# Patient Record
Sex: FEMALE | Race: WHITE | NOT HISPANIC OR LATINO | Employment: OTHER | URBAN - NONMETROPOLITAN AREA
[De-identification: names, ages, dates, MRNs, and addresses within clinical notes are randomized per-mention and may not be internally consistent; named-entity substitution may affect disease eponyms.]

---

## 2024-02-27 ENCOUNTER — LAB REQUISITION (OUTPATIENT)
Dept: LAB | Facility: HOSPITAL | Age: 84
End: 2024-02-27
Payer: MEDICARE

## 2024-02-27 DIAGNOSIS — R50.9 FEVER, UNSPECIFIED: ICD-10-CM

## 2024-02-27 LAB
ALBUMIN SERPL BCP-MCNC: 3.5 G/DL (ref 3.4–5)
ALP SERPL-CCNC: 84 U/L (ref 33–136)
ALT SERPL W P-5'-P-CCNC: 20 U/L (ref 7–45)
ANION GAP SERPL CALC-SCNC: 10 MMOL/L (ref 10–20)
AST SERPL W P-5'-P-CCNC: 38 U/L (ref 9–39)
BASOPHILS # BLD AUTO: 0.09 X10*3/UL (ref 0–0.1)
BASOPHILS NFR BLD AUTO: 0.3 %
BILIRUB SERPL-MCNC: 0.4 MG/DL (ref 0–1.2)
BUN SERPL-MCNC: 31 MG/DL (ref 6–23)
CALCIUM SERPL-MCNC: 8.9 MG/DL (ref 8.6–10.3)
CHLORIDE SERPL-SCNC: 100 MMOL/L (ref 98–107)
CO2 SERPL-SCNC: 36 MMOL/L (ref 21–32)
CREAT SERPL-MCNC: 1.12 MG/DL (ref 0.5–1.05)
EGFRCR SERPLBLD CKD-EPI 2021: 49 ML/MIN/1.73M*2
EOSINOPHIL # BLD AUTO: 0.01 X10*3/UL (ref 0–0.4)
EOSINOPHIL NFR BLD AUTO: 0 %
ERYTHROCYTE [DISTWIDTH] IN BLOOD BY AUTOMATED COUNT: 13.4 % (ref 11.5–14.5)
GLUCOSE SERPL-MCNC: 94 MG/DL (ref 74–99)
HCT VFR BLD AUTO: 35.8 % (ref 36–46)
HGB BLD-MCNC: 10.8 G/DL (ref 12–16)
IMM GRANULOCYTES # BLD AUTO: 0.36 X10*3/UL (ref 0–0.5)
IMM GRANULOCYTES NFR BLD AUTO: 1.1 % (ref 0–0.9)
LYMPHOCYTES # BLD AUTO: 1 X10*3/UL (ref 0.8–3)
LYMPHOCYTES NFR BLD AUTO: 3.1 %
MCH RBC QN AUTO: 28.6 PG (ref 26–34)
MCHC RBC AUTO-ENTMCNC: 30.2 G/DL (ref 32–36)
MCV RBC AUTO: 95 FL (ref 80–100)
MONOCYTES # BLD AUTO: 2.6 X10*3/UL (ref 0.05–0.8)
MONOCYTES NFR BLD AUTO: 8.1 %
NEUTROPHILS # BLD AUTO: 28.11 X10*3/UL (ref 1.6–5.5)
NEUTROPHILS NFR BLD AUTO: 87.4 %
NRBC BLD-RTO: 0 /100 WBCS (ref 0–0)
PLATELET # BLD AUTO: 229 X10*3/UL (ref 150–450)
POTASSIUM SERPL-SCNC: 3.4 MMOL/L (ref 3.5–5.3)
PROT SERPL-MCNC: 6.1 G/DL (ref 6.4–8.2)
RBC # BLD AUTO: 3.78 X10*6/UL (ref 4–5.2)
SODIUM SERPL-SCNC: 143 MMOL/L (ref 136–145)
WBC # BLD AUTO: 32.2 X10*3/UL (ref 4.4–11.3)

## 2024-02-27 PROCEDURE — 80053 COMPREHEN METABOLIC PANEL: CPT

## 2024-02-27 PROCEDURE — 85025 COMPLETE CBC W/AUTO DIFF WBC: CPT

## 2024-05-25 ENCOUNTER — APPOINTMENT (OUTPATIENT)
Dept: RADIOLOGY | Facility: HOSPITAL | Age: 84
DRG: 871 | End: 2024-05-25
Payer: MEDICARE

## 2024-05-25 ENCOUNTER — HOSPITAL ENCOUNTER (INPATIENT)
Facility: HOSPITAL | Age: 84
LOS: 10 days | Discharge: NURSING, REHAB OR LONG-TERM CARE | DRG: 871 | End: 2024-06-04
Attending: STUDENT IN AN ORGANIZED HEALTH CARE EDUCATION/TRAINING PROGRAM | Admitting: FAMILY MEDICINE
Payer: MEDICARE

## 2024-05-25 ENCOUNTER — APPOINTMENT (OUTPATIENT)
Dept: CARDIOLOGY | Facility: HOSPITAL | Age: 84
DRG: 871 | End: 2024-05-25
Payer: MEDICARE

## 2024-05-25 DIAGNOSIS — I63.9 ISCHEMIC STROKE (MULTI): ICD-10-CM

## 2024-05-25 DIAGNOSIS — R40.0 SOMNOLENCE: Primary | ICD-10-CM

## 2024-05-25 DIAGNOSIS — J96.02 ACUTE RESPIRATORY FAILURE WITH HYPOXIA AND HYPERCARBIA (MULTI): ICD-10-CM

## 2024-05-25 DIAGNOSIS — I63.89 AC ISCH MULTI VASC TERRITORIES STROKE (MULTI): ICD-10-CM

## 2024-05-25 DIAGNOSIS — R41.82 ALTERED MENTAL STATUS, UNSPECIFIED ALTERED MENTAL STATUS TYPE: ICD-10-CM

## 2024-05-25 DIAGNOSIS — K59.00 CONSTIPATION, UNSPECIFIED CONSTIPATION TYPE: ICD-10-CM

## 2024-05-25 DIAGNOSIS — N39.0 ACUTE UTI: ICD-10-CM

## 2024-05-25 DIAGNOSIS — J96.01 ACUTE RESPIRATORY FAILURE WITH HYPOXIA AND HYPERCARBIA (MULTI): ICD-10-CM

## 2024-05-25 LAB
ALBUMIN SERPL-MCNC: 3.7 G/DL (ref 3.5–5)
ALP BLD-CCNC: 97 U/L (ref 35–125)
ALT SERPL-CCNC: 9 U/L (ref 5–40)
ANION GAP BLDV CALCULATED.4IONS-SCNC: 1 MMOL/L (ref 10–25)
ANION GAP SERPL CALC-SCNC: 7 MMOL/L
APPEARANCE UR: ABNORMAL
AST SERPL-CCNC: 16 U/L (ref 5–40)
BACTERIA #/AREA URNS AUTO: ABNORMAL /HPF
BASE EXCESS BLDV CALC-SCNC: 11.6 MMOL/L (ref -2–3)
BASOPHILS # BLD AUTO: 0.05 X10*3/UL (ref 0–0.1)
BASOPHILS NFR BLD AUTO: 0.5 %
BILIRUB SERPL-MCNC: 0.3 MG/DL (ref 0.1–1.2)
BILIRUB UR STRIP.AUTO-MCNC: NEGATIVE MG/DL
BODY TEMPERATURE: 37 DEGREES CELSIUS
BUN SERPL-MCNC: 42 MG/DL (ref 8–25)
CA-I BLDV-SCNC: 1.17 MMOL/L (ref 1.1–1.33)
CALCIUM SERPL-MCNC: 9.2 MG/DL (ref 8.5–10.4)
CHLORIDE BLDV-SCNC: 102 MMOL/L (ref 98–107)
CHLORIDE SERPL-SCNC: 98 MMOL/L (ref 97–107)
CO2 SERPL-SCNC: 36 MMOL/L (ref 24–31)
COLOR UR: YELLOW
CREAT SERPL-MCNC: 1.9 MG/DL (ref 0.4–1.6)
EGFRCR SERPLBLD CKD-EPI 2021: 26 ML/MIN/1.73M*2
EOSINOPHIL # BLD AUTO: 0.17 X10*3/UL (ref 0–0.4)
EOSINOPHIL NFR BLD AUTO: 1.8 %
ERYTHROCYTE [DISTWIDTH] IN BLOOD BY AUTOMATED COUNT: 13.8 % (ref 11.5–14.5)
GLUCOSE BLDV-MCNC: 126 MG/DL (ref 74–99)
GLUCOSE SERPL-MCNC: 128 MG/DL (ref 65–99)
GLUCOSE UR STRIP.AUTO-MCNC: NORMAL MG/DL
GRAN CASTS #/AREA UR COMP ASSIST: ABNORMAL /LPF
HCO3 BLDV-SCNC: 40.8 MMOL/L (ref 22–26)
HCT VFR BLD AUTO: 36.9 % (ref 36–46)
HCT VFR BLD EST: 32 % (ref 36–46)
HGB BLD-MCNC: 10.2 G/DL (ref 12–16)
HGB BLDV-MCNC: 10.7 G/DL (ref 12–16)
HYALINE CASTS #/AREA URNS AUTO: ABNORMAL /LPF
HYPOCHROMIA BLD QL SMEAR: NORMAL
IMM GRANULOCYTES # BLD AUTO: 0.12 X10*3/UL (ref 0–0.5)
IMM GRANULOCYTES NFR BLD AUTO: 1.3 % (ref 0–0.9)
INHALED O2 CONCENTRATION: 32 %
KETONES UR STRIP.AUTO-MCNC: NEGATIVE MG/DL
LACTATE BLDV-SCNC: 0.7 MMOL/L (ref 0.4–2)
LEUKOCYTE ESTERASE UR QL STRIP.AUTO: ABNORMAL
LYMPHOCYTES # BLD AUTO: 1.49 X10*3/UL (ref 0.8–3)
LYMPHOCYTES NFR BLD AUTO: 15.8 %
MCH RBC QN AUTO: 26.6 PG (ref 26–34)
MCHC RBC AUTO-ENTMCNC: 27.6 G/DL (ref 32–36)
MCV RBC AUTO: 96 FL (ref 80–100)
MONOCYTES # BLD AUTO: 0.98 X10*3/UL (ref 0.05–0.8)
MONOCYTES NFR BLD AUTO: 10.4 %
MUCOUS THREADS #/AREA URNS AUTO: ABNORMAL /LPF
NEUTROPHILS # BLD AUTO: 6.61 X10*3/UL (ref 1.6–5.5)
NEUTROPHILS NFR BLD AUTO: 70.2 %
NITRITE UR QL STRIP.AUTO: NEGATIVE
NRBC BLD-RTO: 0 /100 WBCS (ref 0–0)
NT-PROBNP SERPL-MCNC: 7983 PG/ML (ref 0–624)
OXYHGB MFR BLDV: 74.3 % (ref 45–75)
PCO2 BLDV: 83 MM HG (ref 41–51)
PH BLDV: 7.3 PH (ref 7.33–7.43)
PH UR STRIP.AUTO: 5.5 [PH]
PLATELET # BLD AUTO: 290 X10*3/UL (ref 150–450)
PO2 BLDV: 47 MM HG (ref 35–45)
POTASSIUM BLDV-SCNC: 5.8 MMOL/L (ref 3.5–5.3)
POTASSIUM SERPL-SCNC: 5 MMOL/L (ref 3.4–5.1)
PROT SERPL-MCNC: 7.8 G/DL (ref 5.9–7.9)
PROT UR STRIP.AUTO-MCNC: ABNORMAL MG/DL
RBC # BLD AUTO: 3.84 X10*6/UL (ref 4–5.2)
RBC # UR STRIP.AUTO: NEGATIVE /UL
RBC #/AREA URNS AUTO: ABNORMAL /HPF
RBC MORPH BLD: NORMAL
SAO2 % BLDV: 75 % (ref 45–75)
SARS-COV-2 RNA RESP QL NAA+PROBE: NOT DETECTED
SODIUM BLDV-SCNC: 138 MMOL/L (ref 136–145)
SODIUM SERPL-SCNC: 141 MMOL/L (ref 133–145)
SP GR UR STRIP.AUTO: 1.02
SQUAMOUS #/AREA URNS AUTO: ABNORMAL /HPF
TRANS CELLS #/AREA UR COMP ASSIST: ABNORMAL /HPF
TROPONIN T SERPL-MCNC: 24 NG/L
UROBILINOGEN UR STRIP.AUTO-MCNC: NORMAL MG/DL
WBC # BLD AUTO: 9.4 X10*3/UL (ref 4.4–11.3)
WBC #/AREA URNS AUTO: >50 /HPF
WBC CLUMPS #/AREA URNS AUTO: ABNORMAL /HPF

## 2024-05-25 PROCEDURE — 2020000001 HC ICU ROOM DAILY

## 2024-05-25 PROCEDURE — 87086 URINE CULTURE/COLONY COUNT: CPT | Mod: TRILAB,WESLAB | Performed by: STUDENT IN AN ORGANIZED HEALTH CARE EDUCATION/TRAINING PROGRAM

## 2024-05-25 PROCEDURE — 36415 COLL VENOUS BLD VENIPUNCTURE: CPT | Performed by: STUDENT IN AN ORGANIZED HEALTH CARE EDUCATION/TRAINING PROGRAM

## 2024-05-25 PROCEDURE — 80053 COMPREHEN METABOLIC PANEL: CPT | Performed by: STUDENT IN AN ORGANIZED HEALTH CARE EDUCATION/TRAINING PROGRAM

## 2024-05-25 PROCEDURE — 83880 ASSAY OF NATRIURETIC PEPTIDE: CPT | Performed by: STUDENT IN AN ORGANIZED HEALTH CARE EDUCATION/TRAINING PROGRAM

## 2024-05-25 PROCEDURE — 2500000005 HC RX 250 GENERAL PHARMACY W/O HCPCS: Performed by: FAMILY MEDICINE

## 2024-05-25 PROCEDURE — 71045 X-RAY EXAM CHEST 1 VIEW: CPT

## 2024-05-25 PROCEDURE — 2500000004 HC RX 250 GENERAL PHARMACY W/ HCPCS (ALT 636 FOR OP/ED): Performed by: STUDENT IN AN ORGANIZED HEALTH CARE EDUCATION/TRAINING PROGRAM

## 2024-05-25 PROCEDURE — 84484 ASSAY OF TROPONIN QUANT: CPT | Performed by: STUDENT IN AN ORGANIZED HEALTH CARE EDUCATION/TRAINING PROGRAM

## 2024-05-25 PROCEDURE — 2500000001 HC RX 250 WO HCPCS SELF ADMINISTERED DRUGS (ALT 637 FOR MEDICARE OP): Performed by: FAMILY MEDICINE

## 2024-05-25 PROCEDURE — 84132 ASSAY OF SERUM POTASSIUM: CPT | Mod: 91 | Performed by: STUDENT IN AN ORGANIZED HEALTH CARE EDUCATION/TRAINING PROGRAM

## 2024-05-25 PROCEDURE — 94660 CPAP INITIATION&MGMT: CPT

## 2024-05-25 PROCEDURE — 93010 ELECTROCARDIOGRAM REPORT: CPT | Performed by: INTERNAL MEDICINE

## 2024-05-25 PROCEDURE — 85025 COMPLETE CBC W/AUTO DIFF WBC: CPT | Performed by: STUDENT IN AN ORGANIZED HEALTH CARE EDUCATION/TRAINING PROGRAM

## 2024-05-25 PROCEDURE — 93005 ELECTROCARDIOGRAM TRACING: CPT

## 2024-05-25 PROCEDURE — 70450 CT HEAD/BRAIN W/O DYE: CPT

## 2024-05-25 PROCEDURE — 5A09357 ASSISTANCE WITH RESPIRATORY VENTILATION, LESS THAN 24 CONSECUTIVE HOURS, CONTINUOUS POSITIVE AIRWAY PRESSURE: ICD-10-PCS | Performed by: FAMILY MEDICINE

## 2024-05-25 PROCEDURE — 2500000004 HC RX 250 GENERAL PHARMACY W/ HCPCS (ALT 636 FOR OP/ED): Performed by: FAMILY MEDICINE

## 2024-05-25 PROCEDURE — 81001 URINALYSIS AUTO W/SCOPE: CPT | Performed by: STUDENT IN AN ORGANIZED HEALTH CARE EDUCATION/TRAINING PROGRAM

## 2024-05-25 PROCEDURE — 70450 CT HEAD/BRAIN W/O DYE: CPT | Performed by: RADIOLOGY

## 2024-05-25 PROCEDURE — 99291 CRITICAL CARE FIRST HOUR: CPT | Performed by: STUDENT IN AN ORGANIZED HEALTH CARE EDUCATION/TRAINING PROGRAM

## 2024-05-25 PROCEDURE — 87635 SARS-COV-2 COVID-19 AMP PRB: CPT | Performed by: STUDENT IN AN ORGANIZED HEALTH CARE EDUCATION/TRAINING PROGRAM

## 2024-05-25 PROCEDURE — 93880 EXTRACRANIAL BILAT STUDY: CPT | Performed by: RADIOLOGY

## 2024-05-25 PROCEDURE — 96374 THER/PROPH/DIAG INJ IV PUSH: CPT

## 2024-05-25 PROCEDURE — 93880 EXTRACRANIAL BILAT STUDY: CPT

## 2024-05-25 PROCEDURE — 71045 X-RAY EXAM CHEST 1 VIEW: CPT | Performed by: RADIOLOGY

## 2024-05-25 RX ORDER — POLYETHYLENE GLYCOL 3350 17 G/17G
17 POWDER, FOR SOLUTION ORAL DAILY PRN
COMMUNITY

## 2024-05-25 RX ORDER — POLYMYXIN B SULFATE AND TRIMETHOPRIM 1; 10000 MG/ML; [USP'U]/ML
1 SOLUTION OPHTHALMIC EVERY 4 HOURS
COMMUNITY

## 2024-05-25 RX ORDER — NYSTATIN 100000 [USP'U]/G
1 POWDER TOPICAL 2 TIMES DAILY
Status: DISCONTINUED | OUTPATIENT
Start: 2024-05-25 | End: 2024-06-04 | Stop reason: HOSPADM

## 2024-05-25 RX ORDER — CARVEDILOL 6.25 MG/1
6.25 TABLET ORAL
Status: DISCONTINUED | OUTPATIENT
Start: 2024-05-25 | End: 2024-06-04 | Stop reason: HOSPADM

## 2024-05-25 RX ORDER — PRAVASTATIN SODIUM 40 MG/1
40 TABLET ORAL NIGHTLY
COMMUNITY
End: 2024-06-04 | Stop reason: HOSPADM

## 2024-05-25 RX ORDER — ERGOCALCIFEROL 1.25 MG/1
1.25 CAPSULE ORAL
COMMUNITY

## 2024-05-25 RX ORDER — FUROSEMIDE 10 MG/ML
80 INJECTION INTRAMUSCULAR; INTRAVENOUS ONCE
Status: COMPLETED | OUTPATIENT
Start: 2024-05-25 | End: 2024-05-25

## 2024-05-25 RX ORDER — ACETAMINOPHEN 325 MG/1
650 TABLET ORAL EVERY 8 HOURS PRN
Status: DISCONTINUED | OUTPATIENT
Start: 2024-05-25 | End: 2024-05-27

## 2024-05-25 RX ORDER — NAPROXEN SODIUM 220 MG/1
81 TABLET, FILM COATED ORAL DAILY
Status: DISCONTINUED | OUTPATIENT
Start: 2024-05-25 | End: 2024-05-28

## 2024-05-25 RX ORDER — AMLODIPINE BESYLATE 10 MG/1
10 TABLET ORAL DAILY
Status: DISCONTINUED | OUTPATIENT
Start: 2024-05-25 | End: 2024-06-04 | Stop reason: HOSPADM

## 2024-05-25 RX ORDER — IPRATROPIUM BROMIDE AND ALBUTEROL SULFATE 2.5; .5 MG/3ML; MG/3ML
3 SOLUTION RESPIRATORY (INHALATION) EVERY 4 HOURS PRN
COMMUNITY

## 2024-05-25 RX ORDER — CEFTRIAXONE 1 G/50ML
1 INJECTION, SOLUTION INTRAVENOUS ONCE
Status: COMPLETED | OUTPATIENT
Start: 2024-05-25 | End: 2024-05-25

## 2024-05-25 RX ORDER — ATORVASTATIN CALCIUM 40 MG/1
40 TABLET, FILM COATED ORAL NIGHTLY
Status: DISCONTINUED | OUTPATIENT
Start: 2024-05-25 | End: 2024-06-04 | Stop reason: HOSPADM

## 2024-05-25 RX ORDER — ACETAMINOPHEN, DIPHENHYDRAMINE HCL, PHENYLEPHRINE HCL 325; 25; 5 MG/1; MG/1; MG/1
5 TABLET ORAL NIGHTLY
COMMUNITY

## 2024-05-25 RX ORDER — LEVOTHYROXINE SODIUM 100 UG/1
100 TABLET ORAL DAILY
Status: DISCONTINUED | OUTPATIENT
Start: 2024-05-25 | End: 2024-06-04 | Stop reason: HOSPADM

## 2024-05-25 RX ORDER — ISOSORBIDE MONONITRATE 30 MG/1
30 TABLET, EXTENDED RELEASE ORAL DAILY
COMMUNITY

## 2024-05-25 RX ORDER — NYSTATIN 100000 [USP'U]/G
1 POWDER TOPICAL 2 TIMES DAILY
COMMUNITY

## 2024-05-25 RX ORDER — OMEPRAZOLE 20 MG/1
20 TABLET, DELAYED RELEASE ORAL
COMMUNITY

## 2024-05-25 RX ORDER — HYDRALAZINE HYDROCHLORIDE 25 MG/1
25 TABLET, FILM COATED ORAL 3 TIMES DAILY
COMMUNITY
End: 2024-06-04 | Stop reason: HOSPADM

## 2024-05-25 RX ORDER — IPRATROPIUM BROMIDE AND ALBUTEROL SULFATE 2.5; .5 MG/3ML; MG/3ML
3 SOLUTION RESPIRATORY (INHALATION) EVERY 4 HOURS PRN
Status: DISCONTINUED | OUTPATIENT
Start: 2024-05-25 | End: 2024-06-04 | Stop reason: HOSPADM

## 2024-05-25 RX ORDER — ISOSORBIDE MONONITRATE 30 MG/1
30 TABLET, EXTENDED RELEASE ORAL DAILY
Status: DISCONTINUED | OUTPATIENT
Start: 2024-05-25 | End: 2024-06-04 | Stop reason: HOSPADM

## 2024-05-25 RX ORDER — AMLODIPINE BESYLATE 10 MG/1
10 TABLET ORAL DAILY
COMMUNITY
End: 2024-06-04 | Stop reason: HOSPADM

## 2024-05-25 RX ORDER — HYDROXYZINE HYDROCHLORIDE 10 MG/1
10 TABLET, FILM COATED ORAL 3 TIMES DAILY
COMMUNITY

## 2024-05-25 RX ORDER — LEVOTHYROXINE SODIUM 100 UG/1
100 TABLET ORAL DAILY
COMMUNITY

## 2024-05-25 RX ORDER — DOCUSATE SODIUM 100 MG/1
100 CAPSULE, LIQUID FILLED ORAL DAILY
COMMUNITY

## 2024-05-25 RX ORDER — ONDANSETRON 4 MG/1
4 TABLET, FILM COATED ORAL EVERY 6 HOURS PRN
COMMUNITY

## 2024-05-25 RX ORDER — CARVEDILOL 6.25 MG/1
6.25 TABLET ORAL
COMMUNITY

## 2024-05-25 RX ORDER — POLYETHYLENE GLYCOL 3350 17 G/17G
17 POWDER, FOR SOLUTION ORAL DAILY PRN
Status: DISCONTINUED | OUTPATIENT
Start: 2024-05-25 | End: 2024-05-27

## 2024-05-25 RX ORDER — ACETAMINOPHEN 325 MG/1
650 TABLET ORAL EVERY 8 HOURS PRN
COMMUNITY

## 2024-05-25 RX ORDER — HYDRALAZINE HYDROCHLORIDE 25 MG/1
25 TABLET, FILM COATED ORAL 3 TIMES DAILY
Status: DISCONTINUED | OUTPATIENT
Start: 2024-05-25 | End: 2024-06-04 | Stop reason: HOSPADM

## 2024-05-25 RX ORDER — DOCUSATE SODIUM 100 MG/1
100 CAPSULE, LIQUID FILLED ORAL DAILY
Status: DISCONTINUED | OUTPATIENT
Start: 2024-05-25 | End: 2024-06-04 | Stop reason: HOSPADM

## 2024-05-25 RX ADMIN — CEFTRIAXONE SODIUM 1 G: 1 INJECTION, SOLUTION INTRAVENOUS at 18:26

## 2024-05-25 RX ADMIN — NYSTATIN 1 APPLICATION: 100000 POWDER TOPICAL at 22:37

## 2024-05-25 RX ADMIN — Medication 5 L/MIN: at 18:49

## 2024-05-25 RX ADMIN — FUROSEMIDE 80 MG: 10 INJECTION, SOLUTION INTRAMUSCULAR; INTRAVENOUS at 15:05

## 2024-05-25 RX ADMIN — Medication 60 PERCENT: at 16:24

## 2024-05-25 SDOH — ECONOMIC STABILITY: INCOME INSECURITY: IN THE LAST 12 MONTHS, WAS THERE A TIME WHEN YOU WERE NOT ABLE TO PAY THE MORTGAGE OR RENT ON TIME?: NO

## 2024-05-25 SDOH — ECONOMIC STABILITY: INCOME INSECURITY: HOW HARD IS IT FOR YOU TO PAY FOR THE VERY BASICS LIKE FOOD, HOUSING, MEDICAL CARE, AND HEATING?: NOT HARD AT ALL

## 2024-05-25 SDOH — ECONOMIC STABILITY: HOUSING INSECURITY
IN THE LAST 12 MONTHS, WAS THERE A TIME WHEN YOU DID NOT HAVE A STEADY PLACE TO SLEEP OR SLEPT IN A SHELTER (INCLUDING NOW)?: NO

## 2024-05-25 SDOH — HEALTH STABILITY: MENTAL HEALTH
HOW OFTEN DO YOU NEED TO HAVE SOMEONE HELP YOU WHEN YOU READ INSTRUCTIONS, PAMPHLETS, OR OTHER WRITTEN MATERIAL FROM YOUR DOCTOR OR PHARMACY?: SOMETIMES

## 2024-05-25 SDOH — ECONOMIC STABILITY: TRANSPORTATION INSECURITY
IN THE PAST 12 MONTHS, HAS THE LACK OF TRANSPORTATION KEPT YOU FROM MEDICAL APPOINTMENTS OR FROM GETTING MEDICATIONS?: NO

## 2024-05-25 SDOH — HEALTH STABILITY: MENTAL HEALTH: HOW OFTEN DO YOU HAVE A DRINK CONTAINING ALCOHOL?: NEVER

## 2024-05-25 SDOH — ECONOMIC STABILITY: TRANSPORTATION INSECURITY
IN THE PAST 12 MONTHS, HAS LACK OF TRANSPORTATION KEPT YOU FROM MEETINGS, WORK, OR FROM GETTING THINGS NEEDED FOR DAILY LIVING?: NO

## 2024-05-25 SDOH — ECONOMIC STABILITY: HOUSING INSECURITY: IN THE LAST 12 MONTHS, HOW MANY PLACES HAVE YOU LIVED?: 1

## 2024-05-25 SDOH — HEALTH STABILITY: PHYSICAL HEALTH: ON AVERAGE, HOW MANY DAYS PER WEEK DO YOU ENGAGE IN MODERATE TO STRENUOUS EXERCISE (LIKE A BRISK WALK)?: 0 DAYS

## 2024-05-25 SDOH — SOCIAL STABILITY: SOCIAL INSECURITY: WERE YOU ABLE TO COMPLETE ALL THE BEHAVIORAL HEALTH SCREENINGS?: NO

## 2024-05-25 SDOH — HEALTH STABILITY: MENTAL HEALTH: HOW OFTEN DO YOU HAVE 6 OR MORE DRINKS ON ONE OCCASION?: NEVER

## 2024-05-25 SDOH — ECONOMIC STABILITY: INCOME INSECURITY
HOW HARD IS IT FOR YOU TO PAY FOR THE VERY BASICS LIKE FOOD, HOUSING, MEDICAL CARE, AND HEATING?: PATIENT UNABLE TO ANSWER

## 2024-05-25 SDOH — HEALTH STABILITY: MENTAL HEALTH: HOW MANY STANDARD DRINKS CONTAINING ALCOHOL DO YOU HAVE ON A TYPICAL DAY?: PATIENT DOES NOT DRINK

## 2024-05-25 SDOH — HEALTH STABILITY: PHYSICAL HEALTH: ON AVERAGE, HOW MANY MINUTES DO YOU ENGAGE IN EXERCISE AT THIS LEVEL?: 0 MIN

## 2024-05-25 ASSESSMENT — ACTIVITIES OF DAILY LIVING (ADL)
ADEQUATE_TO_COMPLETE_ADL: YES
LACK_OF_TRANSPORTATION: NO
PATIENT'S MEMORY ADEQUATE TO SAFELY COMPLETE DAILY ACTIVITIES?: NO
BATHING: DEPENDENT
TOILETING: DEPENDENT
DRESSING YOURSELF: DEPENDENT
JUDGMENT_ADEQUATE_SAFELY_COMPLETE_DAILY_ACTIVITIES: NO
FEEDING YOURSELF: INDEPENDENT
HEARING - RIGHT EAR: FUNCTIONAL
HEARING - LEFT EAR: DEAF
GROOMING: NEEDS ASSISTANCE
WALKS IN HOME: DEPENDENT

## 2024-05-25 ASSESSMENT — COGNITIVE AND FUNCTIONAL STATUS - GENERAL: PATIENT BASELINE BEDBOUND: YES

## 2024-05-25 ASSESSMENT — PAIN DESCRIPTION - LOCATION: LOCATION: KNEE

## 2024-05-25 ASSESSMENT — PAIN SCALES - GENERAL
PAINLEVEL_OUTOF10: 0 - NO PAIN
PAINLEVEL_OUTOF10: 5 - MODERATE PAIN

## 2024-05-25 ASSESSMENT — PAIN DESCRIPTION - PAIN TYPE: TYPE: CHRONIC PAIN

## 2024-05-25 ASSESSMENT — PAIN DESCRIPTION - ORIENTATION: ORIENTATION: LEFT

## 2024-05-25 ASSESSMENT — COLUMBIA-SUICIDE SEVERITY RATING SCALE - C-SSRS
1. IN THE PAST MONTH, HAVE YOU WISHED YOU WERE DEAD OR WISHED YOU COULD GO TO SLEEP AND NOT WAKE UP?: NO
2. HAVE YOU ACTUALLY HAD ANY THOUGHTS OF KILLING YOURSELF?: NO
6. HAVE YOU EVER DONE ANYTHING, STARTED TO DO ANYTHING, OR PREPARED TO DO ANYTHING TO END YOUR LIFE?: NO

## 2024-05-25 ASSESSMENT — PAIN - FUNCTIONAL ASSESSMENT
PAIN_FUNCTIONAL_ASSESSMENT: WONG-BAKER FACES
PAIN_FUNCTIONAL_ASSESSMENT: 0-10

## 2024-05-25 ASSESSMENT — LIFESTYLE VARIABLES
AUDIT-C TOTAL SCORE: 0
SKIP TO QUESTIONS 9-10: 1

## 2024-05-25 ASSESSMENT — PAIN SCALES - WONG BAKER: WONGBAKER_NUMERICALRESPONSE: NO HURT

## 2024-05-25 NOTE — H&P
History Of Present Illness  This is an 85 yo female with history of COPD, HTN, hyperlipidemia, and hypothyroidism who presents to the ED for altered mental status. The patient was noted by her family to be lethargic yestrday, more so today. She is now arousable but lethargic. History is limited to her confusion but she appears to be a little tachypneic. ABG showed CO2 retention. She is DNRCCA with no intubation per family. She was placed on Bipap. She has an abnormal Urinalysis suggestive of UTI. CT of the head showed a small subacute stroke.      Past Medical History  COPD, HTN, HLD, Hypothyroidism      Social History  Unable to obtain due to altered mental status    Family History  Noncontributory due to advanced age      Allergies  Patient has no allergy information on record.    Review of Systems   Unable to obtain due to altered mental status    Physical Exam  Lethargic but arousable   Lungs diminished breath sounds bilaterally   Heart RRR  Abd soft NT   Ext no edema     Last Recorded Vitals  /83   Pulse (!) 49   Temp 36.3 °C (97.3 °F) (Temporal)   Resp 16   Wt 100 kg (220 lb 7.4 oz)   SpO2 (!) 92%     Relevant Results  Results for orders placed or performed during the hospital encounter of 05/25/24 (from the past 24 hour(s))   CBC with Differential   Result Value Ref Range    WBC 9.4 4.4 - 11.3 x10*3/uL    nRBC 0.0 0.0 - 0.0 /100 WBCs    RBC 3.84 (L) 4.00 - 5.20 x10*6/uL    Hemoglobin 10.2 (L) 12.0 - 16.0 g/dL    Hematocrit 36.9 36.0 - 46.0 %    MCV 96 80 - 100 fL    MCH 26.6 26.0 - 34.0 pg    MCHC 27.6 (L) 32.0 - 36.0 g/dL    RDW 13.8 11.5 - 14.5 %    Platelets 290 150 - 450 x10*3/uL    Neutrophils % 70.2 40.0 - 80.0 %    Immature Granulocytes %, Automated 1.3 (H) 0.0 - 0.9 %    Lymphocytes % 15.8 13.0 - 44.0 %    Monocytes % 10.4 2.0 - 10.0 %    Eosinophils % 1.8 0.0 - 6.0 %    Basophils % 0.5 0.0 - 2.0 %    Neutrophils Absolute 6.61 (H) 1.60 - 5.50 x10*3/uL    Immature Granulocytes Absolute,  Automated 0.12 0.00 - 0.50 x10*3/uL    Lymphocytes Absolute 1.49 0.80 - 3.00 x10*3/uL    Monocytes Absolute 0.98 (H) 0.05 - 0.80 x10*3/uL    Eosinophils Absolute 0.17 0.00 - 0.40 x10*3/uL    Basophils Absolute 0.05 0.00 - 0.10 x10*3/uL   Comprehensive Metabolic Panel   Result Value Ref Range    Glucose 128 (H) 65 - 99 mg/dL    Sodium 141 133 - 145 mmol/L    Potassium 5.0 3.4 - 5.1 mmol/L    Chloride 98 97 - 107 mmol/L    Bicarbonate 36 (H) 24 - 31 mmol/L    Urea Nitrogen 42 (H) 8 - 25 mg/dL    Creatinine 1.90 (H) 0.40 - 1.60 mg/dL    eGFR 26 (L) >60 mL/min/1.73m*2    Calcium 9.2 8.5 - 10.4 mg/dL    Albumin 3.7 3.5 - 5.0 g/dL    Alkaline Phosphatase 97 35 - 125 U/L    Total Protein 7.8 5.9 - 7.9 g/dL    AST 16 5 - 40 U/L    Bilirubin, Total 0.3 0.1 - 1.2 mg/dL    ALT 9 5 - 40 U/L    Anion Gap 7 <=19 mmol/L   NT Pro-BNP   Result Value Ref Range    PROBNP 7,983 (H) 0 - 624 pg/mL   Serial Troponin, Initial ([facility])   Result Value Ref Range    Troponin T, High Sensitivity 24 (HH) <=14 ng/L   Morphology   Result Value Ref Range    RBC Morphology See Below     Hypochromia Mild    Urinalysis with Reflex Culture and Microscopic   Result Value Ref Range    Color, Urine Yellow Light-Yellow, Yellow, Dark-Yellow    Appearance, Urine Turbid (N) Clear    Specific Gravity, Urine 1.017 1.005 - 1.035    pH, Urine 5.5 5.0, 5.5, 6.0, 6.5, 7.0, 7.5, 8.0    Protein, Urine 70 (1+) (A) NEGATIVE, 10 (TRACE), 20 (TRACE) mg/dL    Glucose, Urine Normal Normal mg/dL    Blood, Urine NEGATIVE NEGATIVE    Ketones, Urine NEGATIVE NEGATIVE mg/dL    Bilirubin, Urine NEGATIVE NEGATIVE    Urobilinogen, Urine Normal Normal mg/dL    Nitrite, Urine NEGATIVE NEGATIVE    Leukocyte Esterase, Urine 500 Barrie/µL (A) NEGATIVE   Microscopic Only, Urine   Result Value Ref Range    WBC, Urine >50 (A) 1-5, NONE /HPF    WBC Clumps, Urine MANY Reference range not established. /HPF    RBC, Urine 11-20 (A) NONE, 1-2, 3-5 /HPF    Squamous Epithelial Cells, Urine 10-25 (FEW)  Reference range not established. /HPF    Transitional Epithelial Cells, Urine 1-2 (FEW) Reference range not established. /HPF    Bacteria, Urine 3+ (A) NONE SEEN /HPF    Mucus, Urine FEW Reference range not established. /LPF    Hyaline Casts, Urine 3+ (A) NONE /LPF    Fine Granular Casts, Urine 3+ (A) NONE /LPF   Sars-CoV-2 PCR   Result Value Ref Range    Coronavirus 2019, PCR Not Detected Not Detected   BLOOD GAS VENOUS FULL PANEL   Result Value Ref Range    POCT pH, Venous 7.30 (L) 7.33 - 7.43 pH    POCT pCO2, Venous 83 (HH) 41 - 51 mm Hg    POCT pO2, Venous 47 (H) 35 - 45 mm Hg    POCT SO2, Venous 75 45 - 75 %    POCT Oxy Hemoglobin, Venous 74.3 45.0 - 75.0 %    POCT Hematocrit Calculated, Venous 32.0 (L) 36.0 - 46.0 %    POCT Sodium, Venous 138 136 - 145 mmol/L    POCT Potassium, Venous 5.8 (H) 3.5 - 5.3 mmol/L    POCT Chloride, Venous 102 98 - 107 mmol/L    POCT Ionized Calicum, Venous 1.17 1.10 - 1.33 mmol/L    POCT Glucose, Venous 126 (H) 74 - 99 mg/dL    POCT Lactate, Venous 0.7 0.4 - 2.0 mmol/L    POCT Base Excess, Venous 11.6 (H) -2.0 - 3.0 mmol/L    POCT HCO3 Calculated, Venous 40.8 (H) 22.0 - 26.0 mmol/L    POCT Hemoglobin, Venous 10.7 (L) 12.0 - 16.0 g/dL    POCT Anion Gap, Venous 1.0 (L) 10.0 - 25.0 mmol/L    Patient Temperature 37.0 degrees Celsius    FiO2 32 %     CT head wo IV contrast    Result Date: 5/25/2024  Interpreted By:  Sreekanth Nguyen, STUDY: CT HEAD WO IV CONTRAST;  5/25/2024 2:09 pm   INDICATION: Signs/Symptoms:altered mental status.   COMPARISON: Prior exam from 11/18/2022..   ACCESSION NUMBER(S): [id number]   ORDERING CLINICIAN: JHONATAN PADILLA   TECHNIQUE: Routine axial images were obtained from the skull base through the vertex.  Sagittal and coronal reconstruction images were generated. Brain, subdural, and bone windows were reviewed. N/A Unavailable   FINDINGS: INTRACRANIAL: Mild prominence of ventricles and sulci. There is moderate patchy hypodensity throughout the deep  periventricular white matter. No acute intracranial bleed, midline shift, or focal mass effect. There is however a subtle small area of hypodensity involving cortex and white matter in the lateral posterior right frontal lobe on axial images 40 through 43/78. This was not present previously. No destructive bone lesion. No depressed skull fracture. Skullbase arterial calcifications in the carotid siphons and vertebral arteries.   EXTRACRANIAL: There is mild inflammatory material posteriorly in the right sphenoid sinus. Otherwise, the visualized paranasal sinuses were clear. Visualized mastoid air cells were clear.       There is a subtle area of hypodensity peripherally in the posterolateral right frontal lobe as described. This could be acute or subacute nonhemorrhagic infarct. This was not present previously. Suggest further evaluation with MR I.   No acute intracranial bleed. No midline shift.   Mild volume loss.   Moderate chronic white matter ischemic disease in the deep periventricular regions.   Mild acute right sphenoid sinusitis.   MACRO: Sreekanth Nguyen discussed the significance and urgency of this critical finding by epic secure chat with  JHONATAN PADILLA on 5/25/2024 at 2:27 pm.  (**-RCF-**) Findings:  See findings.   Signed by: Sreekanth Nguyen 5/25/2024 2:28 PM Dictation workstation:   [id number]    XR chest 1 view    Result Date: 5/25/2024  Interpreted By:  Jose Miguel Smith, STUDY: XR CHEST 1 VIEW;  5/25/2024 1:14 pm   INDICATION: Signs/Symptoms:confusion.   COMPARISON: 03/09/2023   ACCESSION NUMBER(S): [id number]   ORDERING CLINICIAN: JHONATAN PADILLA   FINDINGS: Scattered bilateral airspace opacities. Pleural effusions not excluded. Cardiomegaly suspected. Aortic atherosclerosis.       Scattered bilateral infiltrates or edema.   MACRO: None   Signed by: Jose Miguel Smith 5/25/2024 1:17 PM Dictation workstation:   [id number]        Assessment/Plan   Metabolic encephalopathy  Acute hypercapnic respiratory  failure   CHF/COPD exacerbation   Urinary tract infection   Subacute ischemic stroke   Renal insufficiency, acute vs chronic   Hypertension, Dyslipidemia, Hypothyroidism  DNR CCA DNI    Plan:  Bipap trial, repeat ABG, consult pulmonary   IV lasix, obtain TTE, check serial troponin   Repeat BMP tomorrow   Iv ceftriaxone, await urine and blood Cx  US carotids, MRI brain, aspirin and statin, consult neurology           Srinivas Andino MD

## 2024-05-25 NOTE — ED PROVIDER NOTES
HPI   Chief Complaint   Patient presents with    Altered Mental Status     Nurse states she is more confused today, doesn't know when last known well is. States patient is usually alert and oriented x 3 and is now x 1-2       This is an 84-year-old female sent to the emergency department today by staff nurse facility.  Her nurse noted that she was more lethargic and confused today, she typically is alert and oriented x 3 now she is oriented only to self.  She was tired and lethargic yesterday per family, they visited her and she was not speaking that much, was only sleeping.  The nurse today who reports that they are not sure what time her last known well truly was.  The patient has chronic pain in her legs and her knees which is unchanged from her baseline, she otherwise denies any acute complaints on arrival.      History provided by:  Patient, medical records and relative  History limited by:  Mental status change   used: No                        Josef Coma Scale Score: 14                     Patient History   History reviewed. No pertinent past medical history.  History reviewed. No pertinent surgical history.  No family history on file.  Social History     Tobacco Use    Smoking status: Former     Types: Cigarettes     Passive exposure: Past    Smokeless tobacco: Never   Vaping Use    Vaping status: Former   Substance Use Topics    Alcohol use: Never    Drug use: Never       Physical Exam   ED Triage Vitals   Temp Heart Rate Resp BP   05/25/24 1238 05/25/24 1238 05/25/24 1238 05/25/24 1238   36.3 °C (97.3 °F) 62 (!) 22 (!) 134/108      SpO2 Temp Source Heart Rate Source Patient Position   05/25/24 1238 05/25/24 1238 05/25/24 1238 05/25/24 1624   (!) 89 % Temporal Monitor Lying      BP Location FiO2 (%)     05/25/24 1238 05/25/24 1624     Left arm 60 %       Physical Exam  General: well developed, obese elderly female who is oriented to self and place, somnolent.  Eyes: sclera clear  bilaterally, PERRL, EOMI,   HENT: normocephalic, atraumatic. Pharynx without erythema or exudates, uvula midline.  CV: regular rate irregular rhythm, no murmur, no gallops, or rubs.   Resp: clear to ascultation bilaterally, no wheezes, rales, or rhonchi  GI: abdomen soft, nontender without rigidity or guarding, no peritoneal signs, abdomen is nondistended, no masses palpated  MSK: strength +5/5 to upper extremities bilaterally, dorsiflexion and plantarflexion intact lower extremities, no swelling of the extremities.  Patient not cooperative with hip flexion due to pain at the knees with effort against gravity.  Neuro: no focal deficits, CN2-12 intact. Sensation fully intact.  Patient is disoriented, NIHSS is 1.  Psych: Confused, lethargic but arousable, cooperative with exam  Skin: warm, dry, without evidence of rash or abrasions    ED Course & MDM   ED Course as of 05/25/24 1749   Sat May 25, 2024   1429 CT head wo IV contrast  There is a subtle area of hypodensity peripherally in the  posterolateral right frontal lobe as described. This could be acute  or subacute nonhemorrhagic infarct. This was not present previously.  Suggest further evaluation with MR I.   [NT]      ED Course User Index  [NT] Juanjo López DO         Diagnoses as of 05/25/24 1749   Somnolence   Altered mental status, unspecified altered mental status type   Acute UTI   Ischemic stroke (Multi)   Acute respiratory failure with hypoxia and hypercarbia (Multi)       Medical Decision Making  On arrival to ED the patient is hypoxemic, and somnolent.  She was placed on supplemental oxygen by nasal cannula.  She was able to wake up and answer my questions appropriately although she did require frequent stimulation to get her to wake up long enough to speak with me.  She did receive a dose of Rocephin at the facility this morning for suspected UTI.  She had no apparent focal neurologic deficits on exam, although she did not want to lift either  of her legs.  Family states that this is her baseline, she has chronic pain in her legs and has not not been able to walk for months.  NIHSS on my initial assessment was 1 for disorientation.    During her stay in the ED she was found to have a UTI and Rocephin was ordered.  She was found have acute kidney injury with elevated creatinine.  She also appears to be in A-fib with slow ventricular response, she has no reported history of A-fib.  Troponin is indeterminate and BNP is elevated, she does have pulm edema on chest x-ray concerning for acute CHF as a cause of her respiratory failure.  Blood gas did show evidence that she is retaining CO2 with pCO2 of 83, pH is 7.3.  She was placed on BiPAP to treat this as this could be causing some of her somnolence.    CT imaging does show evidence of an acute to subacute infarct in the posterior lateral right frontal lobe.    I spoke with family at bedside, confirmed her CODE STATUS is DNR CCA, DNI.  Patient to become more somnolent during treatment in the ED to the point where she was guarding and grimacing but not opening her eyes or answer any questions.  We discussed that typically I would intubate at this point for airway protection, however based on her CODE STATUS she would not want this.  We also discussed stroke management and the fact that now that stroke stroke on CT it likely occurred hours ago and she is not a candidate for TNK or likely for thrombectomy given the small lesion seen on CT and the time from last known well, and given her CODE STATUS and not wanting any aggressive intervention performed.    Patient was admitted to the ICU here due to her worsening mental status for further medical management.    EKG on my interpretation shows A-fib with slow ventricular spots, rate is 53 beats minute.  Rightward axis.  QTc of 420.  No ST elevation or depression, no acute ischemic pattern.  No STEMI.    CT head wo IV contrast   Final Result   There is a subtle area  of hypodensity peripherally in the   posterolateral right frontal lobe as described. This could be acute   or subacute nonhemorrhagic infarct. This was not present previously.   Suggest further evaluation with MR I.        No acute intracranial bleed. No midline shift.        Mild volume loss.        Moderate chronic white matter ischemic disease in the deep   periventricular regions.        Mild acute right sphenoid sinusitis.        MACRO:   Sreekanth Nguyen discussed the significance and urgency of this critical   finding by epic secure chat with  JHONATAN PADILLA on 5/25/2024 at   2:27 pm.  (**-RCF-**) Findings:  See findings.        Signed by: Sreekanth Nguyen 5/25/2024 2:28 PM   Dictation workstation:   [id number]      XR chest 1 view   Final Result   Scattered bilateral infiltrates or edema.        MACRO:   None        Signed by: Jose Miguel Smith 5/25/2024 1:17 PM   Dictation workstation:   [id number]      Transthoracic Echo (TTE) Complete    (Results Pending)   MR brain wo IV contrast    (Results Pending)   Carotid duplex bilateral    (Results Pending)     Labs Reviewed   CBC WITH AUTO DIFFERENTIAL - Abnormal       Result Value    WBC 9.4      nRBC 0.0      RBC 3.84 (*)     Hemoglobin 10.2 (*)     Hematocrit 36.9      MCV 96      MCH 26.6      MCHC 27.6 (*)     RDW 13.8      Platelets 290      Neutrophils % 70.2      Immature Granulocytes %, Automated 1.3 (*)     Lymphocytes % 15.8      Monocytes % 10.4      Eosinophils % 1.8      Basophils % 0.5      Neutrophils Absolute 6.61 (*)     Immature Granulocytes Absolute, Automated 0.12      Lymphocytes Absolute 1.49      Monocytes Absolute 0.98 (*)     Eosinophils Absolute 0.17      Basophils Absolute 0.05     COMPREHENSIVE METABOLIC PANEL - Abnormal    Glucose 128 (*)     Sodium 141      Potassium 5.0      Chloride 98      Bicarbonate 36 (*)     Urea Nitrogen 42 (*)     Creatinine 1.90 (*)     eGFR 26 (*)     Calcium 9.2      Albumin 3.7      Alkaline Phosphatase 97       Total Protein 7.8      AST 16      Bilirubin, Total 0.3      ALT 9      Anion Gap 7     URINALYSIS WITH REFLEX CULTURE AND MICROSCOPIC - Abnormal    Color, Urine Yellow      Appearance, Urine Turbid (*)     Specific Gravity, Urine 1.017      pH, Urine 5.5      Protein, Urine 70 (1+) (*)     Glucose, Urine Normal      Blood, Urine NEGATIVE      Ketones, Urine NEGATIVE      Bilirubin, Urine NEGATIVE      Urobilinogen, Urine Normal      Nitrite, Urine NEGATIVE      Leukocyte Esterase, Urine 500 Barrie/µL (*)    N-TERMINAL PROBNP - Abnormal    PROBNP 7,983 (*)     Narrative:     Reference ranges are based on clinical submission data. These ranges represent the 95th percentile of normal cut-off points. As NT Pro- BNP values approach 1000 pg/ml, clinical symptoms are more likely associated with CHF.   BLOOD GAS VENOUS FULL PANEL - Abnormal    POCT pH, Venous 7.30 (*)     POCT pCO2, Venous 83 (*)     POCT pO2, Venous 47 (*)     POCT SO2, Venous 75      POCT Oxy Hemoglobin, Venous 74.3      POCT Hematocrit Calculated, Venous 32.0 (*)     POCT Sodium, Venous 138      POCT Potassium, Venous 5.8 (*)     POCT Chloride, Venous 102      POCT Ionized Calicum, Venous 1.17      POCT Glucose, Venous 126 (*)     POCT Lactate, Venous 0.7      POCT Base Excess, Venous 11.6 (*)     POCT HCO3 Calculated, Venous 40.8 (*)     POCT Hemoglobin, Venous 10.7 (*)     POCT Anion Gap, Venous 1.0 (*)     Patient Temperature 37.0      FiO2 32     SERIAL TROPONIN, INITIAL ([facility]) - Abnormal    Troponin T, High Sensitivity 24 (*)    MICROSCOPIC ONLY, URINE - Abnormal    WBC, Urine >50 (*)     WBC Clumps, Urine MANY      RBC, Urine 11-20 (*)     Squamous Epithelial Cells, Urine 10-25 (FEW)      Transitional Epithelial Cells, Urine 1-2 (FEW)      Bacteria, Urine 3+ (*)     Mucus, Urine FEW      Hyaline Casts, Urine 3+ (*)     Fine Granular Casts, Urine 3+ (*)    SARS-COV-2 PCR - Normal    Coronavirus 2019, PCR Not Detected      Narrative:     This assay  has received FDA Emergency Use Authorization (EUA) and is only authorized for the duration of time that circumstances exist to justify the authorization of the emergency use of in vitro diagnostic tests for the detection of SARS-CoV-2 virus and/or diagnosis of COVID-19 infection under section 564(b)(1) of the Act, 21 U.S.C. 360bbb-3(b)(1). This assay is an in vitro diagnostic nucleic acid amplification test for the qualitative detection of SARS-CoV-2 from nasopharyngeal specimens and has been validated for use at [facility]. Negative results do not preclude COVID-19 infections and should not be used as the sole basis for diagnosis, treatment, or other management decisions.     URINE CULTURE   URINALYSIS WITH REFLEX CULTURE AND MICROSCOPIC    Narrative:     The following orders were created for panel order Urinalysis with Reflex Culture and Microscopic.  Procedure                               Abnormality         Status                     ---------                               -----------         ------                     Urinalysis with Reflex C...[[id number]]  Abnormal            Final result               Extra Urine Gray Tube[[id number]]                                                         Please view results for these tests on the individual orders.   TROPONIN T SERIES, HIGH SENSITIVITY (0, 2 HR, 6 HR)    Narrative:     The following orders were created for panel order Troponin T Series, High Sensitivity (0, 2HR, 6HR).  Procedure                               Abnormality         Status                     ---------                               -----------         ------                     Serial Troponin, Initial...[[id number]]  Abnormal            Final result                 Please view results for these tests on the individual orders.   MORPHOLOGY    RBC Morphology See Below      Hypochromia Mild           Procedure  Critical Care    Performed by: Juanjo López,    Authorized by: Juanjo López DO    Critical care provider statement:     Critical care time (minutes):  45    Critical care time was exclusive of:  Separately billable procedures and treating other patients    Critical care was necessary to treat or prevent imminent or life-threatening deterioration of the following conditions:  Respiratory failure and CNS failure or compromise    Critical care was time spent personally by me on the following activities:  Ordering and review of laboratory studies, ordering and review of radiographic studies, examination of patient, review of old charts, evaluation of patient's response to treatment, re-evaluation of patient's condition, pulse oximetry, development of treatment plan with patient or surrogate and obtaining history from patient or surrogate    Care discussed with: admitting provider         Juanjo López DO  05/25/24 1804       Juanjo López DO  05/25/24 1809

## 2024-05-26 LAB
ANION GAP SERPL CALC-SCNC: 11 MMOL/L
ARTERIAL PATENCY WRIST A: POSITIVE
BASE EXCESS BLDA CALC-SCNC: 13.1 MMOL/L (ref -2–3)
BODY TEMPERATURE: 37 DEGREES CELSIUS
BUN SERPL-MCNC: 43 MG/DL (ref 8–25)
CALCIUM SERPL-MCNC: 8.8 MG/DL (ref 8.5–10.4)
CHLORIDE SERPL-SCNC: 100 MMOL/L (ref 97–107)
CO2 SERPL-SCNC: 33 MMOL/L (ref 24–31)
CREAT SERPL-MCNC: 1.8 MG/DL (ref 0.4–1.6)
EGFRCR SERPLBLD CKD-EPI 2021: 27 ML/MIN/1.73M*2
EPAP CMH2O: 6 CM H2O
FREQUENCY (BPM): 16 BPM
GLUCOSE BLD MANUAL STRIP-MCNC: 84 MG/DL (ref 74–99)
GLUCOSE BLD MANUAL STRIP-MCNC: 89 MG/DL (ref 74–99)
GLUCOSE SERPL-MCNC: 101 MG/DL (ref 65–99)
HCO3 BLDA-SCNC: 41.2 MMOL/L (ref 22–26)
INHALED O2 CONCENTRATION: 60 %
IPAP CMH2O: 18 CM H2O
OXYHGB MFR BLDA: 95.4 % (ref 94–98)
PCO2 BLDA: 68 MM HG (ref 38–42)
PH BLDA: 7.39 PH (ref 7.38–7.42)
PO2 BLDA: 79 MM HG (ref 85–95)
POTASSIUM SERPL-SCNC: 4.9 MMOL/L (ref 3.4–5.1)
SAO2 % BLDA: 98 % (ref 94–100)
SODIUM SERPL-SCNC: 144 MMOL/L (ref 133–145)
SPECIMEN DRAWN FROM PATIENT: ABNORMAL
TIDAL VOLUME: 378 ML
TROPONIN T SERPL-MCNC: 25 NG/L

## 2024-05-26 PROCEDURE — 2500000001 HC RX 250 WO HCPCS SELF ADMINISTERED DRUGS (ALT 637 FOR MEDICARE OP): Performed by: PSYCHIATRY & NEUROLOGY

## 2024-05-26 PROCEDURE — 82805 BLOOD GASES W/O2 SATURATION: CPT | Performed by: FAMILY MEDICINE

## 2024-05-26 PROCEDURE — 36600 WITHDRAWAL OF ARTERIAL BLOOD: CPT

## 2024-05-26 PROCEDURE — 82947 ASSAY GLUCOSE BLOOD QUANT: CPT

## 2024-05-26 PROCEDURE — 84484 ASSAY OF TROPONIN QUANT: CPT | Performed by: FAMILY MEDICINE

## 2024-05-26 PROCEDURE — 80048 BASIC METABOLIC PNL TOTAL CA: CPT | Performed by: FAMILY MEDICINE

## 2024-05-26 PROCEDURE — 36415 COLL VENOUS BLD VENIPUNCTURE: CPT | Performed by: FAMILY MEDICINE

## 2024-05-26 PROCEDURE — 9420000001 HC RT PATIENT EDUCATION 5 MIN

## 2024-05-26 PROCEDURE — 2020000001 HC ICU ROOM DAILY

## 2024-05-26 PROCEDURE — 94660 CPAP INITIATION&MGMT: CPT

## 2024-05-26 RX ORDER — ASPIRIN 300 MG/1
300 SUPPOSITORY RECTAL ONCE
Status: COMPLETED | OUTPATIENT
Start: 2024-05-26 | End: 2024-05-26

## 2024-05-26 RX ORDER — HYDRALAZINE HYDROCHLORIDE 20 MG/ML
10 INJECTION INTRAMUSCULAR; INTRAVENOUS EVERY 6 HOURS PRN
Status: DISCONTINUED | OUTPATIENT
Start: 2024-05-26 | End: 2024-06-04 | Stop reason: HOSPADM

## 2024-05-26 RX ADMIN — NYSTATIN 1 APPLICATION: 100000 POWDER TOPICAL at 08:55

## 2024-05-26 RX ADMIN — ASPIRIN 300 MG: 300 SUPPOSITORY RECTAL at 18:30

## 2024-05-26 RX ADMIN — NYSTATIN 1 APPLICATION: 100000 POWDER TOPICAL at 21:00

## 2024-05-26 ASSESSMENT — PAIN SCALES - GENERAL
PAINLEVEL_OUTOF10: 0 - NO PAIN

## 2024-05-26 ASSESSMENT — VISUAL ACUITY: VA_NORMAL: 1

## 2024-05-26 ASSESSMENT — ENCOUNTER SYMPTOMS
DIZZINESS: 0
WEAKNESS: 1

## 2024-05-26 ASSESSMENT — PAIN - FUNCTIONAL ASSESSMENT
PAIN_FUNCTIONAL_ASSESSMENT: 0-10
PAIN_FUNCTIONAL_ASSESSMENT: WONG-BAKER FACES

## 2024-05-26 NOTE — CARE PLAN
The patient's goals for the shift include     The clinical goals for the shift include Maintain oxygenation    Over the shift, the patient did not make progress toward the following goals. Barriers to progression include . Recommendations to address these barriers include   Problem: Pain - Adult  Goal: Verbalizes/displays adequate comfort level or baseline comfort level  Outcome: Progressing     Problem: Discharge Planning  Goal: Discharge to home or other facility with appropriate resources  Outcome: Progressing     Problem: Chronic Conditions and Co-morbidities  Goal: Patient's chronic conditions and co-morbidity symptoms are monitored and maintained or improved  Outcome: Progressing     Problem: Skin  Goal: Promote/optimize nutrition  Outcome: Progressing     Problem: General Stroke  Goal: Establish a mutual long term goal with patient by discharge  Outcome: Progressing  Goal: Demonstrate improvement in neurological exam throughout the shift  Outcome: Progressing  Goal: Participate in treatment (ie., meds, therapy) throughout shift  Outcome: Progressing  Goal: Out of bed three times today  Outcome: Progressing   .

## 2024-05-26 NOTE — CONSULTS
[facility]  Inpatient Neurology Consultation    Date of Service: 5/26/2024, Hospital Day: 2     Inpatient consult to Neurology  Consult performed by: Joe Alonso MD  Consult ordered by: Srinivas Andino MD  Reason for consult: stroke      Impressions: Stable subacute ischemic stroke is indeed possible due to thrombosis in the right MCA distribution of the frontal lobe ipsilateral to her known untreated right ICA disease not on aspirin antiplatelet therapy or even statin therapy.  Inability to swallow due to her encephalopathy events any therapeutic intervention with aspirin orally.    Recommendations/Plans: NPO until she passes a swallowing safety evaluation. Aspirin 300 mg rectally while we await clearance of the encephalopathy. I agree with the pravastatin initiation once she's taking oral medications.  I await carotid ultrasound and MRI brain. Austen Jefferson MD (Neurohospitalist) will follow her ongoing neurological care and management after 8:00 AM on 5-27-24.     History of Present Illness: Morena is an 84 year-old right-handed woman former smoker with past medical history risk factors for stroke including internal carotid stenosis (since 3/8/2013), hyperlipidemia-most recent (9/1/22) LDLc was 83 not on any statin therapy; heart disease/heart failure hypertension not on any prehospitalization antiplatelet therapy who presented to the [facility] ED via ambulance for altered mental status on 5-25-24. Per the 5/25/2024 ambulance record: 911 call was received at 1132 because the nurse found her confused and lethargic with an unknown last well arrived and made patient contacted 1206 and found her in bed alert but oriented x 1 no facial droop  equal speech okay with 3 liters of nasal cannula O2 in place.  Pulse oximetry was 94% pO2.  Pressure was 125/54 heart rate of 60 respirations 18 blood sugar was 149 [place] normal scale of 14.  On arrival ABG showed CO2 retention rousable  and lethargic.  Family provided additional history saying that she was lethargic yesterday but more so today.  In the ED her vitals showed a bradycardia to 49 afebrile blood pressure 131/83 SpO2 was 92% on 3 L nasal cannula.  CT of the brain without contrast (5/25/2024, 1409) showed a subtle area of hypodensity peripherally in the posterior lateral right frontal lobe that could be an acute or subacute nonhemorrhagic infarction.  This is different from her baseline study of comparison (11/18/2022).  She was admitted for metabolic encephalopathy acute hypercapnic respiratory failure CHF/COPD exacerbation urinary tract infection and neurology was consulted for the possibility of a subacute ischemic stroke.  Carotid ultrasonography, noncontrast MRI brain are pending.  Because of her nonresponsiveness and inability to swallow she was not given aspirin since arrival.    Past Medical History  History reviewed. No pertinent past medical history.    Surgical History  History reviewed. No pertinent surgical history.      Social History     Tobacco Use    Smoking status: Former     Types: Cigarettes     Passive exposure: Past    Smokeless tobacco: Never   Vaping Use    Vaping status: Former   Substance Use Topics    Alcohol use: Never    Drug use: Never     Allergies  Latex, natural rubber    Medications Prior to Admission   Medication Sig Dispense Refill Last Dose    acetaminophen (Tylenol) 325 mg tablet Take 2 tablets (650 mg) by mouth every 8 hours if needed for mild pain (1 - 3).       amLODIPine (Norvasc) 10 mg tablet Take 1 tablet (10 mg) by mouth once daily.       carvedilol (Coreg) 6.25 mg tablet Take 1 tablet (6.25 mg) by mouth 2 times daily (morning and late afternoon).       docusate sodium (Colace) 100 mg capsule Take 1 capsule (100 mg) by mouth once daily.       ergocalciferol (Vitamin D-2) 1.25 MG (77766 UT) capsule Take 1 capsule (1,250 mcg) by mouth 1 (one) time per week.       hydrALAZINE (Apresoline) 25 mg  tablet Take 1 tablet (25 mg) by mouth 3 times a day.       hydrOXYzine HCL (Atarax) 10 mg tablet Take 1 tablet (10 mg) by mouth 3 times a day.       ipratropium-albuteroL (Duo-Neb) 0.5-2.5 mg/3 mL nebulizer solution Take 3 mL by nebulization every 4 hours if needed for wheezing or shortness of breath.       isosorbide mononitrate ER (Imdur) 30 mg 24 hr tablet Take 1 tablet (30 mg) by mouth once daily. Do not crush or chew.       levothyroxine (Synthroid, Levoxyl) 100 mcg tablet Take 1 tablet (100 mcg) by mouth early in the morning.. Take on an empty stomach at the same time each day, either 30 to 60 minutes prior to breakfast       melatonin 10 mg tablet Take 5 mg by mouth once daily at bedtime.       nystatin (Mycostatin) 100,000 unit/gram powder Apply 1 Application topically 2 times a day. Apply to abd folds, babar breasts       omeprazole OTC (PriLOSEC OTC) 20 mg EC tablet Take 1 tablet (20 mg) by mouth 2 times a day before meals. Do not crush, chew, or split.       ondansetron (Zofran) 4 mg tablet Take 1 tablet (4 mg) by mouth every 6 hours if needed for nausea or vomiting.       polyethylene glycol (Glycolax, Miralax) 17 gram packet Take 17 g by mouth once daily as needed (constipation).       polymyxin B sulf-trimethoprim (Polytrim) ophthalmic solution 1 drop every 4 hours.       pravastatin (Pravachol) 40 mg tablet Take 1 tablet (40 mg) by mouth once daily at bedtime.          Review of Systems   Reason unable to perform ROS: BiPAP made this difficult; when removed she was not completely cooperative.   Neurological:  Positive for weakness. Negative for dizziness.     Neurological Exam  Mental Status  Awake and alert. Oriented only to person. Speech is normal. Language is fluent with no aphasia.    Cranial Nerves  CN II: Visual acuity is normal. Visual fields full to confrontation.  CN III, IV, VI: Extraocular movements intact bilaterally. Normal lids and orbits bilaterally. Pupils equal round and reactive to  light bilaterally.  CN V: Facial sensation is normal.  CN VII: Full and symmetric facial movement.  CN VIII: Hearing is normal.  CN IX, X: Palate elevates symmetrically. Normal gag reflex.  CN XI: Shoulder shrug strength is normal.  CN XII: Tongue midline without atrophy or fasciculations.    Motor  Normal muscle bulk throughout. No fasciculations present. Decreased muscle tone. No abnormal involuntary movements. Left hemiparesis.  Hemiparesthesias is confined to the left upper extremity with neglect; muscle tone decreased on the left upper extremities MI: No abnormal movements..    Sensory  Neglects the left side to pinch hand more than foot.    Reflexes                                            Right                      Left  Brachioradialis                    Tr                         0  Biceps                                 Tr                         0  Triceps                                Tr                         0  Patellar                                2+                         0  Achilles                                0                         0  Right Plantar: downgoing  Left Plantar: downgoing    Right pathological reflexes: Tromner absent.  Left pathological reflexes: Tromner absent.   Right palmomental absent. Left palmomental present.    Coordination    Unable to test.    Gait    Unsafe to test.    Physical Exam  Vitals and nursing note reviewed.   Constitutional:       General: She is awake.      Appearance: Normal appearance. She is normal weight.   HENT:      Head: Normocephalic and atraumatic.      Mouth/Throat:      Mouth: Mucous membranes are moist.   Eyes:      General: Lids are normal.      Extraocular Movements: Extraocular movements intact.      Pupils: Pupils are equal, round, and reactive to light.   Musculoskeletal:      Cervical back: Neck supple.   Neurological:      Mental Status: She is alert.      Deep Tendon Reflexes:      Reflex Scores:       Tricep reflexes are 0 on the  "left side.       Bicep reflexes are 0 on the left side.       Brachioradialis reflexes are 0 on the left side.       Patellar reflexes are 2+ on the right side and 0 on the left side.       Achilles reflexes are 0 on the right side and 0 on the left side.  Psychiatric:         Speech: Speech normal.       Last Recorded Vitals  Blood pressure (!) 115/104, pulse 67, temperature 36.2 °C (97.2 °F), temperature source Temporal, resp. rate 22, height 1.499 m (4' 11.02\"), weight 90 kg (198 lb 6.4 oz), SpO2 99%.    Medical Decision Making:  Unenhanced CT brain (5/25/2024 2:09 pm): Mild prominence of ventricles and sulci. There is moderate patchy hypodensity throughout the deep periventricular white matter. No acute intracranial bleed, midline shift, or focal mass effect. There is however a subtle small area of hypodensity involving cortex and white matter in the lateral posterior right frontal lobe on axial images 40 through 43/78. This was not present previously (11/18/22). I personally reviewed these images and concur with the radiological interpretation.      Results for orders placed or performed during the hospital encounter of 05/25/24 (from the past 24 hour(s))   Troponin T   Result Value Ref Range    Troponin T, High Sensitivity 25 (HH) <=14 ng/L   Basic Metabolic Panel   Result Value Ref Range    Glucose 101 (H) 65 - 99 mg/dL    Sodium 144 133 - 145 mmol/L    Potassium 4.9 3.4 - 5.1 mmol/L    Chloride 100 97 - 107 mmol/L    Bicarbonate 33 (H) 24 - 31 mmol/L    Urea Nitrogen 43 (H) 8 - 25 mg/dL    Creatinine 1.80 (H) 0.40 - 1.60 mg/dL    eGFR 27 (L) >60 mL/min/1.73m*2    Calcium 8.8 8.5 - 10.4 mg/dL    Anion Gap 11 <=19 mmol/L   POCT GLUCOSE   Result Value Ref Range    POCT Glucose 89 74 - 99 mg/dL   Blood Gas Arterial   Result Value Ref Range    POCT pH, Arterial 7.39 7.38 - 7.42 pH    POCT pCO2, Arterial 68 (H) 38 - 42 mm Hg    POCT pO2, Arterial 79 (L) 85 - 95 mm Hg    POCT SO2, Arterial 98 94 - 100 %    POCT Oxy " Hemoglobin, Arterial 95.4 94.0 - 98.0 %    POCT Base Excess, Arterial 13.1 (H) -2.0 - 3.0 mmol/L    POCT HCO3 Calculated, Arterial 41.2 (H) 22.0 - 26.0 mmol/L    Patient Temperature 37.0 degrees Celsius    FiO2 60 %    Tidal Volume 378 mL    Frequency (BPM) 16 bpm    Ipap CMH2O 18.0 cm H2O    Epap CMH2O 6.0 cm H2O    Site of Arterial Puncture Radial Right     Rg's Test Positive    POCT GLUCOSE   Result Value Ref Range    POCT Glucose 84 74 - 99 mg/dL      I personally spent 62 minutes (pre-visit review: 9:47 AM to 10:10 AM, in-person: 12:40 PM to 1:11 PM; and then 9:29 PM to 9:36 PM in chart completion) providing care for her, including preparation, verbal huddle with nursing, my direct face-to-face time with her, including education and counseling regarding her neurological condition and management plan, including chart documentation and other services, including review of her past medical records, imaging and other diagnostic results, and coordination of care as specified in the encounter.     Joe Alonso MD  [facility] Neurohospitalist  (contact by secure message preferred)

## 2024-05-26 NOTE — CARE PLAN
Problem: Skin  Goal: Promote/optimize nutrition  5/25/2024 2049 by Mare Rodriguez RN  Flowsheets (Taken 5/25/2024 2049)  Promote/optimize nutrition:   Assist with feeding   Monitor/record intake including meals   Consume > 50% meals/supplements   Offer water/supplements/favorite foods   Discuss with provider if NPO > 2 days   Reassess MST if dietician not consulted  5/25/2024 2048 by Mare Rodriguez RN  Outcome: Progressing  5/25/2024 2039 by Mare Rodriguez RN  Outcome: Progressing   The patient's goals for the shift include UTD    The clinical goals for the shift include Maintain oxygenation    Over the shift, the patient did not make progress toward the following goals. Barriers to progression include . Recommendations to address these barriers include .

## 2024-05-26 NOTE — CONSULTS
"Pulmonary Consult Note    Chief Complaint   Patient presents with    Altered Mental Status     Nurse states she is more confused today, doesn't know when last known well is. States patient is usually alert and oriented x 3 and is now x 1-2        Reason for consultation:  Critical care management, acute respiratory failure    History Of Present Illness  Morena Joshi is a 84 y.o. female presenting with lethargy.  History obtained by talking with bedside RN, hospital staff as well as chart review.  Patient is currently altered and unable to provide any history.  She was brought to the ER for altered mental status.  She was found to have CO2 retention.  She was placed empirically on BiPAP.  Currently being treated for urinary tract infection.  CT of the head also showed small subacute stroke.  Given continuous BiPAP needs she was admitted to the ICU and were consulted for this.  No history is forthcoming from patient     Past Medical History  COPD, hypertension, hypothyroidism, hyperlipidemia    Surgical History  She has no past surgical history on file.     Social History  She reports that she has quit smoking. Her smoking use included cigarettes. She has been exposed to tobacco smoke. She has never used smokeless tobacco. She reports that she does not drink alcohol and does not use drugs.    Family History  Unlikely to contribute given advanced age     Allergies  Latex, natural rubber    Review of Systems   Unable to perform ROS: Mental status change       Last Recorded Vitals  Blood pressure (!) 125/102, pulse 53, temperature 36 °C (96.8 °F), temperature source Temporal, resp. rate 18, height 1.499 m (4' 11.02\"), weight 90.6 kg (199 lb 11.8 oz), SpO2 96%.     Physical Exam  Vitals reviewed.   Constitutional:       Appearance: She is obese.      Interventions: Face mask in place.      Comments: BiPAP facemask   HENT:      Head: Normocephalic and atraumatic.   Cardiovascular:      Rate and Rhythm: Normal rate and " regular rhythm.   Pulmonary:      Effort: Pulmonary effort is normal.      Breath sounds: Decreased breath sounds and wheezing present.      Comments: Exhaled tidal volumes of high 300s on the BiPAP.  Minute ventilation of 6 L/min on average  Abdominal:      Palpations: Abdomen is soft.      Tenderness: There is no abdominal tenderness.   Musculoskeletal:      Right lower leg: No edema.      Left lower leg: No edema.   Neurological:      Mental Status: She is lethargic.         Meds  Scheduled medications  amLODIPine, 10 mg, oral, Daily  aspirin, 81 mg, oral, Daily  atorvastatin, 40 mg, oral, Nightly  carvedilol, 6.25 mg, oral, BID  docusate sodium, 100 mg, oral, Daily  hydrALAZINE, 25 mg, oral, TID  isosorbide mononitrate ER, 30 mg, oral, Daily  levothyroxine, 100 mcg, oral, Daily  nystatin, 1 Application, Topical, BID      Continuous medications     PRN medications  PRN medications: acetaminophen, hydrALAZINE, ipratropium-albuteroL, oxygen, polyethylene glycol       Relevant Results  CHEM comprehensive panel reviewed and notable for creatinine of 1.80 mg/dL  CBC reviewed  Yesterday's blood gas likely venous specimen, suggestive of hypercarbia.  Urinalysis showing positive leukocyte esterase, pyuria  Chest x-ray per my eye portable technique showing scattered infiltrates that could be in the setting of edema  CT of the head report reviewed showing called hypodensity in the right posterior lateral frontal lobe    Principal Problem:    Somnolence       Recommendations  Acute on probably chronic respiratory failure with hypoxia and hypercarbia  Within goals of care, would recommend continuing with continuous BiPAP for now.  Assess based on mental status for ability to liberate  Will repeat blood gas  Metabolic encephalopathy: Stroke versus urinary tract infection versus hypercarbia  Neurology has been consulted by primary team  Wheezing/acute bronchospasm: Partly cardiac asthma versus intrinsic lung disease  Already  been given a dose of Lasix  Continue with bronchodilators  Urinary tract infection  Continue with ceftriaxone  Follow-up urine cultures  CODE STATUS noted as DNR/DNI.  Prognosis is quite guarded given age, baseline dementia and chronic lung conditions.  Will continue to trend     Aman Elder MD  Pulmonary/Critical Care Physician

## 2024-05-26 NOTE — PROGRESS NOTES
Morena Joshi is a 84 y.o. female on day 1 of admission presenting with Somnolence.      Subjective   The patient is obtunded and poorly responsive     Objective     Last Recorded Vitals  BP (!) 125/102 (BP Location: Left arm, Patient Position: Lying)   Pulse 53   Temp 36 °C (96.8 °F) (Temporal)   Resp 18   Wt 90.6 kg (199 lb 11.8 oz)   SpO2 96%   Intake/Output last 3 Shifts:    Intake/Output Summary (Last 24 hours) at 5/26/2024 1004  Last data filed at 5/26/2024 0800  Gross per 24 hour   Intake 0 ml   Output 425 ml   Net -425 ml       Admission Weight  Weight: 100 kg (220 lb 7.4 oz) (05/25/24 1238)    Daily Weight  05/25/24 : 90.6 kg (199 lb 11.8 oz)    Image Results  CT head wo IV contrast  Narrative: Interpreted By:  Sreekanth Nguyen,   STUDY:  CT HEAD WO IV CONTRAST;  5/25/2024 2:09 pm      INDICATION:  Signs/Symptoms:altered mental status.      COMPARISON:  Prior exam from 11/18/2022..      ACCESSION NUMBER(S):  [id number]      ORDERING CLINICIAN:  JHONATAN PADILLA      TECHNIQUE:  Routine axial images were obtained from the skull base through the  vertex.  Sagittal and coronal reconstruction images were generated.  Brain, subdural, and bone windows were reviewed. N/A Unavailable      FINDINGS:  INTRACRANIAL:  Mild prominence of ventricles and sulci. There is moderate patchy  hypodensity throughout the deep periventricular white matter. No  acute intracranial bleed, midline shift, or focal mass effect. There  is however a subtle small area of hypodensity involving cortex and  white matter in the lateral posterior right frontal lobe on axial  images 40 through 43/78. This was not present previously. No  destructive bone lesion. No depressed skull fracture. Skullbase  arterial calcifications in the carotid siphons and vertebral arteries.      EXTRACRANIAL:  There is mild inflammatory material posteriorly in the right sphenoid  sinus. Otherwise, the visualized paranasal sinuses were clear.  Visualized  mastoid air cells were clear.      Impression: There is a subtle area of hypodensity peripherally in the  posterolateral right frontal lobe as described. This could be acute  or subacute nonhemorrhagic infarct. This was not present previously.  Suggest further evaluation with MR I.      No acute intracranial bleed. No midline shift.      Mild volume loss.      Moderate chronic white matter ischemic disease in the deep  periventricular regions.      Mild acute right sphenoid sinusitis.      MACRO:  Sreekanth Nguyen discussed the significance and urgency of this critical  finding by epic secure chat with  JHONATAN PADILLA on 5/25/2024 at  2:27 pm.  (**-RCF-**) Findings:  See findings.      Signed by: Sreekanth Nguyen 5/25/2024 2:28 PM  Dictation workstation:   [id number]  XR chest 1 view  Narrative: Interpreted By:  Jose Miguel Smith,   STUDY:  XR CHEST 1 VIEW;  5/25/2024 1:14 pm      INDICATION:  Signs/Symptoms:confusion.      COMPARISON:  03/09/2023      ACCESSION NUMBER(S):  [id number]      ORDERING CLINICIAN:  JHONATAN PADILLA      FINDINGS:  Scattered bilateral airspace opacities. Pleural effusions not  excluded. Cardiomegaly suspected. Aortic atherosclerosis.      Impression: Scattered bilateral infiltrates or edema.      MACRO:  None      Signed by: Jose Miguel Smith 5/25/2024 1:17 PM  Dictation workstation:   [id number]      Physical Exam  Obtunded and poorly responsive  Lungs diminished breath sounds bilaterally   Heart RRR  Abd soft NT   Ext no edema   CNS Limited exam    Relevant Results  Results for orders placed or performed during the hospital encounter of 05/25/24 (from the past 24 hour(s))   CBC with Differential   Result Value Ref Range    WBC 9.4 4.4 - 11.3 x10*3/uL    nRBC 0.0 0.0 - 0.0 /100 WBCs    RBC 3.84 (L) 4.00 - 5.20 x10*6/uL    Hemoglobin 10.2 (L) 12.0 - 16.0 g/dL    Hematocrit 36.9 36.0 - 46.0 %    MCV 96 80 - 100 fL    MCH 26.6 26.0 - 34.0 pg    MCHC 27.6 (L) 32.0 - 36.0 g/dL    RDW 13.8 11.5 - 14.5  %    Platelets 290 150 - 450 x10*3/uL    Neutrophils % 70.2 40.0 - 80.0 %    Immature Granulocytes %, Automated 1.3 (H) 0.0 - 0.9 %    Lymphocytes % 15.8 13.0 - 44.0 %    Monocytes % 10.4 2.0 - 10.0 %    Eosinophils % 1.8 0.0 - 6.0 %    Basophils % 0.5 0.0 - 2.0 %    Neutrophils Absolute 6.61 (H) 1.60 - 5.50 x10*3/uL    Immature Granulocytes Absolute, Automated 0.12 0.00 - 0.50 x10*3/uL    Lymphocytes Absolute 1.49 0.80 - 3.00 x10*3/uL    Monocytes Absolute 0.98 (H) 0.05 - 0.80 x10*3/uL    Eosinophils Absolute 0.17 0.00 - 0.40 x10*3/uL    Basophils Absolute 0.05 0.00 - 0.10 x10*3/uL   Comprehensive Metabolic Panel   Result Value Ref Range    Glucose 128 (H) 65 - 99 mg/dL    Sodium 141 133 - 145 mmol/L    Potassium 5.0 3.4 - 5.1 mmol/L    Chloride 98 97 - 107 mmol/L    Bicarbonate 36 (H) 24 - 31 mmol/L    Urea Nitrogen 42 (H) 8 - 25 mg/dL    Creatinine 1.90 (H) 0.40 - 1.60 mg/dL    eGFR 26 (L) >60 mL/min/1.73m*2    Calcium 9.2 8.5 - 10.4 mg/dL    Albumin 3.7 3.5 - 5.0 g/dL    Alkaline Phosphatase 97 35 - 125 U/L    Total Protein 7.8 5.9 - 7.9 g/dL    AST 16 5 - 40 U/L    Bilirubin, Total 0.3 0.1 - 1.2 mg/dL    ALT 9 5 - 40 U/L    Anion Gap 7 <=19 mmol/L   NT Pro-BNP   Result Value Ref Range    PROBNP 7,983 (H) 0 - 624 pg/mL   Serial Troponin, Initial ([facility])   Result Value Ref Range    Troponin T, High Sensitivity 24 (HH) <=14 ng/L   Morphology   Result Value Ref Range    RBC Morphology See Below     Hypochromia Mild    Urinalysis with Reflex Culture and Microscopic   Result Value Ref Range    Color, Urine Yellow Light-Yellow, Yellow, Dark-Yellow    Appearance, Urine Turbid (N) Clear    Specific Gravity, Urine 1.017 1.005 - 1.035    pH, Urine 5.5 5.0, 5.5, 6.0, 6.5, 7.0, 7.5, 8.0    Protein, Urine 70 (1+) (A) NEGATIVE, 10 (TRACE), 20 (TRACE) mg/dL    Glucose, Urine Normal Normal mg/dL    Blood, Urine NEGATIVE NEGATIVE    Ketones, Urine NEGATIVE NEGATIVE mg/dL    Bilirubin, Urine NEGATIVE NEGATIVE    Urobilinogen,  Urine Normal Normal mg/dL    Nitrite, Urine NEGATIVE NEGATIVE    Leukocyte Esterase, Urine 500 Barrie/µL (A) NEGATIVE   Microscopic Only, Urine   Result Value Ref Range    WBC, Urine >50 (A) 1-5, NONE /HPF    WBC Clumps, Urine MANY Reference range not established. /HPF    RBC, Urine 11-20 (A) NONE, 1-2, 3-5 /HPF    Squamous Epithelial Cells, Urine 10-25 (FEW) Reference range not established. /HPF    Transitional Epithelial Cells, Urine 1-2 (FEW) Reference range not established. /HPF    Bacteria, Urine 3+ (A) NONE SEEN /HPF    Mucus, Urine FEW Reference range not established. /LPF    Hyaline Casts, Urine 3+ (A) NONE /LPF    Fine Granular Casts, Urine 3+ (A) NONE /LPF   Sars-CoV-2 PCR   Result Value Ref Range    Coronavirus 2019, PCR Not Detected Not Detected   BLOOD GAS VENOUS FULL PANEL   Result Value Ref Range    POCT pH, Venous 7.30 (L) 7.33 - 7.43 pH    POCT pCO2, Venous 83 (HH) 41 - 51 mm Hg    POCT pO2, Venous 47 (H) 35 - 45 mm Hg    POCT SO2, Venous 75 45 - 75 %    POCT Oxy Hemoglobin, Venous 74.3 45.0 - 75.0 %    POCT Hematocrit Calculated, Venous 32.0 (L) 36.0 - 46.0 %    POCT Sodium, Venous 138 136 - 145 mmol/L    POCT Potassium, Venous 5.8 (H) 3.5 - 5.3 mmol/L    POCT Chloride, Venous 102 98 - 107 mmol/L    POCT Ionized Calicum, Venous 1.17 1.10 - 1.33 mmol/L    POCT Glucose, Venous 126 (H) 74 - 99 mg/dL    POCT Lactate, Venous 0.7 0.4 - 2.0 mmol/L    POCT Base Excess, Venous 11.6 (H) -2.0 - 3.0 mmol/L    POCT HCO3 Calculated, Venous 40.8 (H) 22.0 - 26.0 mmol/L    POCT Hemoglobin, Venous 10.7 (L) 12.0 - 16.0 g/dL    POCT Anion Gap, Venous 1.0 (L) 10.0 - 25.0 mmol/L    Patient Temperature 37.0 degrees Celsius    FiO2 32 %   Carotid duplex bilateral   Result Value Ref Range    BSA 1.94 m2   Troponin T   Result Value Ref Range    Troponin T, High Sensitivity 25 (HH) <=14 ng/L   Basic Metabolic Panel   Result Value Ref Range    Glucose 101 (H) 65 - 99 mg/dL    Sodium 144 133 - 145 mmol/L    Potassium 4.9 3.4 -  5.1 mmol/L    Chloride 100 97 - 107 mmol/L    Bicarbonate 33 (H) 24 - 31 mmol/L    Urea Nitrogen 43 (H) 8 - 25 mg/dL    Creatinine 1.80 (H) 0.40 - 1.60 mg/dL    eGFR 27 (L) >60 mL/min/1.73m*2    Calcium 8.8 8.5 - 10.4 mg/dL    Anion Gap 11 <=19 mmol/L   POCT GLUCOSE   Result Value Ref Range    POCT Glucose 89 74 - 99 mg/dL         Assessment/Plan   Metabolic encephalopathy  Acute hypercapnic respiratory failure   CHF/COPD exacerbation   Urinary tract infection   Subacute ischemic stroke   Renal insufficiency, acute vs chronic   Hypertension, Dyslipidemia, Hypothyroidism  DNR CCA DNI     Plan:  Continue BiPAP.  I will repeat ABG stat  Consult pulmonary   IV lasix, obtain TTE  CBC BMP tomorrow  IV ceftriaxone, await urine and blood Cx  US carotids, MRI brain, aspirin and statin, consult neurology  Prognosis is guarded    Srinivas Andino MD

## 2024-05-26 NOTE — CARE PLAN
Problem: Pain - Adult  Goal: Verbalizes/displays adequate comfort level or baseline comfort level  5/25/2024 2048 by Mare Rodriguez RN  Outcome: Progressing  5/25/2024 2039 by Mare Rodriguez RN  Outcome: Progressing     Problem: Discharge Planning  Goal: Discharge to home or other facility with appropriate resources  5/25/2024 2048 by Mare Rodriguez RN  Outcome: Progressing  5/25/2024 2039 by Mare Rodriguez RN  Outcome: Progressing     Problem: Chronic Conditions and Co-morbidities  Goal: Patient's chronic conditions and co-morbidity symptoms are monitored and maintained or improved  5/25/2024 2048 by Mare Rodriguez RN  Outcome: Progressing  5/25/2024 2039 by Mare Rodriguez RN  Outcome: Progressing     Problem: Skin  Goal: Promote/optimize nutrition  5/25/2024 2048 by Mare Rodriguez RN  Outcome: Progressing  5/25/2024 2039 by Mare Rodriguez RN  Outcome: Progressing     Problem: General Stroke  Goal: Establish a mutual long term goal with patient by discharge  5/25/2024 2048 by Mare Rodriguez RN  Outcome: Progressing  5/25/2024 2039 by Mare Rodriguez RN  Outcome: Progressing  Goal: Demonstrate improvement in neurological exam throughout the shift  5/25/2024 2048 by Mare Rodriguez RN  Outcome: Progressing  5/25/2024 2039 by Mare Rodriguez RN  Outcome: Progressing  Goal: Participate in treatment (ie., meds, therapy) throughout shift  5/25/2024 2048 by Mare Rodriguez RN  Outcome: Progressing  5/25/2024 2039 by Mare Rodriguez RN  Outcome: Progressing  Goal: Out of bed three times today  5/25/2024 2048 by Mare Rodriguez RN  Outcome: Progressing  5/25/2024 2039 by Mare Rodriguez RN  Outcome: Progressing   The patient's goals for the shift include UTD    The clinical goals for the shift include Maintain oxygenation    Over the shift, the patient did not make progress toward the following goals. Barriers to progression include . Recommendations to address these barriers include

## 2024-05-27 LAB
ANION GAP SERPL CALC-SCNC: 13 MMOL/L
BUN SERPL-MCNC: 42 MG/DL (ref 8–25)
CALCIUM SERPL-MCNC: 9.1 MG/DL (ref 8.5–10.4)
CHLORIDE SERPL-SCNC: 100 MMOL/L (ref 97–107)
CO2 SERPL-SCNC: 32 MMOL/L (ref 24–31)
CREAT SERPL-MCNC: 1.4 MG/DL (ref 0.4–1.6)
EGFRCR SERPLBLD CKD-EPI 2021: 37 ML/MIN/1.73M*2
ERYTHROCYTE [DISTWIDTH] IN BLOOD BY AUTOMATED COUNT: 13.8 % (ref 11.5–14.5)
GLUCOSE BLD MANUAL STRIP-MCNC: 132 MG/DL (ref 74–99)
GLUCOSE BLD MANUAL STRIP-MCNC: 66 MG/DL (ref 74–99)
GLUCOSE SERPL-MCNC: 134 MG/DL (ref 65–99)
HCT VFR BLD AUTO: 37.9 % (ref 36–46)
HGB BLD-MCNC: 10.9 G/DL (ref 12–16)
MCH RBC QN AUTO: 26.5 PG (ref 26–34)
MCHC RBC AUTO-ENTMCNC: 28.8 G/DL (ref 32–36)
MCV RBC AUTO: 92 FL (ref 80–100)
NRBC BLD-RTO: 0 /100 WBCS (ref 0–0)
PLATELET # BLD AUTO: 279 X10*3/UL (ref 150–450)
POTASSIUM SERPL-SCNC: 4.6 MMOL/L (ref 3.4–5.1)
RBC # BLD AUTO: 4.11 X10*6/UL (ref 4–5.2)
SODIUM SERPL-SCNC: 145 MMOL/L (ref 133–145)
WBC # BLD AUTO: 8.5 X10*3/UL (ref 4.4–11.3)

## 2024-05-27 PROCEDURE — 2500000005 HC RX 250 GENERAL PHARMACY W/O HCPCS

## 2024-05-27 PROCEDURE — 92610 EVALUATE SWALLOWING FUNCTION: CPT | Mod: GN

## 2024-05-27 PROCEDURE — 82947 ASSAY GLUCOSE BLOOD QUANT: CPT

## 2024-05-27 PROCEDURE — 36415 COLL VENOUS BLD VENIPUNCTURE: CPT | Performed by: FAMILY MEDICINE

## 2024-05-27 PROCEDURE — 80048 BASIC METABOLIC PNL TOTAL CA: CPT | Performed by: FAMILY MEDICINE

## 2024-05-27 PROCEDURE — 94660 CPAP INITIATION&MGMT: CPT

## 2024-05-27 PROCEDURE — 2500000001 HC RX 250 WO HCPCS SELF ADMINISTERED DRUGS (ALT 637 FOR MEDICARE OP): Performed by: FAMILY MEDICINE

## 2024-05-27 PROCEDURE — 2060000001 HC INTERMEDIATE ICU ROOM DAILY

## 2024-05-27 PROCEDURE — 2500000001 HC RX 250 WO HCPCS SELF ADMINISTERED DRUGS (ALT 637 FOR MEDICARE OP): Performed by: EMERGENCY MEDICINE

## 2024-05-27 PROCEDURE — 85027 COMPLETE CBC AUTOMATED: CPT | Performed by: FAMILY MEDICINE

## 2024-05-27 RX ORDER — ACETAMINOPHEN 650 MG/1
650 SUPPOSITORY RECTAL EVERY 4 HOURS PRN
Status: DISCONTINUED | OUTPATIENT
Start: 2024-05-27 | End: 2024-06-04 | Stop reason: HOSPADM

## 2024-05-27 RX ORDER — DEXTROSE 50 % IN WATER (D50W) INTRAVENOUS SYRINGE
25 ONCE
Status: DISCONTINUED | OUTPATIENT
Start: 2024-05-27 | End: 2024-06-04 | Stop reason: HOSPADM

## 2024-05-27 RX ORDER — BISACODYL 10 MG/1
10 SUPPOSITORY RECTAL DAILY PRN
Status: DISCONTINUED | OUTPATIENT
Start: 2024-05-27 | End: 2024-06-04 | Stop reason: HOSPADM

## 2024-05-27 RX ORDER — DEXTROSE 50 % IN WATER (D50W) INTRAVENOUS SYRINGE
Status: COMPLETED
Start: 2024-05-27 | End: 2024-05-27

## 2024-05-27 RX ORDER — ASPIRIN 300 MG/1
300 SUPPOSITORY RECTAL DAILY
Status: DISCONTINUED | OUTPATIENT
Start: 2024-05-27 | End: 2024-05-28

## 2024-05-27 RX ADMIN — CARVEDILOL 6.25 MG: 6.25 TABLET, FILM COATED ORAL at 08:44

## 2024-05-27 RX ADMIN — NYSTATIN 1 APPLICATION: 100000 POWDER TOPICAL at 09:00

## 2024-05-27 RX ADMIN — ACETAMINOPHEN 650 MG: 650 SUPPOSITORY RECTAL at 04:29

## 2024-05-27 RX ADMIN — HYDRALAZINE HYDROCHLORIDE 25 MG: 25 TABLET ORAL at 08:44

## 2024-05-27 RX ADMIN — CARVEDILOL 6.25 MG: 6.25 TABLET, FILM COATED ORAL at 17:32

## 2024-05-27 RX ADMIN — HYDRALAZINE HYDROCHLORIDE 25 MG: 25 TABLET ORAL at 20:49

## 2024-05-27 RX ADMIN — DOCUSATE SODIUM 100 MG: 100 CAPSULE, LIQUID FILLED ORAL at 08:44

## 2024-05-27 RX ADMIN — NYSTATIN 1 APPLICATION: 100000 POWDER TOPICAL at 20:54

## 2024-05-27 RX ADMIN — DEXTROSE MONOHYDRATE 50 ML: 25 INJECTION, SOLUTION INTRAVENOUS at 05:04

## 2024-05-27 RX ADMIN — AMLODIPINE BESYLATE 10 MG: 10 TABLET ORAL at 08:44

## 2024-05-27 RX ADMIN — ISOSORBIDE MONONITRATE 30 MG: 30 TABLET, EXTENDED RELEASE ORAL at 08:44

## 2024-05-27 RX ADMIN — ATORVASTATIN CALCIUM 40 MG: 40 TABLET, FILM COATED ORAL at 20:49

## 2024-05-27 ASSESSMENT — PAIN SCALES - GENERAL
PAINLEVEL_OUTOF10: 0 - NO PAIN
PAINLEVEL_OUTOF10: 0 - NO PAIN
PAINLEVEL_OUTOF10: 1
PAINLEVEL_OUTOF10: 0 - NO PAIN

## 2024-05-27 ASSESSMENT — PAIN SCALES - WONG BAKER: WONGBAKER_NUMERICALRESPONSE: NO HURT

## 2024-05-27 ASSESSMENT — PAIN - FUNCTIONAL ASSESSMENT: PAIN_FUNCTIONAL_ASSESSMENT: 0-10

## 2024-05-27 NOTE — PROGRESS NOTES
Pulmonary Progress Note 05/27/24     Patient seen in follow-up for acute respiratory failure with hypoxia, critical care    Subjective   Interval History:   Off BiPAP currently.  Denies any shortness of breath but not oriented and not a reliable historian    Objective   Vital signs in last 24 hours:  Temp:  [35.8 °C (96.4 °F)-36.4 °C (97.5 °F)] 36.4 °C (97.5 °F)  Heart Rate:  [54-87] 78  Resp:  [13-36] 18  BP: ()/() 152/63  FiO2 (%):  [60 %] 60 %    Intake/Output last 3 shifts:  I/O last 3 completed shifts:  In: 0 (0 mL/kg)   Out: 200 (2.2 mL/kg) [Urine:200 (0.1 mL/kg/hr)]  Weight: 90 kg   Intake/Output this shift:  No intake/output data recorded.    Physical Exam  Vitals reviewed.   Constitutional:       Interventions: Nasal cannula in place.   HENT:      Head: Normocephalic and atraumatic.   Cardiovascular:      Rate and Rhythm: Normal rate and regular rhythm.   Pulmonary:      Effort: Pulmonary effort is normal.      Breath sounds: No wheezing.   Abdominal:      Palpations: Abdomen is soft.   Musculoskeletal:      Right lower leg: No edema.      Left lower leg: No edema.   Skin:     General: Skin is warm.   Neurological:      Mental Status: She is alert. She is disoriented.   Psychiatric:         Mood and Affect: Mood normal.         Behavior: Behavior normal.         Scheduled medications  amLODIPine, 10 mg, oral, Daily  [Held by provider] aspirin, 81 mg, oral, Daily  aspirin, 300 mg, rectal, Daily  atorvastatin, 40 mg, oral, Nightly  carvedilol, 6.25 mg, oral, BID  dextrose, 25 g, intravenous, Once  docusate sodium, 100 mg, oral, Daily  hydrALAZINE, 25 mg, oral, TID  isosorbide mononitrate ER, 30 mg, oral, Daily  levothyroxine, 100 mcg, oral, Daily  nystatin, 1 Application, Topical, BID      Continuous medications     PRN medications  PRN medications: acetaminophen, bisacodyl, hydrALAZINE, ipratropium-albuteroL, oxygen     Labs:  Lab Results   Component Value Date     05/27/2024     K 4.6 05/27/2024     05/27/2024    CO2 32 (H) 05/27/2024    BUN 42 (H) 05/27/2024    CREATININE 1.40 05/27/2024    GLUCOSE 134 (H) 05/27/2024    CALCIUM 9.1 05/27/2024     Lab Results   Component Value Date    WBC 8.5 05/27/2024    HGB 10.9 (L) 05/27/2024    HCT 37.9 05/27/2024    MCV 92 05/27/2024     05/27/2024       Imaging:  Carotid duplex bilateral    Result Date: 5/26/2024  Interpreted By:  Jose Miguel Smith, STUDY: [facility] US CAROTID ARTERY DUPLEX BILATERAL;  5/25/2024 7:55 pm   INDICATION: Signs/Symptoms:CVA.   COMPARISON: None.   ACCESSION NUMBER(S): [id number]   ORDERING CLINICIAN: ANTONIO FOX   TECHNIQUE: Vascular ultrasound of the extracranial carotid system was performed bilaterally.  Gray scale, color Doppler and spectral Doppler waveform analysis was performed.   FINDINGS: RIGHT: On the right,  there is scattered atherosclerotic plaque. Otherwise there is antegrade flow with expected arterial waveforms. The peak systolic velocities are as follows:   RIGHT SIDE PEAK SYSTOLIC/END-DIASTOLIC VELOCITY TABLE: CCA 83/23 cm/sec. /149 cm/sec. ECA 77 cm/sec.   The ratio of the peak systolic velocity of the right ICA/CCA is 4.1.   RIGHT VERTEBRAL ARTERY: The right vertebral artery is patent with normal antegrade blood flow and peak systolic velocity measuring  82 cm/sec.   LEFT: On the left,  there is scattered atherosclerotic plaque. The patient did not lot allow scanning of the entire left carotid system. The peak systolic velocities are as follows:   LEFT SIDE PEAK SYSTOLIC/END-DIASTOLIC VELOCITY TABLE: CCA 95/27 cm/sec. ICA not obtained ECA not obtain   LEFT VERTEBRAL ARTERY: Not scanned..       1. Hemodynamic changes suggesting a stenosis of greater than 70% in the right internal carotid artery. 2. Antegrade flow in the right vertebral artery. 3. The patient did not allow scanning of the left internal carotid and vertebral arteries.   Erin HL, Soumya T, Deonte H, et al. Optimization of  duplex velocity criteria for diagnosis of internal carotid artery (ICA) stenosis: A report of the [employer] ([employer]AC) Vascular Testing Division Carotid Diagnostic Criteria Committee. Vascular Medicine. 2021;26(5):515-347   Signed by: Jose Miguel Sarah 5/26/2024 10:17 AM Dictation workstation:   [id number]    CT head wo IV contrast    Result Date: 5/25/2024  Interpreted By:  Sreekanth Nguyen, STUDY: CT HEAD WO IV CONTRAST;  5/25/2024 2:09 pm   INDICATION: Signs/Symptoms:altered mental status.   COMPARISON: Prior exam from 11/18/2022..   ACCESSION NUMBER(S): [id number]   ORDERING CLINICIAN: JHONATAN PADILLA   TECHNIQUE: Routine axial images were obtained from the skull base through the vertex.  Sagittal and coronal reconstruction images were generated. Brain, subdural, and bone windows were reviewed. N/A Unavailable   FINDINGS: INTRACRANIAL: Mild prominence of ventricles and sulci. There is moderate patchy hypodensity throughout the deep periventricular white matter. No acute intracranial bleed, midline shift, or focal mass effect. There is however a subtle small area of hypodensity involving cortex and white matter in the lateral posterior right frontal lobe on axial images 40 through 43/78. This was not present previously. No destructive bone lesion. No depressed skull fracture. Skullbase arterial calcifications in the carotid siphons and vertebral arteries.   EXTRACRANIAL: There is mild inflammatory material posteriorly in the right sphenoid sinus. Otherwise, the visualized paranasal sinuses were clear. Visualized mastoid air cells were clear.       There is a subtle area of hypodensity peripherally in the posterolateral right frontal lobe as described. This could be acute or subacute nonhemorrhagic infarct. This was not present previously. Suggest further evaluation with MR I.   No acute intracranial bleed. No midline shift.   Mild volume loss.   Moderate chronic white matter ischemic disease  in the deep periventricular regions.   Mild acute right sphenoid sinusitis.   MACRO: Sreekanth Nguyen discussed the significance and urgency of this critical finding by epic secure chat with  JHONATAN PADILLA on 5/25/2024 at 2:27 pm.  (**-RCF-**) Findings:  See findings.   Signed by: Sreekanth Nguyen 5/25/2024 2:28 PM Dictation workstation:   [id number]    XR chest 1 view    Result Date: 5/25/2024  Interpreted By:  Jose Miguel Smith, STUDY: XR CHEST 1 VIEW;  5/25/2024 1:14 pm   INDICATION: Signs/Symptoms:confusion.   COMPARISON: 03/09/2023   ACCESSION NUMBER(S): [id number]   ORDERING CLINICIAN: JHONATAN PADILLA   FINDINGS: Scattered bilateral airspace opacities. Pleural effusions not excluded. Cardiomegaly suspected. Aortic atherosclerosis.       Scattered bilateral infiltrates or edema.   MACRO: None   Signed by: Jose Miguel Smith 5/25/2024 1:17 PM Dictation workstation:   [id number]              Assessment/Plan   Principal Problem:    Somnolence    Acute on probably chronic respiratory failure with hypoxia and hypercarbia: Resolved  Continue with supplemental oxygen  BiPAP as needed  Metabolic encephalopathy: Stroke versus urinary tract infection versus hypercarbia.  More alert than yesterday, remains disoriented and confused  Neurology note reviewed.  Passed a swallow assessment it seems.  Aspirin per neurology  Wheezing/acute bronchospasm: Partly cardiac asthma versus intrinsic lung disease: No active wheezing on my exam  Continue with bronchodilators  Urinary tract infection  Continue with ceftriaxone  Follow-up urine cultures  CODE STATUS noted as DNR/DNI.  Prognosis is quite guarded given age, baseline dementia and chronic lung conditions.  Will continue to trend  No absolute contraindication to transfer out of the ICU today       LOS: 2 days       Aman Elder MD  Pulmonary/Critical Care medicine

## 2024-05-27 NOTE — CARE PLAN
Problem: Skin  Goal: Promote/optimize nutrition  5/26/2024 2133 by Mare Rodriguez RN  Flowsheets (Taken 5/26/2024 2133)  Promote/optimize nutrition:   Assist with feeding   Monitor/record intake including meals   Consume > 50% meals/supplements   Offer water/supplements/favorite foods   Discuss with provider if NPO > 2 days   Reassess MST if dietician not consulted  5/26/2024 2133 by Mare Rodriguez RN  Outcome: Progressing  Flowsheets (Taken 5/26/2024 2133)  Promote/optimize nutrition:   Assist with feeding   Monitor/record intake including meals   Consume > 50% meals/supplements   Offer water/supplements/favorite foods   Discuss with provider if NPO > 2 days   Reassess MST if dietician not consulted   The patient's goals for the shift include UTD    The clinical goals for the shift include Monitor oxygen and vitals    Over the shift, the patient did not make progress toward the following goals. Barriers to progression include . Recommendations to address these barriers include .

## 2024-05-27 NOTE — NURSING NOTE
0700 assumed care of pt pt is resting in bed, awake and alert. Not oriented except to person. Sitter is at bedside. - this RN will assess if need sitter. Pt is on 5 liters nasal cannula spo2 94% no s/s of distress. Neuro exam is done, pt somewhat able to follow commands. Purewick in place with incontinent episode noted as well. Pt skin intact however abdominal folds red and excoriated. No IVF and pt is NPO. Will assess pt to see if can advanced diet  0800 able to complete bedside swallow. Pt did fine no cough afterwards or s/s aspiration. Messaged to hospitalist, okay to do medications.   0900 medications given- one at a time. Pt did good. Hospitalist ordered regular diet. Will order food and assist pt with feed. Transfer out of ICU.   1000 dtr called for update, update provided at this time

## 2024-05-27 NOTE — PROGRESS NOTES
Speech-Language Pathology    SLP Adult Inpatient Speech-Language Pathology Clinical Swallow Evaluation    Patient Name: Morena Joshi  MRN: [id number]  Today's Date: 5/27/2024   Time Calculation  Start Time: 1115  Stop Time: 1140  Time Calculation (min): 25 min         Current Problem:   1. Somnolence        2. Altered mental status, unspecified altered mental status type        3. Acute UTI        4. Ischemic stroke (Multi)  Transthoracic Echo (TTE) Complete    Transthoracic Echo (TTE) Complete    Carotid duplex bilateral    Carotid duplex bilateral      5. Acute respiratory failure with hypoxia and hypercarbia (Multi)        6. Ac isch multi vasc territories stroke (Multi)  Transthoracic Echo (TTE) Complete    Transthoracic Echo (TTE) Complete    Carotid duplex bilateral    Carotid duplex bilateral        Past Medical History  COPD, HTN, HLD, Hypothyroidism      Reason for Referral:  Pt presented to ED on 5/25/24 from [facility] for increased lethargy and confusion.  Pt admitted for dx of UTI and AMS with further work-up ordered to rule out neurological etiology.  Pt referred to Speech-Language Pathology services for Clinical Swallow Evaluation in order to assess current swallow skills and determine plan of treatment.      Recommendations:  Additional Recommendations: Dysphagia treatment  Solid Diet Recommendations : Regular (IDDSI Level 7), Easy to Chew  Liquid Diet Recommendations: Thin (IDDSI Level 0)  Compensatory Swallowing Strategies: Upright 90 degrees as possible for all oral intake, Small bites/sips, Alternate solids and liquids  Medication Administration Recommendations: Whole, With Liquid  Follow up treatments: Diet tolerance monitoring, Patient/family education        Assessment:  Assessment Results: Pt presents with mild oral phase dysphagia characterized by prolonged but efficient mastication with regular solids.  Recommend regular diet, easy to chew and thin liquids with follow-up dysphagia  therapy.      Plan:  Inpatient/Swing Bed or Outpatient: Inpatient  Treatment/Interventions: Oral motor exercises, Diet recommendations, Assess diet tolerance  SLP Plan: Skilled SLP  SLP Frequency: 3x per week  Duration: Current admission  SLP Discharge Recommendations: Continue skilled SLP services at the next level of care  Diet Recommendations: Solid, Liquid  Solid Consistency: Regular (IDDSI Level 7), Easy to Chew  Liquid Consistency: Thin (IDDSI Level 0)  Next Treatment Priority: Assess diet tolerance  Discussed POC: Patient, Caregiver/family  Discussed Risks/Benefits: Yes  Patient/Caregiver Agreeable: Yes    Dysphagia Goals: (Established 5/27/24, projected discontinuation 2 weeks)    Pt will safely swallow recommended diet (reg, easy to chew/thin) without s/s aspiration for 90% of observed trials in order to maintain adequate nutrition and hydration.   CURRENT STATUS: 75%   PROGRESS: goal initiated this date    Pt will utilize safe swallow strategies with 75% acc in order to reduce risk of aspiration and s/s dysphagia.  CURRENT STATUS: 25%   PROGRESS: goal initiated this date    Pt will complete oral and/or pharyngeal ROM and strengthening exercises in order to improve swallowing skills with least restrictive diet.   CURRENT STATUS: Not attempted   PROGRESS: goal initiated this date      Subjective   Pt seen at bedside, sitting upright in bed with 2 family members present.      General Visit Information:  Ordering Physician: Dr. Andino  Reason for Referral: Assess swallowing  Past Medical History Relevant to Rehab: COPD, HTN, HLD, Hypothyroidism      Objective     Clinical Swallow Evaluation completed consisting of patient/caregiver interview, oral motor assessment, and analysis of swallow abilities with PO trials ( 4 oz water via cup and straw, puree, soft solids, regular solids.)    ORAL PHASE:   Oral mucosa were pink, moist, and free of obvious lesions.   Mastication of regular solids was prolonged but  efficient.  Bolus formation,  A-P transport, and oral clearance were adequate.  PHARYNGEAL PHASE:   Laryngeal elevation was visualized with all trials, however adequacy of hyolaryngeal elevation/excursion cannot be determined at bedside.   No overt (immediate or delayed) s/s aspiration were demonstrated with any consistencies.  Vocal quality clear post all swallows.      Baseline Assessment:  Respiratory Status: Oxygen via nasal cannula  Patient Positioning: Upright in Bed  Baseline Vocal Quality: Normal      Pain:  Pain Assessment: 0-10  Pain Score: 0 - No pain       Oral/Motor Assessment:  Oral Hygiene: WFL  Dentition: lower natural with some missing; upper edentulous.  Pt has upper denture plate but rarely wears to eat.  Labial ROM: Within Functional Limits  Lingual ROM: Within Functional Limits      Consistencies Trialed:  Consistencies Trialed: Yes  Consistencies Trialed: Thin (IDDSI Level 0) - Straw, Thin (IDDSI Level 0) - Cup, Soft & bite sized/chopped (IDDSI Level 6), Regular (IDDSI Level 7)      Clinical Observations:  Patient Positioning: Upright in Bed  Was The 3 oz Swallow Protocol Completed: Yes  Prolonged Oral Manipulation: Regular (IDDSI Level 7)  Impaired Mastication: Regular (IDDSI Level 7), prolonged but efficient    Inpatient Education:  Individual(s) Educated: Patient, Other (family members present)  Verbal Education : Diet rec, POC  Risk and Benefits Discussed with Patient/Caregiver/Other: yes  Patient/Caregiver Demonstrated Understanding: yes  Plan of Care Discussed and Agreed Upon: yes  Patient Response to Education: Patient/Caregiver Verbalized Understanding of Information

## 2024-05-27 NOTE — PROGRESS NOTES
"Notified by nurse that she decided to randomly check the patient's blood sugar (fingerstick glucose) since the patient is NPO. Patient is confused so unable to determine if the patient was symptomatic. Blood sugar was 66 so the nurse overrode the Pyxis and pulled out an amp of D50W and gave the entire amp \"per ACLS guidelines.\"  "

## 2024-05-27 NOTE — PROGRESS NOTES
Morena Joshi is a 84 y.o. female on day 2 of admission presenting with Somnolence.      Subjective   The patient is much more awake today, continues to be confused     Objective     Last Recorded Vitals  /63   Pulse 78   Temp 36.4 °C (97.5 °F) (Temporal)   Resp 18   Wt 90 kg (198 lb 6.4 oz)   SpO2 98%   Intake/Output last 3 Shifts:    Intake/Output Summary (Last 24 hours) at 5/27/2024 1036  Last data filed at 5/27/2024 0900  Gross per 24 hour   Intake 0 ml   Output 0 ml   Net 0 ml       Admission Weight  Weight: 100 kg (220 lb 7.4 oz) (05/25/24 1238)    Daily Weight  05/26/24 : 90 kg (198 lb 6.4 oz)    Image Results  Carotid duplex bilateral  Narrative: Interpreted By:  Jose Miguel Smith,   STUDY:  [facility] US CAROTID ARTERY DUPLEX BILATERAL;  5/25/2024 7:55 pm      INDICATION:  Signs/Symptoms:CVA.      COMPARISON:  None.      ACCESSION NUMBER(S):  [id number]      ORDERING CLINICIAN:  ANTONIO FOX      TECHNIQUE:  Vascular ultrasound of the extracranial carotid system was performed  bilaterally.  Gray scale, color Doppler and spectral Doppler waveform  analysis was performed.      FINDINGS:  RIGHT:  On the right,  there is scattered atherosclerotic plaque. Otherwise  there is antegrade flow with expected arterial waveforms. The peak  systolic velocities are as follows:      RIGHT SIDE PEAK SYSTOLIC/END-DIASTOLIC VELOCITY TABLE:  CCA 83/23 cm/sec.  /149 cm/sec.  ECA 77 cm/sec.      The ratio of the peak systolic velocity of the right ICA/CCA is 4.1.      RIGHT VERTEBRAL ARTERY:  The right vertebral artery is patent with normal antegrade blood flow  and peak systolic velocity measuring  82 cm/sec.      LEFT:  On the left,  there is scattered atherosclerotic plaque. The patient  did not lot allow scanning of the entire left carotid system. The  peak systolic velocities are as follows:      LEFT SIDE PEAK SYSTOLIC/END-DIASTOLIC VELOCITY TABLE:  CCA 95/27 cm/sec.  ICA not obtained  ECA not obtain      LEFT  VERTEBRAL ARTERY:  Not scanned..      Impression: 1. Hemodynamic changes suggesting a stenosis of greater than 70% in  the right internal carotid artery.  2. Antegrade flow in the right vertebral artery.  3. The patient did not allow scanning of the left internal carotid  and vertebral arteries.      Erin HL, Soumya T, Deonte H, et al. Optimization of duplex  velocity criteria for diagnosis of internal carotid artery (ICA)  stenosis: A report of the [employer]reditation Commission  (IAC) Vascular Testing Division Carotid Diagnostic Criteria  Committee. Vascular Medicine. 2021;26(5):515-525      Signed by: Jose Miguel Smith 5/26/2024 10:17 AM  Dictation workstation:   [id number]      Physical Exam  Awake and confused  Lungs diminished breath sounds bilaterally   Heart RRR  Abd soft NT   Ext no edema   CNS Limited exam    Relevant Results  Results for orders placed or performed during the hospital encounter of 05/25/24 (from the past 24 hour(s))   POCT GLUCOSE   Result Value Ref Range    POCT Glucose 84 74 - 99 mg/dL   POCT GLUCOSE   Result Value Ref Range    POCT Glucose 66 (L) 74 - 99 mg/dL   POCT GLUCOSE   Result Value Ref Range    POCT Glucose 132 (H) 74 - 99 mg/dL   Basic Metabolic Panel   Result Value Ref Range    Glucose 134 (H) 65 - 99 mg/dL    Sodium 145 133 - 145 mmol/L    Potassium 4.6 3.4 - 5.1 mmol/L    Chloride 100 97 - 107 mmol/L    Bicarbonate 32 (H) 24 - 31 mmol/L    Urea Nitrogen 42 (H) 8 - 25 mg/dL    Creatinine 1.40 0.40 - 1.60 mg/dL    eGFR 37 (L) >60 mL/min/1.73m*2    Calcium 9.1 8.5 - 10.4 mg/dL    Anion Gap 13 <=19 mmol/L   CBC   Result Value Ref Range    WBC 8.5 4.4 - 11.3 x10*3/uL    nRBC 0.0 0.0 - 0.0 /100 WBCs    RBC 4.11 4.00 - 5.20 x10*6/uL    Hemoglobin 10.9 (L) 12.0 - 16.0 g/dL    Hematocrit 37.9 36.0 - 46.0 %    MCV 92 80 - 100 fL    MCH 26.5 26.0 - 34.0 pg    MCHC 28.8 (L) 32.0 - 36.0 g/dL    RDW 13.8 11.5 - 14.5 %    Platelets 279 150 - 450 x10*3/uL         Assessment/Plan    Metabolic encephalopathy  Acute hypercapnic respiratory failure   CHF/COPD exacerbation   Urinary tract infection   Subacute ischemic stroke   Stenosis of greater than 70% in the right internal carotid artery  Acute kidney injury  Hypertension, Dyslipidemia, Hypothyroidism  DNR CCA DNI     Plan:  Supplemental oxygen, IV Lasix, TTE  IV ceftriaxone, await urine and blood Cx  Continue aspirin and statin  Status was changed to Stepdown  PT OT evaluation and treatment    Srinivas Andino MD

## 2024-05-27 NOTE — CARE PLAN
Problem: Pain - Adult  Goal: Verbalizes/displays adequate comfort level or baseline comfort level  Outcome: Progressing     Problem: Discharge Planning  Goal: Discharge to home or other facility with appropriate resources  Outcome: Progressing     Problem: Chronic Conditions and Co-morbidities  Goal: Patient's chronic conditions and co-morbidity symptoms are monitored and maintained or improved  Outcome: Progressing     Problem: Skin  Goal: Promote/optimize nutrition  Outcome: Progressing     Problem: General Stroke  Goal: Establish a mutual long term goal with patient by discharge  Outcome: Progressing  Goal: Demonstrate improvement in neurological exam throughout the shift  Outcome: Progressing  Goal: Participate in treatment (ie., meds, therapy) throughout shift  Outcome: Progressing  Goal: Out of bed three times today  Outcome: Progressing     Problem: Respiratory  Goal: No signs of respiratory distress (eg. Use of accessory muscles. Peds grunting)  Outcome: Progressing   The patient's goals for the shift include UTD    The clinical goals for the shift include Monitor oxygen and vitals    Over the shift, the patient did not make progress toward the following goals. Barriers to progression include . Recommendations to address these barriers include .

## 2024-05-28 ENCOUNTER — APPOINTMENT (OUTPATIENT)
Dept: CARDIOLOGY | Facility: HOSPITAL | Age: 84
DRG: 871 | End: 2024-05-28
Payer: MEDICARE

## 2024-05-28 LAB
ANION GAP SERPL CALC-SCNC: 8 MMOL/L
AORTIC VALVE MEAN GRADIENT: 2 MMHG
AORTIC VALVE PEAK VELOCITY: 0.98 M/S
APPARATUS: ABNORMAL
ARTERIAL PATENCY WRIST A: POSITIVE
AV PEAK GRADIENT: 3.8 MMHG
AVA (PEAK VEL): 1.82 CM2
AVA (VTI): 1.49 CM2
BACTERIA UR CULT: ABNORMAL
BASE EXCESS BLDA CALC-SCNC: 14.9 MMOL/L (ref -2–3)
BODY TEMPERATURE: 37 DEGREES CELSIUS
BUN SERPL-MCNC: 33 MG/DL (ref 8–25)
CALCIUM SERPL-MCNC: 9 MG/DL (ref 8.5–10.4)
CHLORIDE SERPL-SCNC: 102 MMOL/L (ref 97–107)
CO2 SERPL-SCNC: 36 MMOL/L (ref 24–31)
CREAT SERPL-MCNC: 1.1 MG/DL (ref 0.4–1.6)
EGFRCR SERPLBLD CKD-EPI 2021: 50 ML/MIN/1.73M*2
EJECTION FRACTION APICAL 4 CHAMBER: 53.7
ERYTHROCYTE [DISTWIDTH] IN BLOOD BY AUTOMATED COUNT: 14.1 % (ref 11.5–14.5)
FLOW: 5 LPM
GLUCOSE SERPL-MCNC: 78 MG/DL (ref 65–99)
HCO3 BLDA-SCNC: 42.1 MMOL/L (ref 22–26)
HCT VFR BLD AUTO: 36.3 % (ref 36–46)
HGB BLD-MCNC: 10.5 G/DL (ref 12–16)
INHALED O2 CONCENTRATION: 40 %
LEFT VENTRICLE INTERNAL DIMENSION DIASTOLE: 5 CM (ref 3.5–6)
LEFT VENTRICULAR OUTFLOW TRACT DIAMETER: 1.9 CM
MCH RBC QN AUTO: 26.3 PG (ref 26–34)
MCHC RBC AUTO-ENTMCNC: 28.9 G/DL (ref 32–36)
MCV RBC AUTO: 91 FL (ref 80–100)
MITRAL VALVE E/E' RATIO: 102
NRBC BLD-RTO: 0 /100 WBCS (ref 0–0)
OXYHGB MFR BLDA: 94.2 % (ref 94–98)
PCO2 BLDA: 62 MM HG (ref 38–42)
PH BLDA: 7.44 PH (ref 7.38–7.42)
PLATELET # BLD AUTO: 272 X10*3/UL (ref 150–450)
PO2 BLDA: 74 MM HG (ref 85–95)
POTASSIUM SERPL-SCNC: 4.1 MMOL/L (ref 3.4–5.1)
RBC # BLD AUTO: 3.99 X10*6/UL (ref 4–5.2)
RIGHT VENTRICLE FREE WALL PEAK S': 10.6 CM/S
RIGHT VENTRICLE PEAK SYSTOLIC PRESSURE: 58.6 MMHG
SAO2 % BLDA: 97 % (ref 94–100)
SODIUM SERPL-SCNC: 146 MMOL/L (ref 133–145)
SPECIMEN DRAWN FROM PATIENT: ABNORMAL
WBC # BLD AUTO: 9.1 X10*3/UL (ref 4.4–11.3)

## 2024-05-28 PROCEDURE — 2500000001 HC RX 250 WO HCPCS SELF ADMINISTERED DRUGS (ALT 637 FOR MEDICARE OP): Performed by: INTERNAL MEDICINE

## 2024-05-28 PROCEDURE — 36600 WITHDRAWAL OF ARTERIAL BLOOD: CPT

## 2024-05-28 PROCEDURE — 2060000001 HC INTERMEDIATE ICU ROOM DAILY

## 2024-05-28 PROCEDURE — 2500000005 HC RX 250 GENERAL PHARMACY W/O HCPCS: Performed by: INTERNAL MEDICINE

## 2024-05-28 PROCEDURE — 82805 BLOOD GASES W/O2 SATURATION: CPT | Performed by: INTERNAL MEDICINE

## 2024-05-28 PROCEDURE — 2500000005 HC RX 250 GENERAL PHARMACY W/O HCPCS: Performed by: FAMILY MEDICINE

## 2024-05-28 PROCEDURE — 2500000001 HC RX 250 WO HCPCS SELF ADMINISTERED DRUGS (ALT 637 FOR MEDICARE OP): Performed by: FAMILY MEDICINE

## 2024-05-28 PROCEDURE — 36415 COLL VENOUS BLD VENIPUNCTURE: CPT | Performed by: FAMILY MEDICINE

## 2024-05-28 PROCEDURE — 97166 OT EVAL MOD COMPLEX 45 MIN: CPT | Mod: GO

## 2024-05-28 PROCEDURE — 92526 ORAL FUNCTION THERAPY: CPT | Mod: GN | Performed by: SPEECH-LANGUAGE PATHOLOGIST

## 2024-05-28 PROCEDURE — 97530 THERAPEUTIC ACTIVITIES: CPT | Mod: GP

## 2024-05-28 PROCEDURE — 93306 TTE W/DOPPLER COMPLETE: CPT | Performed by: INTERNAL MEDICINE

## 2024-05-28 PROCEDURE — 85027 COMPLETE CBC AUTOMATED: CPT | Performed by: FAMILY MEDICINE

## 2024-05-28 PROCEDURE — 80048 BASIC METABOLIC PNL TOTAL CA: CPT | Performed by: FAMILY MEDICINE

## 2024-05-28 PROCEDURE — 97162 PT EVAL MOD COMPLEX 30 MIN: CPT | Mod: GP

## 2024-05-28 PROCEDURE — 9420000001 HC RT PATIENT EDUCATION 5 MIN

## 2024-05-28 PROCEDURE — 93306 TTE W/DOPPLER COMPLETE: CPT

## 2024-05-28 RX ORDER — ASPIRIN 81 MG/1
81 TABLET ORAL DAILY
Status: DISCONTINUED | OUTPATIENT
Start: 2024-05-28 | End: 2024-06-04 | Stop reason: HOSPADM

## 2024-05-28 RX ADMIN — CARVEDILOL 6.25 MG: 6.25 TABLET, FILM COATED ORAL at 09:50

## 2024-05-28 RX ADMIN — Medication 5 L/MIN: at 17:00

## 2024-05-28 RX ADMIN — HYDRALAZINE HYDROCHLORIDE 25 MG: 25 TABLET ORAL at 21:22

## 2024-05-28 RX ADMIN — ISOSORBIDE MONONITRATE 30 MG: 30 TABLET, EXTENDED RELEASE ORAL at 09:50

## 2024-05-28 RX ADMIN — HYDRALAZINE HYDROCHLORIDE 25 MG: 25 TABLET ORAL at 09:50

## 2024-05-28 RX ADMIN — NYSTATIN 1 APPLICATION: 100000 POWDER TOPICAL at 21:21

## 2024-05-28 RX ADMIN — LEVOTHYROXINE SODIUM 100 MCG: 0.1 TABLET ORAL at 05:04

## 2024-05-28 RX ADMIN — NYSTATIN 1 APPLICATION: 100000 POWDER TOPICAL at 09:50

## 2024-05-28 RX ADMIN — AMLODIPINE BESYLATE 10 MG: 10 TABLET ORAL at 09:50

## 2024-05-28 RX ADMIN — Medication 5 L/MIN: at 20:00

## 2024-05-28 RX ADMIN — ASPIRIN 81 MG: 81 TABLET, COATED ORAL at 21:22

## 2024-05-28 RX ADMIN — DOCUSATE SODIUM 100 MG: 100 CAPSULE, LIQUID FILLED ORAL at 09:50

## 2024-05-28 RX ADMIN — ATORVASTATIN CALCIUM 40 MG: 40 TABLET, FILM COATED ORAL at 21:22

## 2024-05-28 RX ADMIN — CARVEDILOL 6.25 MG: 6.25 TABLET, FILM COATED ORAL at 17:34

## 2024-05-28 SDOH — ECONOMIC STABILITY: INCOME INSECURITY: IN THE LAST 12 MONTHS, WAS THERE A TIME WHEN YOU WERE NOT ABLE TO PAY THE MORTGAGE OR RENT ON TIME?: NO

## 2024-05-28 SDOH — ECONOMIC STABILITY: FOOD INSECURITY: WITHIN THE PAST 12 MONTHS, THE FOOD YOU BOUGHT JUST DIDN'T LAST AND YOU DIDN'T HAVE MONEY TO GET MORE.: NEVER TRUE

## 2024-05-28 SDOH — ECONOMIC STABILITY: INCOME INSECURITY: IN THE PAST 12 MONTHS, HAS THE ELECTRIC, GAS, OIL, OR WATER COMPANY THREATENED TO SHUT OFF SERVICE IN YOUR HOME?: NO

## 2024-05-28 SDOH — SOCIAL STABILITY: SOCIAL INSECURITY
WITHIN THE LAST YEAR, HAVE TO BEEN RAPED OR FORCED TO HAVE ANY KIND OF SEXUAL ACTIVITY BY YOUR PARTNER OR EX-PARTNER?: NO

## 2024-05-28 SDOH — ECONOMIC STABILITY: FOOD INSECURITY: WITHIN THE PAST 12 MONTHS, YOU WORRIED THAT YOUR FOOD WOULD RUN OUT BEFORE YOU GOT MONEY TO BUY MORE.: NEVER TRUE

## 2024-05-28 SDOH — ECONOMIC STABILITY: INCOME INSECURITY: HOW HARD IS IT FOR YOU TO PAY FOR THE VERY BASICS LIKE FOOD, HOUSING, MEDICAL CARE, AND HEATING?: NOT HARD AT ALL

## 2024-05-28 SDOH — SOCIAL STABILITY: SOCIAL INSECURITY: WITHIN THE LAST YEAR, HAVE YOU BEEN AFRAID OF YOUR PARTNER OR EX-PARTNER?: NO

## 2024-05-28 SDOH — HEALTH STABILITY: PHYSICAL HEALTH: ON AVERAGE, HOW MANY DAYS PER WEEK DO YOU ENGAGE IN MODERATE TO STRENUOUS EXERCISE (LIKE A BRISK WALK)?: 0 DAYS

## 2024-05-28 SDOH — ECONOMIC STABILITY: HOUSING INSECURITY: IN THE LAST 12 MONTHS, HOW MANY PLACES HAVE YOU LIVED?: 1

## 2024-05-28 SDOH — HEALTH STABILITY: MENTAL HEALTH
HOW OFTEN DO YOU NEED TO HAVE SOMEONE HELP YOU WHEN YOU READ INSTRUCTIONS, PAMPHLETS, OR OTHER WRITTEN MATERIAL FROM YOUR DOCTOR OR PHARMACY?: NEVER

## 2024-05-28 SDOH — SOCIAL STABILITY: SOCIAL INSECURITY: WITHIN THE LAST YEAR, HAVE YOU BEEN HUMILIATED OR EMOTIONALLY ABUSED IN OTHER WAYS BY YOUR PARTNER OR EX-PARTNER?: NO

## 2024-05-28 SDOH — SOCIAL STABILITY: SOCIAL INSECURITY
WITHIN THE LAST YEAR, HAVE YOU BEEN KICKED, HIT, SLAPPED, OR OTHERWISE PHYSICALLY HURT BY YOUR PARTNER OR EX-PARTNER?: NO

## 2024-05-28 SDOH — HEALTH STABILITY: PHYSICAL HEALTH: ON AVERAGE, HOW MANY MINUTES DO YOU ENGAGE IN EXERCISE AT THIS LEVEL?: 0 MIN

## 2024-05-28 ASSESSMENT — COGNITIVE AND FUNCTIONAL STATUS - GENERAL
DRESSING REGULAR UPPER BODY CLOTHING: TOTAL
STANDING UP FROM CHAIR USING ARMS: TOTAL
DRESSING REGULAR LOWER BODY CLOTHING: TOTAL
EATING MEALS: A LOT
CLIMB 3 TO 5 STEPS WITH RAILING: TOTAL
TOILETING: TOTAL
MOVING TO AND FROM BED TO CHAIR: TOTAL
MOBILITY SCORE: 6
MOVING FROM LYING ON BACK TO SITTING ON SIDE OF FLAT BED WITH BEDRAILS: TOTAL
PERSONAL GROOMING: A LOT
HELP NEEDED FOR BATHING: TOTAL
DAILY ACTIVITIY SCORE: 8
WALKING IN HOSPITAL ROOM: TOTAL
TURNING FROM BACK TO SIDE WHILE IN FLAT BAD: TOTAL

## 2024-05-28 ASSESSMENT — PAIN SCALES - GENERAL
PAINLEVEL_OUTOF10: 0 - NO PAIN

## 2024-05-28 ASSESSMENT — ACTIVITIES OF DAILY LIVING (ADL)
LACK_OF_TRANSPORTATION: NO
BATHING_ASSISTANCE: TOTAL
ADL_ASSISTANCE: NEEDS ASSISTANCE

## 2024-05-28 ASSESSMENT — PAIN SCALES - WONG BAKER: WONGBAKER_NUMERICALRESPONSE: NO HURT

## 2024-05-28 ASSESSMENT — PAIN - FUNCTIONAL ASSESSMENT
PAIN_FUNCTIONAL_ASSESSMENT: FLACC (FACE, LEGS, ACTIVITY, CRY, CONSOLABILITY)
PAIN_FUNCTIONAL_ASSESSMENT: 0-10

## 2024-05-28 NOTE — PROGRESS NOTES
Speech-Language Pathology    Inpatient Speech Language Pathology Dysphagia Treatment note     Patient Name: Morena Joshi  MRN: [id number]  : 1940  Today's Date: 24  Time Calculation  Start Time: 1024  Stop Time: 1105  Time Calculation (min): 41 min       Total Number of Visits: 1/3 (, MON )    PLAN:  Skilled speech therapy for dysphagia treatment continues to be warranted to provide training and instruction regarding the use of compensatory swallow strategies, for pt/caregiver education in order to reduce risk of aspiration, dehydration and malnutrition. , to assess tolerance of diet , to determine ability to upgrade diet after PO trials with SLP     SLP Frequency: 3x per week  Discussed POC: Patient, Caregiver/family  Discussed Risks/Benefits: Yes  Patient/Caregiver Agreeable: Yes       Recommended Diet:   Solid Diet Recommendations : Regular (IDDSI Level 7), Easy to Chew   Liquid Diet Recommendations: Thin (IDDSI Level 0)  Compensatory strategies: small bites/sips, alternate bites/sips  Medication administration: Whole with liquid    Subjective:  Pt. Seen at bedside for skilled dysphagia treatment.   Pain:  Pain Assessment  Pain Assessment: 0-10  Pain Score: 0 - No pain Denies pain        Oxygen Status:   nasal cannula          Dysphagia Goals: (Established 24, projected discontinuation 2 weeks)     Pt will safely swallow recommended diet (reg, easy to chew/thin) without s/s aspiration for 90% of observed trials in order to maintain adequate nutrition and hydration.              CURRENT STATUS: 100%              PROGRESS: tolerating PO well at this level (thin, easy to chew) without overt s/s of aspiration     Pt will utilize safe swallow strategies with 75% acc in order to reduce risk of aspiration and s/s dysphagia.  CURRENT STATUS: 75% with mod assist and review              PROGRESS: Progressing, reviewed      Pt will complete oral and/or pharyngeal ROM and strengthening exercises in  order to improve swallowing skills with least restrictive diet.              CURRENT STATUS: Not attempted              PROGRESS: N/A    SLP Assessment:  Pt participated in diagnostic meal assessment with oatmeal, coffee, orange juice, banana, pancake, eggs.    OME and CNE remain sluggish but functional. Vocal quality and cough are perceptually soft with reduced amplitude.    Oral phase - Oral mucosa moist and normal in coloration. Mastication, bolus manipulation, and oral clearance timely and functional at this easy to chew level.     Pharyngeal phase - Although it is a limited assessment technique; Hyolaryngeal elevation/excursion assessed via digital palpation and appeared consistent across all trials. No coughing, choking, nor change in vocal quality present throughout trials. No overt s/s of aspiration present at the bedside.      Treatment Outcome:  SLP TX Intervention Outcome: Making Progress Towards Goals    Treatment Tolerance: Patient tolerated treatment well   Prognosis: Good               Education:  Pt. Given skilled instruction on compensatory swallowing strategies and rationale for SLP tx  Pt. gave verbal understanding, but further review is needed

## 2024-05-28 NOTE — PROGRESS NOTES
Morena Joshi is a 84 y.o. female on day 3 of admission presenting with Somnolence.    Subjective   The patient was seen and examined.  Lying in bed.  Comfortable.  More alert.  Denies any headache or dizziness.  No nausea or vomiting.       Objective     Physical Exam  HEENT:  Head externally atraumatic,  extraocular movements intact, oral mucosa moist  Neck:  Supple, no JVP, no palpable adenopathy or thyromegaly.  No carotid bruit.  Chest:  Clear to auscultation and resonant.  Heart:  Regular rate and rhythm, no murmur or gallop could be appreciated.  Abdomen:  Soft, nontender, bowel sounds present, normoactive, no palpable hepatosplenomegaly.  Extremities: Bilateral edema present, pulses present, no cyanosis or clubbing.  CNS:  Patient alert, oriented to time, place and person.    No new deficit.  Cranial nerves 2-12 grossly intact  Skin:  No active rash.  Musculoskeletal:  No  apparent joint swelling or erythema, range of movement normal.  Last Recorded Vitals  Heart Rate:  [59-83]   Temp:  [35.7 °C (96.3 °F)-36.5 °C (97.7 °F)]   Resp:  [16-26]   BP: (112-164)/(34-99)   Weight:  [90 kg (198 lb 6.4 oz)-91.5 kg (201 lb 11.5 oz)]   SpO2:  [90 %-98 %]     Intake/Output last 3 Shifts:  No intake/output data recorded.    Relevant Results  Susceptibility data from last 90 days.  Collected Specimen Info Organism Amoxicillin/Clavulanate Ampicillin Ampicillin/Sulbactam Cefazolin Cefazolin (uncomplicated UTIs only) Ciprofloxacin Gentamicin Nitrofurantoin Piperacillin/Tazobactam Trimethoprim/Sulfamethoxazole   05/25/24 Urine from Clean Catch/Voided Klebsiella pneumoniae/variicola  S  R  S  S  S  S  S  S  S  S     Results for orders placed or performed during the hospital encounter of 05/25/24 (from the past 24 hour(s))   Basic Metabolic Panel   Result Value Ref Range    Glucose 78 65 - 99 mg/dL    Sodium 146 (H) 133 - 145 mmol/L    Potassium 4.1 3.4 - 5.1 mmol/L    Chloride 102 97 - 107 mmol/L    Bicarbonate 36 (H) 24 -  31 mmol/L    Urea Nitrogen 33 (H) 8 - 25 mg/dL    Creatinine 1.10 0.40 - 1.60 mg/dL    eGFR 50 (L) >60 mL/min/1.73m*2    Calcium 9.0 8.5 - 10.4 mg/dL    Anion Gap 8 <=19 mmol/L   CBC   Result Value Ref Range    WBC 9.1 4.4 - 11.3 x10*3/uL    nRBC 0.0 0.0 - 0.0 /100 WBCs    RBC 3.99 (L) 4.00 - 5.20 x10*6/uL    Hemoglobin 10.5 (L) 12.0 - 16.0 g/dL    Hematocrit 36.3 36.0 - 46.0 %    MCV 91 80 - 100 fL    MCH 26.3 26.0 - 34.0 pg    MCHC 28.9 (L) 32.0 - 36.0 g/dL    RDW 14.1 11.5 - 14.5 %    Platelets 272 150 - 450 x10*3/uL   Transthoracic Echo (TTE) Complete   Result Value Ref Range    AV pk nieves 0.98 m/s    LVOT diam 1.90 cm    AV mn grad 2.0 mmHg    MV avg E/e' ratio 102.00     RV free wall pk S' 10.60 cm/s    RVSP 58.6 mmHg    LVIDd 5.00 cm    AV pk grad 3.8 mmHg    Aortic Valve Area by Continuity of VTI 1.49 cm2    Aortic Valve Area by Continuity of Peak Velocity 1.82 cm2    LV A4C EF 53.7    Blood Gas Arterial   Result Value Ref Range    POCT pH, Arterial 7.44 (H) 7.38 - 7.42 pH    POCT pCO2, Arterial 62 (H) 38 - 42 mm Hg    POCT pO2, Arterial 74 (L) 85 - 95 mm Hg    POCT SO2, Arterial 97 94 - 100 %    POCT Oxy Hemoglobin, Arterial 94.2 94.0 - 98.0 %    POCT Base Excess, Arterial 14.9 (H) -2.0 - 3.0 mmol/L    POCT HCO3 Calculated, Arterial 42.1 (H) 22.0 - 26.0 mmol/L    Patient Temperature 37.0 degrees Celsius    FiO2 40 %    Apparatus CANNULA     Flow 5.0 LPM    Site of Arterial Puncture Radial Left     Rg's Test Positive         Current Facility-Administered Medications:   •  acetaminophen (Tylenol) suppository 650 mg, 650 mg, rectal, q4h PRN, Alyssa Carlisle DO, 650 mg at 05/27/24 0429  •  amLODIPine (Norvasc) tablet 10 mg, 10 mg, oral, Daily, Srinivas Andino MD, 10 mg at 05/28/24 0950  •  [Held by provider] aspirin chewable tablet 81 mg, 81 mg, oral, Daily, Srinivas Andino MD  •  atorvastatin (Lipitor) tablet 40 mg, 40 mg, oral, Nightly, Srinivas Andino MD, 40 mg at 05/27/24 2049  •  bisacodyl (Dulcolax)  suppository 10 mg, 10 mg, rectal, Daily PRN, Alyssa Carlisle,   •  carvedilol (Coreg) tablet 6.25 mg, 6.25 mg, oral, BID, Srinivas Andino MD, 6.25 mg at 05/28/24 1734  •  dextrose 50 % injection 25 g, 25 g, intravenous, Once, Srinivas Andino MD  •  docusate sodium (Colace) capsule 100 mg, 100 mg, oral, Daily, Srinivas Andino MD, 100 mg at 05/28/24 0950  •  hydrALAZINE (Apresoline) injection 10 mg, 10 mg, intravenous, q6h PRN, Srinivas Andino MD  •  hydrALAZINE (Apresoline) tablet 25 mg, 25 mg, oral, TID, Srinivas Andino MD, 25 mg at 05/28/24 0950  •  ipratropium-albuteroL (Duo-Neb) 0.5-2.5 mg/3 mL nebulizer solution 3 mL, 3 mL, nebulization, q4h PRN, Srinivas Andino MD  •  isosorbide mononitrate ER (Imdur) 24 hr tablet 30 mg, 30 mg, oral, Daily, Srinivas Andino MD, 30 mg at 05/28/24 0950  •  levothyroxine (Synthroid, Levoxyl) tablet 100 mcg, 100 mcg, oral, Daily, Srinivas Andino MD, 100 mcg at 05/28/24 0504  •  nystatin (Mycostatin) 100,000 unit/gram powder 1 Application, 1 Application, Topical, BID, Srinivas Andino MD, 1 Application at 05/28/24 0950  •  oxygen (O2) therapy, , inhalation, Continuous PRN - O2/gases, Srinivas Andino MD, 5 L/min at 05/25/24 1849  •  oxygen (O2) therapy, , inhalation, Continuous - Inhalation, Rishi Casas MD, 5 L/min at 05/28/24 1700   Assessment/Plan   Principal Problem:    Somnolence  Metabolic encephalopathy  Acute hypercapnic respiratory failure  Congestive heart failure  COPD exacerbation  UTI  Acute ischemic stroke  Hypertension  Hyperlipidemia  Hypothyroidism  Carotid stenosis  Continue current medication.  Renal function is improving.  Check MRI of the brain..  Hyponatremia.  Monitor sodium level.  Supportive care.  Physical therapy and Occupational Therapy.  Will take DVT, fall, aspiration, decubitus, and DVT precaution.  This has been discussed with the patient and is agreeable to it.        Rishi Casas MD      Alternatives Discussed Intro (Do Not Add Period): I discussed alternative treatments to Mohs surgery and specifically discussed the risks and benefits of

## 2024-05-28 NOTE — PROGRESS NOTES
Occupational Therapy    Evaluation    Patient Name: Morena Joshi  MRN: [id number]  Today's Date: 5/28/2024  Time Calculation  Start Time: 0733  Stop Time: 0746  Time Calculation (min): 13 min    Assessment  IP OT Assessment  OT Assessment: Patient is an 84 year old female admitted with somnolence, small subacute stroke, and UTI. Patient is presenting below baseline level requiring total assist for ADLs and Max A for bed mobility tasks. Patient would benefit from skilled OT in order to address the above deficits and increase patient's safety and independence with daily tasks.  Prognosis: Good  Barriers to Discharge: None  Evaluation/Treatment Tolerance: Patient limited by fatigue  End of Session Communication: Bedside nurse  End of Session Patient Position: Bed, 3 rail up, Alarm on  Plan:  Treatment Interventions: ADL retraining, UE strengthening/ROM, Functional transfer training, Endurance training, Cognitive reorientation, Patient/family training, Neuromuscular reeducation, Compensatory technique education  OT Frequency: 3 times per week  OT Discharge Recommendations: Moderate intensity level of continued care  Equipment Recommended upon Discharge: Lift  OT Recommended Transfer Status: Assist of 2  OT - OK to Discharge: Yes    Subjective   Current Problem:  1. Somnolence        2. Altered mental status, unspecified altered mental status type        3. Acute UTI        4. Ischemic stroke (Multi)  Transthoracic Echo (TTE) Complete    Transthoracic Echo (TTE) Complete    Carotid duplex bilateral    Carotid duplex bilateral      5. Acute respiratory failure with hypoxia and hypercarbia (Multi)        6. Ac isch multi vasc territories stroke (Multi)  Transthoracic Echo (TTE) Complete    Transthoracic Echo (TTE) Complete    Carotid duplex bilateral    Carotid duplex bilateral        General:  General  Reason for Referral: decline in ADLs  Referred By: Dr. Andino  Past Medical History Relevant to Rehab: COPD, HTN, HLD,  Hypothyroidism  Family/Caregiver Present: No  Prior to Session Communication: Bedside nurse  Patient Position Received: Bed, 3 rail up, Alarm on  Preferred Learning Style: verbal  General Comment: Patient is an 84 year old female who presented from SNF with c/o AMS. CT of the head indicating: small subacute stroke. Patient admitted with somnolence, UTI. She is cleared by nursing for therapy. Patient in bed upon arrival and agreeable to participate  Precautions:  Medical Precautions: Fall precautions, Oxygen therapy device and L/min (5L O2 via NC)  Pain:  Pain Assessment  Pain Assessment: 0-10  Pain Score: 0 - No pain    Objective   Cognition:  Overall Cognitive Status: Impaired at baseline (confused, able to follow some simple motor commands)  Orientation Level: Disoriented to place, Disoriented to time, Disoriented to situation  Safety/Judgement: Exceptions to WFL  Insight: Severe  Impulsive: Mildly  Processing Speed: Delayed     Home Living:  Type of Home: Skilled Nursing facility ([facility])  Lives With: Other (Comment) (care staff)  Home Adaptive Equipment: Wheelchair-manual  Home Layout: One level  Home Access: Level entry  Home Living Comments: patient presents from [facility]   Prior Function:  Level of [place]: Needs assistance with ADLs, Needs assistance with homemaking  Receives Help From: Other (Comment) (care staff)  ADL Assistance: Needs assistance  Homemaking Assistance: Needs assistance  Ambulatory Assistance: Needs assistance  Transfers: Total ([place])  Prior Function Comments: patient requires assist for ADLs/IADLs at [place]. [place] for all txfers  IADL History:  Homemaking Responsibilities: No  IADL Comments: care staff complete  ADL:  Eating Assistance: Minimal  Eating Deficit: Setup, Supervision/safety, Bringing food to mouth assist (per clinical judgement)  Grooming Assistance: Maximal  Grooming Deficit: Setup, Supervision/safety (per clinical judgement, seated)  Bathing  Assistance: Total  Bathing Deficit: Setup, Steadying, Verbal cueing, Supervision/safety, Increased time to complete , Chest, Right arm, Left arm, Abdomen, Panniculus, Perineal area, Buttocks, Right upper leg, Right lower leg including foot, Left upper leg, Left lower leg including foot (per clinical judgement)  UE Dressing Assistance: Total  UE Dressing Deficit: Thread RUE, Thread LUE, Pull over head, Pull around back, Pull down in back (per clinical judgement)  LE Dressing Assistance: Total  LE Dressing Deficit: Don/doff R sock, Don/doff L sock  Toileting Assistance with Device: Total  Toileting Deficit: Incontinent  Activity Tolerance:  Endurance: Decreased tolerance for upright activites  Activity Tolerance Comments: fair-  Bed Mobility/Transfers: Bed Mobility  Bed Mobility: Yes  Bed Mobility 1  Bed Mobility 1: Supine to sitting  Level of Assistance 1: Maximum assistance  Bed Mobility Comments 1: assist for trunk up and to move B LE  Bed Mobility 2  Bed Mobility  2: Sitting to supine  Level of Assistance 2: Maximum assistance  Bed Mobility Comments 2: assist to raise B LE and support trunk  Bed Mobility 3  Bed Mobility 3: Scooting  Level of Assistance 3: Dependent, +2  Bed Mobility Comments 3: with use of green draw sheet to boost patient towards the head of the bed    Transfers  Transfer: No (not safe to attempt this date)    Functional Mobility:  Functional Mobility  Functional Mobility Performed: No (patient does not complete at baseline)  Sitting Balance:  Static Sitting Balance  Static Sitting-Balance Support: Feet supported, Bilateral upper extremity supported  Static Sitting-Level of Assistance: Contact guard    IADL's:   Homemaking Responsibilities: No  IADL Comments: care staff complete    Sensation:  Light Touch: No apparent deficits  Sensation Comment: patient denies numbness/tingling  Strength:  Strength Comments: B UE 3-/5 grossly  Coordination:  Movements are Fluid and Coordinated: No  Coordination  Comment: decreased overall     Extremities: RUE   RUE : Exceptions to WFL (decreaed shoulder) and LUE   LUE: Exceptions to WFL (decreaed shoulder)    Outcome Measures: [facility] Daily Activity  Putting on and taking off regular lower body clothing: Total  Bathing (including washing, rinsing, drying): Total  Putting on and taking off regular upper body clothing: Total  Toileting, which includes using toilet, bedpan or urinal: Total  Taking care of personal grooming such as brushing teeth: A lot  Eating Meals: A lot  Daily Activity - Total Score: 8    Education Documentation  Body Mechanics, taught by Imelda Siddiqi OT at 5/28/2024  9:40 AM.  Learner: Patient  Readiness: Acceptance  Method: Explanation, Demonstration  Response: Needs Reinforcement    Precautions, taught by Imelda Siddiqi OT at 5/28/2024  9:40 AM.  Learner: Patient  Readiness: Acceptance  Method: Explanation, Demonstration  Response: Needs Reinforcement    Education Comments  No comments found.      Goals:   Encounter Problems       Encounter Problems (Active)       OT Goals       UB ADLs (Progressing)       Start:  05/28/24    Expected End:  06/20/24       Patient will complete UB ADLs with Close Supervision, using AE as needed, in order to increase safety and independence with self-care tasks.         LB ADLs (Progressing)       Start:  05/28/24    Expected End:  06/20/24       Patient will complete LB ADLs with Close Supervision, using AE as needed, in order to increase safety and independence with self-care tasks.           B UE Strengthening (Progressing)       Start:  05/28/24    Expected End:  06/20/24       Patient will increase B UE strength to 3+/5 for functional transfers.         Functional Transfers (Progressing)       Start:  05/28/24    Expected End:  06/20/24       Patient will complete bed mobility tasks with Min A in order to increase patient's safety and independence with daily tasks.         Sitting Balance (Progressing)       Start:   05/28/24    Expected End:  06/20/24       Patient will demonstrate the ability to sit EOB at least >/= 25 minutes with Fair+ balance for increased safety and independence with daily tasks.

## 2024-05-28 NOTE — PROGRESS NOTES
05/28/24 1102   Discharge Planning   Living Arrangements Other (Comment)  (Patient is a resident of [facility])   Support Systems Children   Assistance Needed Patient is dependent for ADLs and IADLs   Type of Residence Nursing home/residential care   Do you have animals or pets at home? No   Who is requesting discharge planning? Provider   Home or Post Acute Services Post acute facilities (Rehab/SNF/etc)   Type of Post Acute Facility Services Long term care   Patient expects to be discharged to: [facility]   Does the patient need discharge transport arranged? Yes   RoundTrip coordination needed? Yes   Has discharge transport been arranged? No   Financial Resource Strain   How hard is it for you to pay for the very basics like food, housing, medical care, and heating? Not hard   Housing Stability   In the last 12 months, was there a time when you were not able to pay the mortgage or rent on time? N   In the last 12 months, how many places have you lived? 1   In the last 12 months, was there a time when you did not have a steady place to sleep or slept in a shelter (including now)? N   Transportation Needs   In the past 12 months, has lack of transportation kept you from medical appointments or from getting medications? no   In the past 12 months, has lack of transportation kept you from meetings, work, or from getting things needed for daily living? No   Patient Choice   Provider Choice list and CMS website (https://medicare.gov/care-compare#search) for post-acute Quality and Resource Measure Data were provided and reviewed with: Family   Patient / Family choosing to utilize agency / facility established prior to hospitalization Yes     Patient unable to provide appropriate answers; called daughter, Rajwinder. She confirmed patient is a resident of [facility]. They provide all patient care and she denies any discharge needs or concerns. Plan is for patient to return to [facility] upon discharge; updates sent to  them at this time.

## 2024-05-28 NOTE — CARE PLAN
The patient's goals for the shift include UTD    The clinical goals for the shift include Monitor oxygenation status    Over the shift, the patient did not make progress toward the following goals. Barriers to progression include . Recommendations to address these barriers include   Problem: Pain - Adult  Goal: Verbalizes/displays adequate comfort level or baseline comfort level  Outcome: Progressing     Problem: Discharge Planning  Goal: Discharge to home or other facility with appropriate resources  Outcome: Progressing     Problem: Chronic Conditions and Co-morbidities  Goal: Patient's chronic conditions and co-morbidity symptoms are monitored and maintained or improved  Outcome: Progressing     Problem: Skin  Goal: Promote/optimize nutrition  Outcome: Progressing     Problem: General Stroke  Goal: Establish a mutual long term goal with patient by discharge  Outcome: Progressing  Goal: Demonstrate improvement in neurological exam throughout the shift  Outcome: Progressing  Goal: Participate in treatment (ie., meds, therapy) throughout shift  Outcome: Progressing  Goal: Out of bed three times today  Outcome: Progressing     Problem: Respiratory  Goal: No signs of respiratory distress (eg. Use of accessory muscles. Peds grunting)  Outcome: Progressing   .

## 2024-05-28 NOTE — PROGRESS NOTES
Physical Therapy    Physical Therapy Evaluation & Treatment    Patient Name: Morena Joshi  MRN: [id number]  Today's Date: 5/28/2024   Time Calculation  Start Time: 0929  Stop Time: 1005  Time Calculation (min): 36 min    Assessment/Plan   PT Assessment  PT Assessment Results: Decreased strength, Decreased range of motion, Decreased endurance, Impaired balance, Decreased mobility, Decreased coordination, Decreased cognition, Decreased safety awareness, Impaired judgement, Pain  Rehab Prognosis: Fair  Evaluation/Treatment Tolerance: Patient limited by fatigue, Patient limited by pain  Medical Staff Made Aware: Yes  Strengths: Support of Caregivers  Barriers to Participation: Comorbidities  End of Session Communication: Bedside nurse  Assessment Comment: Pt cooperative as able, weak, fearful of falling, decreased cognition, required max assist of 2 for limited mobility. Would benefit from skilled PT services to progress functional mobility as able  End of Session Patient Position: Bed, 4 rail up, Alarm on   IP OR SWING BED PT PLAN  Inpatient or Swing Bed: Inpatient  PT Plan  Treatment/Interventions: Bed mobility, Transfer training, Balance training, Strengthening, Endurance training, Range of motion, Therapeutic exercise, Therapeutic activity  PT Plan: Skilled PT  PT Frequency: 3 times per week  PT Discharge Recommendations: Moderate intensity level of continued care  Equipment Recommended upon Discharge: Lift  PT Recommended Transfer Status: Assist x2  PT - OK to Discharge: Yes      Subjective     General Visit Information:  General  Reason for Referral: impaired mobility  Referred By: Dr. Andino  Past Medical History Relevant to Rehab: COPD, HTN, HLD, Hypothyroidism  Family/Caregiver Present: No  Prior to Session Communication: Bedside nurse  Patient Position Received: Bed, 3 rail up, Alarm on  Preferred Learning Style: verbal  General Comment: Pt is an 84 y.o. female adm for somnolence; found to be acute resp  failure, required BiPAP, also w/ suspected UTI, CT showing small, subacute infarct. Pt came from [facility], reports requiring [place] lift for transfers. Pt was cleared for PT eval, pt agreeable, c/o being cold.  Home Living:  Home Living  Type of Home: Skilled Nursing facility ([facility] (skilled?))  Lives With:  (care staff)  Home Adaptive Equipment: Wheelchair-manual  Home Layout: One level  Home Access: Level entry  Home Living Comments: assume pt at [facility] > 1 month  Prior Level of Function:  Prior Function Per Pt/Caregiver Report  Level of [place]: Needs assistance with ADLs, Needs assistance with homemaking, Needs assistance with functional transfers  Receives Help From:  (care staff)  Ambulatory Assistance:  (pt reports is not ambulatory)  Transfers: Total ([place])  Prior Function Comments: patient requires assist for ADLs/IADLs at [facility]. [place] for all txfers  Precautions:  Precautions  Medical Precautions: Fall precautions, Oxygen therapy device and L/min (5L O2)  Precautions Comment: Pt fearful of falling w/ mobility  Vital Signs:  Vital Signs  Heart Rate: 79  Heart Rate Source: Monitor  Resp: 19  SpO2: 95 %  BP: (!) 164/91  MAP (mmHg): 113  BP Location: Right arm  BP Method: Automatic  Patient Position: Sitting (EOB for BP, other vitals at rest in supine)    Objective   Pain:  Pain Assessment  Pain Assessment: 0-10  Pain Score: 0 - No pain (None at rest, FLACC 3/10 RT knee, legs w/ activity)  Cognition:  Cognition  Overall Cognitive Status: Impaired at baseline  Arousal/Alertness:  (sleepy)  Orientation Level: Disoriented to place, Disoriented to situation (knew month only)  Cognition Comments: limited info per pt, unable to provide full details of PLOF  Safety/Judgement:  (decreased awareness and response to assisting self)  Insight: Severe  Processing Speed: Delayed    General Assessments:  Activity Tolerance  Endurance: Decreased tolerance for upright  activites  Activity Tolerance Comments: Fair- (easily fatigued, c/o neck bothering her in sitting EOB after 5 minutes)    Sensation  Sensation Comment: patient denies numbness/tingling    Strength  Strength Comments: BLEs grossly 2 to 2+/5 through observation sitting EOB  Coordination  Movements are Fluid and Coordinated: No  Coordination Comment: pt weak, limited AROM initiated    Postural Control  Posture Comment: rounded posture sitting EOB w/ slight lean Rt    Static Sitting Balance  Static Sitting-Balance Support: No upper extremity supported (feet slightly touching floor, limited active UE supports used)  Static Sitting-Level of Assistance: Contact guard  Static Sitting-Comment/Number of Minutes: x 5 minutes or so, pt actively performing knee extension AROM bilat  Functional Assessments:  Bed Mobility 1  Bed Mobility 1: Supine to sitting, Sitting to supine, Rolling left  Level of Assistance 1: Maximum assistance, +2, Moderate verbal cues  Bed Mobility Comments 1: to Rt EOB w/ HOB elevated part way, use of rail, assist for trunk up and LEs over EOB; assist for trunk down and LEs into bed. Pt fearful of falling. Rolling L & R to manage linens, dependent boost to HOB.    Transfers  Transfer: No    Treatments:  Bed Mobility 1  Bed Mobility 1: Supine to sitting, Sitting to supine, Rolling left  Level of Assistance 1: Maximum assistance, +2, Moderate verbal cues  Bed Mobility Comments 1: to Rt EOB w/ HOB elevated part way, use of rail, assist for trunk up and LEs over EOB; assist for trunk down and LEs into bed. Pt fearful of falling. Rolling L & R to manage linens, dependent boost to HOB. Pt sat EOB 5-7 minutes, able to perform active LE ROM sitting EOB, take meds w/ nsg while sitting upright.   Outcome Measures:  [facility] Basic Mobility  Turning from your back to your side while in a flat bed without using bedrails: Total  Moving from lying on your back to sitting on the side of a flat bed without using bedrails:  Total  Moving to and from bed to chair (including a wheelchair): Total  Standing up from a chair using your arms (e.g. wheelchair or bedside chair): Total  To walk in hospital room: Total  Climbing 3-5 steps with railing: Total  Basic Mobility - Total Score: 6    Encounter Problems       Encounter Problems (Active)       Mobility       Pt will participate w/ LE exercises to promote functional strength and mobility (Progressing)       Start:  05/28/24    Expected End:  06/07/24            Pt will tolerate sitting EOB x 20 minutes for functional endurance (Progressing)       Start:  05/28/24    Expected End:  06/07/24               PT Transfers       STG - Patient to transfer to and from sit to supine w/ mod assist of 2 (Progressing)       Start:  05/28/24    Expected End:  06/07/24            STG - Patient will transfer sit to and from stand w/ max assist of 2 tolerating 30 seconds or more (Progressing)       Start:  05/28/24    Expected End:  06/07/24                   Education Documentation  Mobility Training, taught by Jessica Snow, PT at 5/28/2024 11:04 AM.  Learner: Patient  Readiness: Acceptance  Method: Explanation  Response: Needs Reinforcement  Comment: safety and technique    Education Comments  No comments found.

## 2024-05-28 NOTE — CARE PLAN
The patient's goals for the shift include UTD    The clinical goals for the shift include Monitor oxygenation status    Over the shift, the patient did not make progress toward the following goals. Barriers to progression include . Recommendations to address these barriers include .

## 2024-05-29 LAB
ANION GAP SERPL CALC-SCNC: 5 MMOL/L
BASOPHILS # BLD AUTO: 0.05 X10*3/UL (ref 0–0.1)
BASOPHILS NFR BLD AUTO: 0.5 %
BUN SERPL-MCNC: 28 MG/DL (ref 8–25)
CALCIUM SERPL-MCNC: 9.1 MG/DL (ref 8.5–10.4)
CHLORIDE SERPL-SCNC: 103 MMOL/L (ref 97–107)
CO2 SERPL-SCNC: 38 MMOL/L (ref 24–31)
CREAT SERPL-MCNC: 1.1 MG/DL (ref 0.4–1.6)
EGFRCR SERPLBLD CKD-EPI 2021: 50 ML/MIN/1.73M*2
EOSINOPHIL # BLD AUTO: 0.41 X10*3/UL (ref 0–0.4)
EOSINOPHIL NFR BLD AUTO: 4.3 %
ERYTHROCYTE [DISTWIDTH] IN BLOOD BY AUTOMATED COUNT: 14 % (ref 11.5–14.5)
GLUCOSE SERPL-MCNC: 82 MG/DL (ref 65–99)
HCT VFR BLD AUTO: 35 % (ref 36–46)
HGB BLD-MCNC: 10 G/DL (ref 12–16)
IMM GRANULOCYTES # BLD AUTO: 0.05 X10*3/UL (ref 0–0.5)
IMM GRANULOCYTES NFR BLD AUTO: 0.5 % (ref 0–0.9)
LYMPHOCYTES # BLD AUTO: 2.44 X10*3/UL (ref 0.8–3)
LYMPHOCYTES NFR BLD AUTO: 25.4 %
MCH RBC QN AUTO: 26.2 PG (ref 26–34)
MCHC RBC AUTO-ENTMCNC: 28.6 G/DL (ref 32–36)
MCV RBC AUTO: 92 FL (ref 80–100)
MONOCYTES # BLD AUTO: 1.36 X10*3/UL (ref 0.05–0.8)
MONOCYTES NFR BLD AUTO: 14.2 %
NEUTROPHILS # BLD AUTO: 5.3 X10*3/UL (ref 1.6–5.5)
NEUTROPHILS NFR BLD AUTO: 55.1 %
NRBC BLD-RTO: 0 /100 WBCS (ref 0–0)
PLATELET # BLD AUTO: 272 X10*3/UL (ref 150–450)
POTASSIUM SERPL-SCNC: 4.4 MMOL/L (ref 3.4–5.1)
RBC # BLD AUTO: 3.81 X10*6/UL (ref 4–5.2)
SODIUM SERPL-SCNC: 146 MMOL/L (ref 133–145)
WBC # BLD AUTO: 9.6 X10*3/UL (ref 4.4–11.3)

## 2024-05-29 PROCEDURE — 85025 COMPLETE CBC W/AUTO DIFF WBC: CPT | Performed by: INTERNAL MEDICINE

## 2024-05-29 PROCEDURE — 80048 BASIC METABOLIC PNL TOTAL CA: CPT | Performed by: INTERNAL MEDICINE

## 2024-05-29 PROCEDURE — 36415 COLL VENOUS BLD VENIPUNCTURE: CPT | Performed by: INTERNAL MEDICINE

## 2024-05-29 PROCEDURE — 2500000005 HC RX 250 GENERAL PHARMACY W/O HCPCS: Performed by: INTERNAL MEDICINE

## 2024-05-29 PROCEDURE — 92526 ORAL FUNCTION THERAPY: CPT | Mod: GN | Performed by: SPEECH-LANGUAGE PATHOLOGIST

## 2024-05-29 PROCEDURE — 9420000001 HC RT PATIENT EDUCATION 5 MIN

## 2024-05-29 PROCEDURE — 2500000001 HC RX 250 WO HCPCS SELF ADMINISTERED DRUGS (ALT 637 FOR MEDICARE OP): Performed by: INTERNAL MEDICINE

## 2024-05-29 PROCEDURE — 2060000001 HC INTERMEDIATE ICU ROOM DAILY

## 2024-05-29 PROCEDURE — 2500000001 HC RX 250 WO HCPCS SELF ADMINISTERED DRUGS (ALT 637 FOR MEDICARE OP): Performed by: FAMILY MEDICINE

## 2024-05-29 RX ADMIN — Medication 4 L/MIN: at 08:00

## 2024-05-29 RX ADMIN — DOCUSATE SODIUM 100 MG: 100 CAPSULE, LIQUID FILLED ORAL at 08:43

## 2024-05-29 RX ADMIN — NYSTATIN 1 APPLICATION: 100000 POWDER TOPICAL at 22:34

## 2024-05-29 RX ADMIN — CARVEDILOL 6.25 MG: 6.25 TABLET, FILM COATED ORAL at 08:43

## 2024-05-29 RX ADMIN — ASPIRIN 81 MG: 81 TABLET, COATED ORAL at 08:43

## 2024-05-29 RX ADMIN — Medication 4 L/MIN: at 20:00

## 2024-05-29 RX ADMIN — AMLODIPINE BESYLATE 10 MG: 10 TABLET ORAL at 08:43

## 2024-05-29 RX ADMIN — HYDRALAZINE HYDROCHLORIDE 25 MG: 25 TABLET ORAL at 22:27

## 2024-05-29 RX ADMIN — CARVEDILOL 6.25 MG: 6.25 TABLET, FILM COATED ORAL at 16:44

## 2024-05-29 RX ADMIN — HYDRALAZINE HYDROCHLORIDE 25 MG: 25 TABLET ORAL at 15:54

## 2024-05-29 RX ADMIN — LEVOTHYROXINE SODIUM 100 MCG: 0.1 TABLET ORAL at 06:40

## 2024-05-29 RX ADMIN — ATORVASTATIN CALCIUM 40 MG: 40 TABLET, FILM COATED ORAL at 22:27

## 2024-05-29 RX ADMIN — ISOSORBIDE MONONITRATE 30 MG: 30 TABLET, EXTENDED RELEASE ORAL at 08:43

## 2024-05-29 RX ADMIN — HYDRALAZINE HYDROCHLORIDE 25 MG: 25 TABLET ORAL at 08:43

## 2024-05-29 RX ADMIN — NYSTATIN 1 APPLICATION: 100000 POWDER TOPICAL at 08:45

## 2024-05-29 ASSESSMENT — PAIN - FUNCTIONAL ASSESSMENT: PAIN_FUNCTIONAL_ASSESSMENT: 0-10

## 2024-05-29 ASSESSMENT — PAIN SCALES - GENERAL
PAINLEVEL_OUTOF10: 0 - NO PAIN

## 2024-05-29 NOTE — PROGRESS NOTES
Pulmonary Progress Note    Morena Joshi is a 84 y.o. female on day 3 of admission presenting with Somnolence.    Subjective   No acute events  On nasal canula  Objective   Vital Signs      5/28/2024    10:44 AM 5/28/2024    11:00 AM 5/28/2024    11:25 AM 5/28/2024    12:00 PM 5/28/2024     4:09 PM 5/28/2024     5:34 PM 5/28/2024     7:15 PM   Vitals   Systolic   116  118 126 118   Diastolic   34  99 54 64   Heart Rate  69 59 65 69 83 64   Temp   36.3 °C (97.3 °F)  36.1 °C (97 °F)  36.4 °C (97.5 °F)   Resp  18 16 17 17  16   Weight (lb) 201.72         BMI 40.72 kg/m2         BSA (m2) 1.95 m2             Oxygen Therapy  SpO2: 96 %  Medical Gas Therapy: Supplemental oxygen  O2 Delivery Method: Nasal cannula    FiO2 (%):  [40 %] 40 %  S RR:  [16] 16    Intake/Output previous 24 hours:    Intake/Output Summary (Last 24 hours) at 5/28/2024 2335  Last data filed at 5/28/2024 1044  Gross per 24 hour   Intake 175 ml   Output --   Net 175 ml       Physical Exam  ...  Lines and Tubes:  Peripheral IV 05/25/24 20 G Left Antecubital (Active)   Placement Date/Time: 05/25/24 1244   Size (Gauge): 20 G  Orientation: Left  Location: Antecubital  Site Prep: Chlorhexidine    Number of days: 3       External Urinary Catheter Female (Active)   Placement Date/Time: 05/25/24 1645   Hand Hygiene Completed: Yes  External Catheter Type: Female   Number of days: 3         Scheduled medications  amLODIPine, 10 mg, oral, Daily  aspirin, 81 mg, oral, Daily  atorvastatin, 40 mg, oral, Nightly  carvedilol, 6.25 mg, oral, BID  dextrose, 25 g, intravenous, Once  docusate sodium, 100 mg, oral, Daily  hydrALAZINE, 25 mg, oral, TID  isosorbide mononitrate ER, 30 mg, oral, Daily  levothyroxine, 100 mcg, oral, Daily  nystatin, 1 Application, Topical, BID  oxygen, , inhalation, Continuous - Inhalation      Continuous medications     PRN medications  PRN medications: acetaminophen, bisacodyl, hydrALAZINE, ipratropium-albuteroL, oxygen    Relevant  Results  Results from last 7 days   Lab Units 05/28/24  0529 05/27/24  0536 05/25/24  1247   WBC AUTO x10*3/uL 9.1 8.5 9.4   HEMOGLOBIN g/dL 10.5* 10.9* 10.2*   HEMATOCRIT % 36.3 37.9 36.9   PLATELETS AUTO x10*3/uL 272 279 290      Results from last 7 days   Lab Units 05/28/24  0529 05/27/24  0536 05/26/24  0547   SODIUM mmol/L 146* 145 144   POTASSIUM mmol/L 4.1 4.6 4.9   CHLORIDE mmol/L 102 100 100   CO2 mmol/L 36* 32* 33*   BUN mg/dL 33* 42* 43*   CREATININE mg/dL 1.10 1.40 1.80*   GLUCOSE mg/dL 78 134* 101*   CALCIUM mg/dL 9.0 9.1 8.8      Results from last 7 days   Lab Units 05/28/24  1036   POCT PH, ARTERIAL pH 7.44*   POCT PCO2, ARTERIAL mm Hg 62*   POCT PO2, ARTERIAL mm Hg 74*   POCT HCO3 CALCULATED, ARTERIAL mmol/L 42.1*   POCT BASE EXCESS, ARTERIAL mmol/L 14.9*     XR chest 1 view 05/25/2024    Narrative  Interpreted By:  Jose Miguel Smith,  STUDY:  XR CHEST 1 VIEW;  5/25/2024 1:14 pm    INDICATION:  Signs/Symptoms:confusion.    COMPARISON:  03/09/2023    ACCESSION NUMBER(S):  [id number]    ORDERING CLINICIAN:  JHONATAN PADILLA    FINDINGS:  Scattered bilateral airspace opacities. Pleural effusions not  excluded. Cardiomegaly suspected. Aortic atherosclerosis.    Impression  Scattered bilateral infiltrates or edema.    MACRO:  None    Signed by: Jose Miguel Smith 5/25/2024 1:17 PM  Dictation workstation:   [id number]      Patient Active Problem List   Diagnosis    Somnolence     Assessment/Plan   Acute on probably chronic respiratory failure with hypoxia and hypercarbia  Metabolic encephalopathy: Stroke versus urinary tract infection versus hypercarbia.    Wheezing/acute bronchospasm:   Urinary tract infection  Continue with supplemental oxygen  BiPAP as needed  Continue with bronchodilators  Antibiotics  Stable for SDU    Bhupendra Carmichael MD  [facility]

## 2024-05-29 NOTE — CARE PLAN
The patient's goals for the shift include UTD    The clinical goals for the shift include Pt will remain free from falls for entire shift.      Problem: Discharge Planning  Goal: Discharge to home or other facility with appropriate resources  Outcome: Progressing  Flowsheets (Taken 5/29/2024 1953)  Discharge to home or other facility with appropriate resources:   Identify barriers to discharge with patient and caregiver   Arrange for needed discharge resources and transportation as appropriate   Identify discharge learning needs (meds, wound care, etc)   Arrange for interpreters to assist at discharge as needed   Refer to discharge planning if patient needs post-hospital services based on physician order or complex needs related to functional status, cognitive ability or social support system     Problem: Chronic Conditions and Co-morbidities  Goal: Patient's chronic conditions and co-morbidity symptoms are monitored and maintained or improved  Outcome: Progressing  Flowsheets (Taken 5/29/2024 1953)  Care Plan - Patient's Chronic Conditions and Co-Morbidity Symptoms are Monitored and Maintained or Improved:   Monitor and assess patient's chronic conditions and comorbid symptoms for stability, deterioration, or improvement   Update acute care plan with appropriate goals if chronic or comorbid symptoms are exacerbated and prevent overall improvement and discharge   Collaborate with multidisciplinary team to address chronic and comorbid conditions and prevent exacerbation or deterioration     Problem: Skin  Goal: Promote/optimize nutrition  Outcome: Progressing  Flowsheets (Taken 5/29/2024 1953)  Promote/optimize nutrition:   Assist with feeding   Monitor/record intake including meals   Consume > 50% meals/supplements   Offer water/supplements/favorite foods   Discuss with provider if NPO > 2 days   Reassess MST if dietician not consulted     Problem: General Stroke  Goal: Establish a mutual long term goal with patient  by discharge  Outcome: Progressing  Flowsheets (Taken 5/29/2024 1953)  Establish a mutual long term goal with patient by discharge:   Attend medical follow-up appointments   Monitor blood pressure   Avoid recreational drug use   Monitor blood glucose   Eat healthy   Quit smoking   Engage in physical activity  Goal: Demonstrate improvement in neurological exam throughout the shift  Outcome: Progressing  Goal: Participate in treatment (ie., meds, therapy) throughout shift  Outcome: Progressing  Goal: Out of bed three times today  Outcome: Progressing     Problem: Respiratory  Goal: No signs of respiratory distress (eg. Use of accessory muscles. Peds grunting)  Outcome: Progressing  Flowsheets (Taken 5/29/2024 1953)  No signs of respiratory distress (eg. Use of accessory muscles. Peds grunting: Monitor maternal/fetal well-being

## 2024-05-29 NOTE — PROGRESS NOTES
Speech-Language Pathology    Inpatient Speech Language Pathology Dysphagia Treatment note     Patient Name: Morena Joshi  MRN: [id number]  : 1940  Today's Date: 24  Time Calculation  Start Time: 1315  Stop Time: 1335  Time Calculation (min): 20 min       Total Number of Visits: 2/3 (, MON )    PLAN:  Skilled speech therapy for dysphagia treatment is to be discontinued as pt has returned to functional baseline.   SLP TX Plan: Discharge from Speech Therapy  SLP Frequency: PRN until discharge  Discussed POC: Patient, Nursing  Discussed Risks/Benefits: Yes, Patient, Nursing  Patient/Caregiver Agreeable: Yes  SLP - OK to Discharge: Yes    Recommended Diet:   Solid Diet Recommendations : Regular (IDDSI Level 7), Easy to Chew   Liquid Diet Recommendations: Thin (IDDSI Level 0)  Compensatory strategies: small bites/sips, alternate bites/sips  Medication administration: Whole with liquid    Easy to chew solids remains pt's least restrictive diet given her dentition status.     Subjective:  Pt. Seen at bedside for skilled dysphagia treatment.   Pain:  Pain Assessment  Pain Assessment: 0-10  Pain Score: 0 - No pain Denies pain        Oxygen Status:   nasal cannula          Dysphagia Goals: (Established 24, projected discontinuation 2 weeks)     Pt will safely swallow recommended diet (reg, easy to chew/thin) without s/s aspiration for 90% of observed trials in order to maintain adequate nutrition and hydration.              CURRENT STATUS: 100%              PROGRESS: Continues tolerating PO well at this level (thin, easy to chew) without overt s/s of aspiration - GOAL MET     Pt will utilize safe swallow strategies with 75% acc in order to reduce risk of aspiration and s/s dysphagia.  CURRENT STATUS: 75% (I)              PROGRESS: GOAL MET     Pt will complete oral and/or pharyngeal ROM and strengthening exercises in order to improve swallowing skills with least restrictive diet.              CURRENT  STATUS: Not indicated, ROM & strength WFL              PROGRESS: GOAL MET    SLP Assessment:  Pt participated in diagnostic meal assessment with peaches and pudding and iced tea    OME and CNE and overall mentation are much improved today. Vocal quality and cough are perceptually functional and adequate.     Oral phase - Oral mucosa moist and normal in coloration. Mastication, bolus manipulation, and oral clearance timely and functional at this easy to chew level.     Pharyngeal phase - Although it is a limited assessment technique; Hyolaryngeal elevation/excursion assessed via digital palpation and appeared consistent across all trials. No coughing, choking, nor change in vocal quality present throughout trials. No overt s/s of aspiration present at the bedside.      Treatment Outcome:  SLP TX Intervention Outcome: Making Progress Towards Goals    Treatment Tolerance: Patient tolerated treatment well   Prognosis: Good           Education:  Pt. Given skilled instruction on compensatory swallowing strategies, current diet level, and discharge from speech therapy.   Pt. gave verbal understanding,

## 2024-05-29 NOTE — CARE PLAN
The patient's goals for the shift include UTD    The clinical goals for the shift include safety    Problem: Skin  Goal: Promote/optimize nutrition  Flowsheets (Taken 5/29/2024 0607)  Promote/optimize nutrition:   Assist with feeding   Consume > 50% meals/supplements   Monitor/record intake including meals

## 2024-05-29 NOTE — PROGRESS NOTES
05/29/24 1013   Discharge Planning   Patient expects to be discharged to: [facility]   Does the patient need discharge transport arranged? Yes   RoundTrip coordination needed? Yes   Has discharge transport been arranged? No

## 2024-05-30 PROCEDURE — 97535 SELF CARE MNGMENT TRAINING: CPT | Mod: GO

## 2024-05-30 PROCEDURE — 97110 THERAPEUTIC EXERCISES: CPT | Mod: GP

## 2024-05-30 PROCEDURE — 2500000001 HC RX 250 WO HCPCS SELF ADMINISTERED DRUGS (ALT 637 FOR MEDICARE OP): Performed by: INTERNAL MEDICINE

## 2024-05-30 PROCEDURE — 97530 THERAPEUTIC ACTIVITIES: CPT | Mod: GO

## 2024-05-30 PROCEDURE — 2060000001 HC INTERMEDIATE ICU ROOM DAILY

## 2024-05-30 PROCEDURE — 97530 THERAPEUTIC ACTIVITIES: CPT | Mod: GP

## 2024-05-30 PROCEDURE — 2500000005 HC RX 250 GENERAL PHARMACY W/O HCPCS: Performed by: INTERNAL MEDICINE

## 2024-05-30 PROCEDURE — 2500000001 HC RX 250 WO HCPCS SELF ADMINISTERED DRUGS (ALT 637 FOR MEDICARE OP): Performed by: FAMILY MEDICINE

## 2024-05-30 PROCEDURE — 9420000001 HC RT PATIENT EDUCATION 5 MIN

## 2024-05-30 RX ADMIN — Medication 4 L/MIN: at 20:00

## 2024-05-30 RX ADMIN — LEVOTHYROXINE SODIUM 100 MCG: 0.1 TABLET ORAL at 05:36

## 2024-05-30 RX ADMIN — AMLODIPINE BESYLATE 10 MG: 10 TABLET ORAL at 08:34

## 2024-05-30 RX ADMIN — HYDRALAZINE HYDROCHLORIDE 25 MG: 25 TABLET ORAL at 08:34

## 2024-05-30 RX ADMIN — CARVEDILOL 6.25 MG: 6.25 TABLET, FILM COATED ORAL at 08:34

## 2024-05-30 RX ADMIN — NYSTATIN 1 APPLICATION: 100000 POWDER TOPICAL at 08:34

## 2024-05-30 RX ADMIN — Medication 4 L/MIN: at 08:00

## 2024-05-30 RX ADMIN — DOCUSATE SODIUM 100 MG: 100 CAPSULE, LIQUID FILLED ORAL at 08:34

## 2024-05-30 RX ADMIN — NYSTATIN 1 APPLICATION: 100000 POWDER TOPICAL at 21:06

## 2024-05-30 RX ADMIN — ISOSORBIDE MONONITRATE 30 MG: 30 TABLET, EXTENDED RELEASE ORAL at 08:34

## 2024-05-30 RX ADMIN — ATORVASTATIN CALCIUM 40 MG: 40 TABLET, FILM COATED ORAL at 21:06

## 2024-05-30 RX ADMIN — ASPIRIN 81 MG: 81 TABLET, COATED ORAL at 08:34

## 2024-05-30 ASSESSMENT — PAIN SCALES - PAIN ASSESSMENT IN ADVANCED DEMENTIA (PAINAD)
BREATHING: NORMAL
FACIALEXPRESSION: SMILING OR INEXPRESSIVE
BODYLANGUAGE: RELAXED
TOTALSCORE: 0
BREATHING: NORMAL
CONSOLABILITY: NO NEED TO CONSOLE
FACIALEXPRESSION: SMILING OR INEXPRESSIVE
BODYLANGUAGE: RELAXED
CONSOLABILITY: NO NEED TO CONSOLE
TOTALSCORE: 0

## 2024-05-30 ASSESSMENT — ACTIVITIES OF DAILY LIVING (ADL)
HOME_MANAGEMENT_TIME_ENTRY: 15
BATHING_COMMENTS: LB SPONGE BATH
BATHING_WHERE_ASSESSED: BED LEVEL
BATHING_LEVEL_OF_ASSISTANCE: DEPENDENT

## 2024-05-30 ASSESSMENT — PAIN SCALES - GENERAL
PAINLEVEL_OUTOF10: 0 - NO PAIN
PAINLEVEL_OUTOF10: 8
PAINLEVEL_OUTOF10: 0 - NO PAIN

## 2024-05-30 ASSESSMENT — PAIN - FUNCTIONAL ASSESSMENT
PAIN_FUNCTIONAL_ASSESSMENT: 0-10
PAIN_FUNCTIONAL_ASSESSMENT: FLACC (FACE, LEGS, ACTIVITY, CRY, CONSOLABILITY)
PAIN_FUNCTIONAL_ASSESSMENT: 0-10

## 2024-05-30 ASSESSMENT — COGNITIVE AND FUNCTIONAL STATUS - GENERAL
MOBILITY SCORE: 6
PERSONAL GROOMING: A LOT
HELP NEEDED FOR BATHING: TOTAL
MOVING TO AND FROM BED TO CHAIR: TOTAL
DAILY ACTIVITIY SCORE: 9
STANDING UP FROM CHAIR USING ARMS: TOTAL
TURNING FROM BACK TO SIDE WHILE IN FLAT BAD: TOTAL
EATING MEALS: A LITTLE
MOVING FROM LYING ON BACK TO SITTING ON SIDE OF FLAT BED WITH BEDRAILS: TOTAL
DRESSING REGULAR UPPER BODY CLOTHING: TOTAL
WALKING IN HOSPITAL ROOM: TOTAL
TOILETING: TOTAL
CLIMB 3 TO 5 STEPS WITH RAILING: TOTAL
DRESSING REGULAR LOWER BODY CLOTHING: TOTAL

## 2024-05-30 NOTE — NURSING NOTE
MRI not done d/t MRI safety sheet not completed, reached out to Lissy SIMMS) at [phone], no answer, left message with nursing unit number.

## 2024-05-30 NOTE — PROGRESS NOTES
Pulmonary Progress Note    Morena Joshi is a 84 y.o. female on day 4 of admission presenting with Somnolence.    Subjective   No acute events  On nasal canula  Objective   Vital Signs      5/29/2024    12:10 AM 5/29/2024     4:39 AM 5/29/2024     6:53 AM 5/29/2024    10:18 AM 5/29/2024    11:10 AM 5/29/2024     3:05 PM 5/29/2024     8:43 PM   Vitals   Systolic 141 133 135  121 121 95   Diastolic 67 57 70  61 62 57   Heart Rate 70 69 65  66 69 58   Temp 35.8 °C (96.4 °F) 36.4 °C (97.5 °F) 36.2 °C (97.2 °F)  35.9 °C (96.6 °F) 36 °C (96.8 °F) 36.3 °C (97.3 °F)   Resp 17 16 21  20 20 22   Weight (lb)  201.72  201.72      BMI  40.72 kg/m2  40.72 kg/m2      BSA (m2)  1.95 m2  1.95 m2          Oxygen Therapy  SpO2: 94 %  Medical Gas Therapy: Supplemental oxygen  O2 Delivery Method: Nasal cannula    FiO2 (%):  [36 %] 36 %    Intake/Output previous 24 hours:    Intake/Output Summary (Last 24 hours) at 5/29/2024 2107  Last data filed at 5/29/2024 1430  Gross per 24 hour   Intake 340 ml   Output --   Net 340 ml       Physical Exam  ...  Lines and Tubes:  Peripheral IV 05/25/24 20 G Left Antecubital (Active)   Placement Date/Time: 05/25/24 1244   Size (Gauge): 20 G  Orientation: Left  Location: Antecubital  Site Prep: Chlorhexidine    Number of days: 3       External Urinary Catheter Female (Active)   Placement Date/Time: 05/25/24 1645   Hand Hygiene Completed: Yes  External Catheter Type: Female   Number of days: 3         Scheduled medications  amLODIPine, 10 mg, oral, Daily  aspirin, 81 mg, oral, Daily  atorvastatin, 40 mg, oral, Nightly  carvedilol, 6.25 mg, oral, BID  dextrose, 25 g, intravenous, Once  docusate sodium, 100 mg, oral, Daily  hydrALAZINE, 25 mg, oral, TID  isosorbide mononitrate ER, 30 mg, oral, Daily  levothyroxine, 100 mcg, oral, Daily  nystatin, 1 Application, Topical, BID  oxygen, , inhalation, Continuous - Inhalation      Continuous medications     PRN medications  PRN medications: acetaminophen,  bisacodyl, hydrALAZINE, ipratropium-albuteroL, oxygen    Relevant Results  Results from last 7 days   Lab Units 05/29/24  0611 05/28/24  0529 05/27/24  0536   WBC AUTO x10*3/uL 9.6 9.1 8.5   HEMOGLOBIN g/dL 10.0* 10.5* 10.9*   HEMATOCRIT % 35.0* 36.3 37.9   PLATELETS AUTO x10*3/uL 272 272 279      Results from last 7 days   Lab Units 05/29/24  0611 05/28/24  0529 05/27/24  0536   SODIUM mmol/L 146* 146* 145   POTASSIUM mmol/L 4.4 4.1 4.6   CHLORIDE mmol/L 103 102 100   CO2 mmol/L 38* 36* 32*   BUN mg/dL 28* 33* 42*   CREATININE mg/dL 1.10 1.10 1.40   GLUCOSE mg/dL 82 78 134*   CALCIUM mg/dL 9.1 9.0 9.1      Results from last 7 days   Lab Units 05/28/24  1036   POCT PH, ARTERIAL pH 7.44*   POCT PCO2, ARTERIAL mm Hg 62*   POCT PO2, ARTERIAL mm Hg 74*   POCT HCO3 CALCULATED, ARTERIAL mmol/L 42.1*   POCT BASE EXCESS, ARTERIAL mmol/L 14.9*     XR chest 1 view 05/25/2024    Narrative  Interpreted By:  Jose Miguel Smith,  STUDY:  XR CHEST 1 VIEW;  5/25/2024 1:14 pm    INDICATION:  Signs/Symptoms:confusion.    COMPARISON:  03/09/2023    ACCESSION NUMBER(S):  [id number]    ORDERING CLINICIAN:  JHONATAN PADILLA    FINDINGS:  Scattered bilateral airspace opacities. Pleural effusions not  excluded. Cardiomegaly suspected. Aortic atherosclerosis.    Impression  Scattered bilateral infiltrates or edema.    MACRO:  None    Signed by: Jose Miguel Smith 5/25/2024 1:17 PM  Dictation workstation:   [id number]      Patient Active Problem List   Diagnosis    Somnolence     Assessment/Plan   Acute on probably chronic respiratory failure with hypoxia and hypercarbia  Metabolic encephalopathy: Stroke versus urinary tract infection versus hypercarbia.    Wheezing/acute bronchospasm:   Urinary tract infection  Continue with supplemental oxygen  BiPAP as needed  Continue with bronchodilators  Antibiotics      Bhupendra Carmichael MD  [facility]

## 2024-05-30 NOTE — PROGRESS NOTES
Physical Therapy Treatment    Patient Name: Morena Joshi  MRN: [id number]  Today's Date: 5/30/2024  Time Calculation  Start Time: 1423  Stop Time: 1448  Time Calculation (min): 25 min    Assessment/Plan   PT Assessment  Barriers to Discharge: assist x2 for bed mobility, Dep transfers OOB. Assist for all ADLs  Evaluation/Treatment Tolerance: Patient limited by fatigue  Medical Staff Made Aware: Yes  End of Session Communication: Bedside nurse  Assessment Comment: Demonstrated significant weakness warranting skilled PT care  End of Session Patient Position: Bed, 3 rail up, Alarm on     PT Plan  Treatment/Interventions: Bed mobility, Transfer training, Balance training, Strengthening, Endurance training, Range of motion, Therapeutic exercise, Therapeutic activity  PT Plan: Skilled PT  PT Frequency: 3 times per week  PT Discharge Recommendations: Moderate intensity level of continued care  Equipment Recommended upon Discharge: Lift  PT Recommended Transfer Status: Assist x2  PT - OK to Discharge: Yes      General Visit Information:   PT  Visit  PT Received On: 05/30/24  Response to Previous Treatment: Patient unable to report, no changes reported from family or staff  General  Family/Caregiver Present: No  Prior to Session Communication: Bedside nurse  Patient Position Received: Bed, 3 rail up, Alarm on  Preferred Learning Style: verbal  General Comment: Agreeable to PT follow up    Subjective   Precautions:  Precautions  Medical Precautions: Fall precautions  Vital Signs:     SPO2 briefly down to 83% with transfer to EOB from supine. RN present and aware. Reocovers in ~1-2mins of EOB sitting with pursed lip breathing practice/education. WNLs for the rest of this session.  Objective   Pain:  Pain Assessment  Pain Assessment: 0-10  Pain Score: 0 - No pain  Cognition:  Cognition  Overall Cognitive Status: Impaired  Orientation Level: Disoriented to time, Disoriented to situation  Insight: Severe  Processing Speed:  Delayed    Activity Tolerance:  Activity Tolerance  Endurance: Decreased tolerance for upright activites  Treatments:  Therapeutic Exercise  Therapeutic Exercise Performed: Yes  Therapeutic Exercise Activity 1: Pt educated on and completes Assisted B ankle pumps x10, Quad sets x10, and assisted B heel slides x10. Max verbal and tactile cues needed for completion    Therapeutic Activity  Therapeutic Activity Performed: Yes  Therapeutic Activity 1: Tolerates sitting at EOB for ~15mins. Fluctuating assist needs from Mod A to Max A to upright and steady throughout. Both static and dynamic sitting activities completed. Pt able to sit and reach for food on tray with Max A to support during reaching         Bed Mobility  Bed Mobility: Yes  Bed Mobility 1  Bed Mobility 1: Supine to sitting  Level of Assistance 1: Maximum assistance, +2  Bed Mobility Comments 1: assist for trunk and BLEs. Cues on technique  Bed Mobility 2  Bed Mobility  2: Sitting to supine  Level of Assistance 2: Maximum assistance, +2  Bed Mobility Comments 2: assist for trunk and BLEs. Cues on technique  Bed Mobility 3  Bed Mobility 3: Scooting  Level of Assistance 3: Dependent, +2  Bed Mobility Comments 3: with green sheet for repositioning    Transfers  Transfer: No    Outcome Measures:  [facility] Basic Mobility  Turning from your back to your side while in a flat bed without using bedrails: Total  Moving from lying on your back to sitting on the side of a flat bed without using bedrails: Total  Moving to and from bed to chair (including a wheelchair): Total  Standing up from a chair using your arms (e.g. wheelchair or bedside chair): Total  To walk in hospital room: Total  Climbing 3-5 steps with railing: Total  Basic Mobility - Total Score: 6    Education Documentation  Home Exercise Program, taught by Prakash Navarro, PT at 5/30/2024  3:03 PM.  Learner: Patient  Readiness: Acceptance  Method: Explanation, Demonstration  Response: Needs  Reinforcement  Comment: safe mobility techniques, HEP    Mobility Training, taught by Prakash Navarro, PT at 5/30/2024  3:03 PM.  Learner: Patient  Readiness: Acceptance  Method: Explanation, Demonstration  Response: Needs Reinforcement  Comment: safe mobility techniques, HEP    Education Comments  No comments found.        OP EDUCATION:       Encounter Problems       Encounter Problems (Active)       Mobility       Pt will participate w/ LE exercises to promote functional strength and mobility (Progressing)       Start:  05/28/24    Expected End:  06/07/24            Pt will tolerate sitting EOB x 20 minutes for functional endurance (Progressing)       Start:  05/28/24    Expected End:  06/07/24               PT Transfers       STG - Patient to transfer to and from sit to supine w/ mod assist of 2 (Progressing)       Start:  05/28/24    Expected End:  06/07/24            STG - Patient will transfer sit to and from stand w/ max assist of 2 tolerating 30 seconds or more (Progressing)       Start:  05/28/24    Expected End:  06/07/24

## 2024-05-30 NOTE — PROGRESS NOTES
Morena Joshi is a 84 y.o. female on day 5 of admission presenting with Somnolence.    Subjective    The patient was seen and examined.  Lying in the bed comfortably.   Patient was alert as clear to before.  Patient was denied any headache.       Objective     Physical Exam  HEENT:  Head externally atraumatic,  extraocular movements intact, oral mucosa moist  Neck:  Supple, no JVP, no palpable adenopathy or thyromegaly.  No carotid bruit.  Chest:  Clear to auscultation and resonant.  Heart:  Regular rate and rhythm, no murmur or gallop could be appreciated.  Abdomen:  Soft, nontender, bowel sounds present, normoactive, no palpable hepatosplenomegaly.  Extremities:   bilateral mild edema, pulses present, no cyanosis or clubbing.  CNS:  Patient alert, oriented to time, place and person.    No new deficit.  Cranial nerves 2-12 grossly intact  Skin:  No active rash.  Musculoskeletal:  No  apparent joint swelling or erythema, range of movement normal.  Last Recorded Vitals  Heart Rate:  [58-69]   Temp:  [35.9 °C (96.6 °F)-36.4 °C (97.5 °F)]   Resp:  [14-22]   BP: ()/(57-70)   Weight:  [91.5 kg (201 lb 11.5 oz)]   SpO2:  [90 %-96 %]     Intake/Output last 3 Shifts:  I/O last 3 completed shifts:  In: 515 (5.6 mL/kg) [P.O.:515]  Out: - (0 mL/kg)   Weight: 91.5 kg     Relevant Results  Susceptibility data from last 90 days.  Collected Specimen Info Organism Amoxicillin/Clavulanate Ampicillin Ampicillin/Sulbactam Cefazolin Cefazolin (uncomplicated UTIs only) Ciprofloxacin Gentamicin Nitrofurantoin Piperacillin/Tazobactam Trimethoprim/Sulfamethoxazole   05/25/24 Urine from Clean Catch/Voided Klebsiella pneumoniae/variicola  S  R  S  S  S  S  S  S  S  S     Results for orders placed or performed during the hospital encounter of 05/25/24 (from the past 24 hour(s))   CBC and Auto Differential   Result Value Ref Range    WBC 9.6 4.4 - 11.3 x10*3/uL    nRBC 0.0 0.0 - 0.0 /100 WBCs    RBC 3.81 (L) 4.00 - 5.20 x10*6/uL     Hemoglobin 10.0 (L) 12.0 - 16.0 g/dL    Hematocrit 35.0 (L) 36.0 - 46.0 %    MCV 92 80 - 100 fL    MCH 26.2 26.0 - 34.0 pg    MCHC 28.6 (L) 32.0 - 36.0 g/dL    RDW 14.0 11.5 - 14.5 %    Platelets 272 150 - 450 x10*3/uL    Neutrophils % 55.1 40.0 - 80.0 %    Immature Granulocytes %, Automated 0.5 0.0 - 0.9 %    Lymphocytes % 25.4 13.0 - 44.0 %    Monocytes % 14.2 2.0 - 10.0 %    Eosinophils % 4.3 0.0 - 6.0 %    Basophils % 0.5 0.0 - 2.0 %    Neutrophils Absolute 5.30 1.60 - 5.50 x10*3/uL    Immature Granulocytes Absolute, Automated 0.05 0.00 - 0.50 x10*3/uL    Lymphocytes Absolute 2.44 0.80 - 3.00 x10*3/uL    Monocytes Absolute 1.36 (H) 0.05 - 0.80 x10*3/uL    Eosinophils Absolute 0.41 (H) 0.00 - 0.40 x10*3/uL    Basophils Absolute 0.05 0.00 - 0.10 x10*3/uL   Basic Metabolic Panel   Result Value Ref Range    Glucose 82 65 - 99 mg/dL    Sodium 146 (H) 133 - 145 mmol/L    Potassium 4.4 3.4 - 5.1 mmol/L    Chloride 103 97 - 107 mmol/L    Bicarbonate 38 (H) 24 - 31 mmol/L    Urea Nitrogen 28 (H) 8 - 25 mg/dL    Creatinine 1.10 0.40 - 1.60 mg/dL    eGFR 50 (L) >60 mL/min/1.73m*2    Calcium 9.1 8.5 - 10.4 mg/dL    Anion Gap 5 <=19 mmol/L        Current Facility-Administered Medications:     acetaminophen (Tylenol) suppository 650 mg, 650 mg, rectal, q4h PRN, Alyssa Carlisle DO, 650 mg at 05/27/24 0429    amLODIPine (Norvasc) tablet 10 mg, 10 mg, oral, Daily, Srinivas Andino MD, 10 mg at 05/29/24 0843    aspirin EC tablet 81 mg, 81 mg, oral, Daily, Rishi Casas MD, 81 mg at 05/29/24 0843    atorvastatin (Lipitor) tablet 40 mg, 40 mg, oral, Nightly, Srinivas Andino MD, 40 mg at 05/29/24 2227    bisacodyl (Dulcolax) suppository 10 mg, 10 mg, rectal, Daily PRN, Alyssa Carlisle DO    carvedilol (Coreg) tablet 6.25 mg, 6.25 mg, oral, BID, Srinivas Andino MD, 6.25 mg at 05/29/24 1644    dextrose 50 % injection 25 g, 25 g, intravenous, Once, Srinivas Andino MD    docusate sodium (Colace) capsule 100 mg, 100 mg, oral, Daily, Srinivas  MD Sina, 100 mg at 05/29/24 0843    hydrALAZINE (Apresoline) injection 10 mg, 10 mg, intravenous, q6h PRN, Srinivas Andino MD    hydrALAZINE (Apresoline) tablet 25 mg, 25 mg, oral, TID, Srinivas Andino MD, 25 mg at 05/29/24 2227    ipratropium-albuteroL (Duo-Neb) 0.5-2.5 mg/3 mL nebulizer solution 3 mL, 3 mL, nebulization, q4h PRN, Srinivas Andino MD    isosorbide mononitrate ER (Imdur) 24 hr tablet 30 mg, 30 mg, oral, Daily, Srinivas Andino MD, 30 mg at 05/29/24 0843    levothyroxine (Synthroid, Levoxyl) tablet 100 mcg, 100 mcg, oral, Daily, Srinivas Andino MD, 100 mcg at 05/29/24 0640    nystatin (Mycostatin) 100,000 unit/gram powder 1 Application, 1 Application, Topical, BID, Srinivas Andino MD, 1 Application at 05/29/24 2234    oxygen (O2) therapy, , inhalation, Continuous PRN - O2/gases, Srinivas Andino MD, 5 L/min at 05/25/24 1849    oxygen (O2) therapy, , inhalation, Continuous - Inhalation, Rishi Casas MD, 4 L/min at 05/29/24 2000   Assessment/Plan   Principal Problem:    Somnolence    Acute on chronic respiratory failure hypoxic and hypercarbic  Toxic and metabolic encephalopathy   Urinary tract infection   Acute kidney injury   Hypertension   Dyslipidemia  Hypothyroidism   Congestive heart failure  Chronic obstructive pulmonary disease    Plan Continue current medication.  Monitor CBC and basic metabolic panel.  Increase activity continue antibiotics.  Continue treatment.  Denied as needed.  Give physical therapy occupation therapy.  Urine culture we will take deep vein thrombosis come fall, aspiration decubitus, and deep vein thrombosis precautions.  This has been discussed with the patient and family at the bedside.  They are agreeable to it.      Rishi Casas MD

## 2024-05-30 NOTE — PROGRESS NOTES
05/30/24 0855   Discharge Planning   Patient expects to be discharged to: Patient is a bed hold at [facility] and can return when medically appropriate.   Does the patient need discharge transport arranged? Yes   RoundTrip coordination needed? Yes   Has discharge transport been arranged? No     12:19 PM Dr. Casas reports possible discharge today; [facility] updated to the same.    12:36 PM Called patient's daughter and updated her to the above. She states understanding and requests a call when transportation time is known.

## 2024-05-30 NOTE — PROGRESS NOTES
Occupational Therapy    Occupational Therapy Treatment    Name: Morena Joshi  MRN: [id number]  : 1940  Date: 24  Time Calculation  Start Time: 1013  Stop Time: 1040  Time Calculation (min): 27 min    Assessment:  OT Assessment: Patient will continue to benefit from skilled OT in order to increase patient's safety and independence with daily tasks.  Prognosis: Good  Barriers to Discharge: None  Evaluation/Treatment Tolerance: Patient limited by fatigue  End of Session Communication: Bedside nurse  End of Session Patient Position: Bed, 3 rail up, Alarm on  Plan:  Treatment Interventions: ADL retraining, Functional transfer training, UE strengthening/ROM, Endurance training, Patient/family training, Compensatory technique education  OT Frequency: 3 times per week  OT Discharge Recommendations: Moderate intensity level of continued care  Equipment Recommended upon Discharge: Lift  OT Recommended Transfer Status: Dependent  OT - OK to Discharge: Yes    Subjective   Previous Visit Info:  OT Last Visit  OT Received On: 24  General:  General  Reason for Referral: decline in ADLs  Referred By: Dr. Andino  Past Medical History Relevant to Rehab: COPD, HTN, HLD, Hypothyroidism  Family/Caregiver Present: No  Prior to Session Communication: Bedside nurse  Patient Position Received: Bed, 3 rail up, Alarm off, not on at start of session  Preferred Learning Style: verbal  General Comment: Patient cleared by nursing for therapy. Patient in bed upon arrival and agreeable to participate. Therapist notices patient's linen soiled, patient agreeable to ADLs  Precautions:  Medical Precautions: Fall precautions, Oxygen therapy device and L/min    Pain Assessment:  Pain Assessment  Pain Assessment: 0-10  Pain Score: 8  Pain Type: Chronic pain  Pain Location: Generalized  Pain Interventions: Repositioned     Objective   Cognition:  Overall Cognitive Status: Impaired at baseline (confused, able to follow some simple motor  commands)  Orientation Level: Disoriented to situation  Safety/Judgement: Exceptions to WFL  Insight: Severe  Impulsive: Mildly  Processing Speed: Delayed  Activities of Daily Living: UE Bathing  UE Bathing Level of Assistance: Dependent  UE Bathing Where Assessed: Bed level  UE Bathing Comments: UB sponge bath    LE Bathing  LE Bathing Level of Assistance: Dependent  LE Bathing Where Assessed: Bed level  LE Bathing Comments: LB sponge bath    UE Dressing  UE Dressing Level of Assistance: Maximum assistance  UE Dressing Where Assessed: Bed level  UE Dressing Comments: doff/don gown    LE Dressing  LE Dressing: Yes  Sock Level of Assistance: Dependent  LE Dressing Where Assessed: Bed level  LE Dressing Comments: don socks    Toileting  Toileting Level of Assistance: Dependent  Where Assessed: Bed level  Toileting Comments: incontiennt. full bed change needed. guerda hygiene tasks    Bed Mobility/Transfers: Bed Mobility  Bed Mobility: Yes  Bed Mobility 1  Bed Mobility 1: Supine to sitting  Level of Assistance 1: Maximum assistance, +2, Moderate verbal cues  Bed Mobility Comments 1: assist for trunk up and to move BLE  Bed Mobility 2  Bed Mobility  2: Sitting to supine  Level of Assistance 2: Maximum assistance, +2  Bed Mobility Comments 2: assist to raise B LE and support trunk  Bed Mobility 3  Bed Mobility 3: Rolling right, Rolling left  Level of Assistance 3: Maximum assistance, +2  Bed Mobility Comments 3: with use of bed rails  Bed Mobility 4  Bed Mobility 4: Scooting  Level of Assistance 4: Dependent, +2  Bed Mobility Comments 4: with use of draw sheet to boost patient up in bed    Transfers  Transfer: No (not safe to attempt)    Sitting Balance:  Static Sitting Balance  Static Sitting-Balance Support: Feet supported, Bilateral upper extremity supported  Static Sitting-Level of Assistance: Contact guard, Moderate assistance  Static Sitting-Comment/Number of Minutes: 5 minutes, varying levels of  assist    Therapy/Activity: Therapeutic Activity  Therapeutic Activity Performed: Yes  Therapeutic Activity 1: patient sits EOB with varying levels of assist from CGA to Mod A. Periods of L sided lean, cues to correct    RUE   RUE : Exceptions to WFL (decreased shoulder) and LUE   LUE: Exceptions to WFL (decreased shoulder)    Outcome Measures:  [facility] Daily Activity  Putting on and taking off regular lower body clothing: Total  Bathing (including washing, rinsing, drying): Total  Putting on and taking off regular upper body clothing: Total  Toileting, which includes using toilet, bedpan or urinal: Total  Taking care of personal grooming such as brushing teeth: A lot  Eating Meals: A little  Daily Activity - Total Score: 9    Education Documentation  Body Mechanics, taught by Imelda Siddiqi OT at 5/30/2024 11:45 AM.  Learner: Patient  Readiness: Acceptance  Method: Explanation, Demonstration  Response: Needs Reinforcement    Precautions, taught by Imelda Siddiqi OT at 5/30/2024 11:45 AM.  Learner: Patient  Readiness: Acceptance  Method: Explanation, Demonstration  Response: Needs Reinforcement    ADL Training, taught by Imelda Siddiqi OT at 5/30/2024 11:45 AM.  Learner: Patient  Readiness: Acceptance  Method: Explanation, Demonstration  Response: Needs Reinforcement    Education Comments  No comments found.      Goals:  Encounter Problems       Encounter Problems (Active)       OT Goals       UB ADLs (Progressing)       Start:  05/28/24    Expected End:  06/20/24       Patient will complete UB ADLs with Close Supervision, using AE as needed, in order to increase safety and independence with self-care tasks.         LB ADLs (Progressing)       Start:  05/28/24    Expected End:  06/20/24       Patient will complete LB ADLs with Close Supervision, using AE as needed, in order to increase safety and independence with self-care tasks.           B UE Strengthening (Progressing)       Start:  05/28/24    Expected End:   06/20/24       Patient will increase B UE strength to 3+/5 for functional transfers.         Functional Transfers (Progressing)       Start:  05/28/24    Expected End:  06/20/24       Patient will complete bed mobility tasks with Min A in order to increase patient's safety and independence with daily tasks.         Sitting Balance (Progressing)       Start:  05/28/24    Expected End:  06/20/24       Patient will demonstrate the ability to sit EOB at least >/= 25 minutes with Fair+ balance for increased safety and independence with daily tasks.

## 2024-05-31 ENCOUNTER — APPOINTMENT (OUTPATIENT)
Dept: RADIOLOGY | Facility: HOSPITAL | Age: 84
DRG: 871 | End: 2024-05-31
Payer: MEDICARE

## 2024-05-31 PROCEDURE — 2500000001 HC RX 250 WO HCPCS SELF ADMINISTERED DRUGS (ALT 637 FOR MEDICARE OP): Performed by: INTERNAL MEDICINE

## 2024-05-31 PROCEDURE — 2500000005 HC RX 250 GENERAL PHARMACY W/O HCPCS: Performed by: INTERNAL MEDICINE

## 2024-05-31 PROCEDURE — 97110 THERAPEUTIC EXERCISES: CPT | Mod: GO

## 2024-05-31 PROCEDURE — 94660 CPAP INITIATION&MGMT: CPT

## 2024-05-31 PROCEDURE — 70551 MRI BRAIN STEM W/O DYE: CPT | Performed by: RADIOLOGY

## 2024-05-31 PROCEDURE — 2500000001 HC RX 250 WO HCPCS SELF ADMINISTERED DRUGS (ALT 637 FOR MEDICARE OP): Performed by: FAMILY MEDICINE

## 2024-05-31 PROCEDURE — 97530 THERAPEUTIC ACTIVITIES: CPT | Mod: GO

## 2024-05-31 PROCEDURE — 2060000001 HC INTERMEDIATE ICU ROOM DAILY

## 2024-05-31 PROCEDURE — 70551 MRI BRAIN STEM W/O DYE: CPT

## 2024-05-31 RX ADMIN — ASPIRIN 81 MG: 81 TABLET, COATED ORAL at 09:55

## 2024-05-31 RX ADMIN — HYDRALAZINE HYDROCHLORIDE 25 MG: 25 TABLET ORAL at 20:30

## 2024-05-31 RX ADMIN — CARVEDILOL 6.25 MG: 6.25 TABLET, FILM COATED ORAL at 09:55

## 2024-05-31 RX ADMIN — ATORVASTATIN CALCIUM 40 MG: 40 TABLET, FILM COATED ORAL at 20:30

## 2024-05-31 RX ADMIN — ISOSORBIDE MONONITRATE 30 MG: 30 TABLET, EXTENDED RELEASE ORAL at 09:55

## 2024-05-31 RX ADMIN — NYSTATIN 1 APPLICATION: 100000 POWDER TOPICAL at 10:06

## 2024-05-31 RX ADMIN — NYSTATIN 1 APPLICATION: 100000 POWDER TOPICAL at 20:30

## 2024-05-31 RX ADMIN — HYDRALAZINE HYDROCHLORIDE 25 MG: 25 TABLET ORAL at 09:55

## 2024-05-31 RX ADMIN — LEVOTHYROXINE SODIUM 100 MCG: 0.1 TABLET ORAL at 06:27

## 2024-05-31 RX ADMIN — Medication 4 L/MIN: at 08:00

## 2024-05-31 RX ADMIN — AMLODIPINE BESYLATE 10 MG: 10 TABLET ORAL at 09:55

## 2024-05-31 RX ADMIN — HYDRALAZINE HYDROCHLORIDE 25 MG: 25 TABLET ORAL at 15:16

## 2024-05-31 RX ADMIN — DOCUSATE SODIUM 100 MG: 100 CAPSULE, LIQUID FILLED ORAL at 09:56

## 2024-05-31 RX ADMIN — CARVEDILOL 6.25 MG: 6.25 TABLET, FILM COATED ORAL at 18:22

## 2024-05-31 RX ADMIN — Medication 4 L/MIN: at 20:00

## 2024-05-31 ASSESSMENT — COGNITIVE AND FUNCTIONAL STATUS - GENERAL
DAILY ACTIVITIY SCORE: 11
HELP NEEDED FOR BATHING: A LOT
EATING MEALS: A LITTLE
PERSONAL GROOMING: A LOT
TOILETING: TOTAL
DRESSING REGULAR UPPER BODY CLOTHING: A LOT
DRESSING REGULAR LOWER BODY CLOTHING: TOTAL

## 2024-05-31 ASSESSMENT — PAIN SCALES - GENERAL
PAINLEVEL_OUTOF10: 0 - NO PAIN
PAINLEVEL_OUTOF10: 0 - NO PAIN
PAINLEVEL_OUTOF10: 5 - MODERATE PAIN

## 2024-05-31 ASSESSMENT — PAIN - FUNCTIONAL ASSESSMENT
PAIN_FUNCTIONAL_ASSESSMENT: 0-10
PAIN_FUNCTIONAL_ASSESSMENT: FLACC (FACE, LEGS, ACTIVITY, CRY, CONSOLABILITY)

## 2024-05-31 ASSESSMENT — PAIN SCALES - PAIN ASSESSMENT IN ADVANCED DEMENTIA (PAINAD)
FACIALEXPRESSION: SMILING OR INEXPRESSIVE
BREATHING: NORMAL
TOTALSCORE: 0
CONSOLABILITY: NO NEED TO CONSOLE
BODYLANGUAGE: RELAXED

## 2024-05-31 NOTE — PROGRESS NOTES
Morena Joshi is a 84 y.o. female on day 6 of admission presenting with Somnolence.    Subjective   Patient was seen and examined.  Lying in the bed.  Comfortable.  Not in acute distress.  Patient denies any headache, dizziness, nausea or vomiting.       Objective     Physical Exam  HEENT:  Head externally atraumatic,  extraocular movements intact, oral mucosa moist  Neck:  Supple, no JVP, no palpable adenopathy or thyromegaly.  No carotid bruit.  Chest:  Clear to auscultation and resonant.  Heart:  Regular rate and rhythm, no murmur or gallop could be appreciated.  Abdomen:  Soft, obesity, nontender, bowel sounds present, normoactive, no palpable hepatosplenomegaly.  Extremities: Bilateral edema present, pulses present, no cyanosis or clubbing.  CNS:  Patient alert, oriented to time, place and person.    No new deficit.  Cranial nerves 2-12 grossly intact  Skin:  No active rash.  Musculoskeletal:  No  apparent joint swelling or erythema, range of movement normal.  Last Recorded Vitals  Heart Rate:  [63-74]   Temp:  [36.2 °C (97.2 °F)-36.5 °C (97.7 °F)]   Resp:  [13-22]   BP: ()/(49-64)   Weight:  [92.9 kg (204 lb 12.8 oz)]   SpO2:  [90 %-97 %]     Intake/Output last 3 Shifts:  I/O last 3 completed shifts:  In: 250 (2.6 mL/kg) [P.O.:250]  Out: - (0 mL/kg)   Weight: 94.7 kg     Relevant Results  Susceptibility data from last 90 days.  Collected Specimen Info Organism Amoxicillin/Clavulanate Ampicillin Ampicillin/Sulbactam Cefazolin Cefazolin (uncomplicated UTIs only) Ciprofloxacin Gentamicin Nitrofurantoin Piperacillin/Tazobactam Trimethoprim/Sulfamethoxazole   05/25/24 Urine from Clean Catch/Voided Klebsiella pneumoniae/variicola  S  R  S  S  S  S  S  S  S  S     No results found for this or any previous visit (from the past 24 hour(s)).     Current Facility-Administered Medications:     acetaminophen (Tylenol) suppository 650 mg, 650 mg, rectal, q4h PRN, Alyssa Carlisle, DO, 650 mg at 05/27/24 0429     amLODIPine (Norvasc) tablet 10 mg, 10 mg, oral, Daily, Srinivas Andino MD, 10 mg at 05/31/24 0955    aspirin EC tablet 81 mg, 81 mg, oral, Daily, Rishi Casas MD, 81 mg at 05/31/24 0955    atorvastatin (Lipitor) tablet 40 mg, 40 mg, oral, Nightly, rSinivas Andino MD, 40 mg at 05/30/24 2106    bisacodyl (Dulcolax) suppository 10 mg, 10 mg, rectal, Daily PRN, Alyssa Carlisle DO    carvedilol (Coreg) tablet 6.25 mg, 6.25 mg, oral, BID, Srinivas Andino MD, 6.25 mg at 05/31/24 1822    dextrose 50 % injection 25 g, 25 g, intravenous, Once, Srinivas Andino MD    docusate sodium (Colace) capsule 100 mg, 100 mg, oral, Daily, Srinivas Andino MD, 100 mg at 05/31/24 0956    hydrALAZINE (Apresoline) injection 10 mg, 10 mg, intravenous, q6h PRN, Srinivas Andino MD    hydrALAZINE (Apresoline) tablet 25 mg, 25 mg, oral, TID, Srinivas Andino MD, 25 mg at 05/31/24 1516    ipratropium-albuteroL (Duo-Neb) 0.5-2.5 mg/3 mL nebulizer solution 3 mL, 3 mL, nebulization, q4h PRN, Srinivas Andino MD    isosorbide mononitrate ER (Imdur) 24 hr tablet 30 mg, 30 mg, oral, Daily, Srinivas Andino MD, 30 mg at 05/31/24 0955    levothyroxine (Synthroid, Levoxyl) tablet 100 mcg, 100 mcg, oral, Daily, Srinivas Andino MD, 100 mcg at 05/31/24 0627    nystatin (Mycostatin) 100,000 unit/gram powder 1 Application, 1 Application, Topical, BID, Srinivas Andino MD, 1 Application at 05/31/24 1006    oxygen (O2) therapy, , inhalation, Continuous PRN - O2/gases, Srinivas Andino MD, 5 L/min at 05/25/24 1849    oxygen (O2) therapy, , inhalation, Continuous - Inhalation, Rishi Casas MD, 4 L/min at 05/31/24 0800   Assessment/Plan   Principal Problem:    Somnolence  Acute respiratory failure  Acute on chronic respiratory failure with hypoxia  Morbid obesity  Metabolic encephalopathy  UTI    Plan: Continue current medicine give supportive care.  Physical therapy and Occupational Therapy.  Monitor CBC and BMP.  MRI was done today.  Waiting for MRI report.  Continue with antibiotics.  We  will take DVT, fall, aspiration, decubitus and DVT precautions.       Rishi Casas MD

## 2024-05-31 NOTE — PROGRESS NOTES
Pulmonary Progress Note    Morena Joshi is a 84 y.o. female on day 5 of admission presenting with Somnolence.    Subjective   No acute events  On nasal canula  Objective   Vital Signs      5/30/2024     3:35 AM 5/30/2024     7:13 AM 5/30/2024     7:22 AM 5/30/2024    11:52 AM 5/30/2024     3:29 PM 5/30/2024     7:55 PM 5/30/2024     9:00 PM   Vitals   Systolic 118  132 116 107 109 98   Diastolic 62  60 64 47 55 49   Heart Rate 70  76 63 67 68    Temp 36.2 °C (97.2 °F)  36.5 °C (97.7 °F) 36.2 °C (97.2 °F) 36.1 °C (97 °F) 36.5 °C (97.7 °F)    Resp 23     20    Weight (lb)  208.78        BMI  42.14 kg/m2        BSA (m2)  1.99 m2            Oxygen Therapy  SpO2: 96 %  Medical Gas Therapy: Supplemental oxygen  O2 Delivery Method: Nasal cannula    FiO2 (%):  [36 %] 36 %    Intake/Output previous 24 hours:  No intake or output data in the 24 hours ending 05/30/24 2155      Physical Exam  Vitals reviewed.   Constitutional:       Appearance: Normal appearance.   HENT:      Head: Normocephalic and atraumatic.      Mouth/Throat:      Mouth: Mucous membranes are moist.   Eyes:      Extraocular Movements: Extraocular movements intact.      Pupils: Pupils are equal, round, and reactive to light.   Cardiovascular:      Rate and Rhythm: Normal rate and regular rhythm.   Pulmonary:      Effort: Pulmonary effort is normal.      Breath sounds: Normal breath sounds and air entry.   Abdominal:      General: Abdomen is flat. Bowel sounds are normal.      Palpations: Abdomen is soft.   Musculoskeletal:         General: Normal range of motion.      Cervical back: Normal range of motion and neck supple.   Skin:     General: Skin is warm and dry.   Neurological:      General: No focal deficit present.      Mental Status: She is alert and oriented to person, place, and time. Mental status is at baseline.       ...  Lines and Tubes:  Peripheral IV 05/25/24 20 G Left Antecubital (Active)   Placement Date/Time: 05/25/24 1244   Size  (Gauge): 20 G  Orientation: Left  Location: Antecubital  Site Prep: Chlorhexidine    Number of days: 3       External Urinary Catheter Female (Active)   Placement Date/Time: 05/25/24 1645   Hand Hygiene Completed: Yes  External Catheter Type: Female   Number of days: 3         Scheduled medications  amLODIPine, 10 mg, oral, Daily  aspirin, 81 mg, oral, Daily  atorvastatin, 40 mg, oral, Nightly  carvedilol, 6.25 mg, oral, BID  dextrose, 25 g, intravenous, Once  docusate sodium, 100 mg, oral, Daily  hydrALAZINE, 25 mg, oral, TID  isosorbide mononitrate ER, 30 mg, oral, Daily  levothyroxine, 100 mcg, oral, Daily  nystatin, 1 Application, Topical, BID  oxygen, , inhalation, Continuous - Inhalation      Continuous medications     PRN medications  PRN medications: acetaminophen, bisacodyl, hydrALAZINE, ipratropium-albuteroL, oxygen    Relevant Results  Results from last 7 days   Lab Units 05/29/24  0611 05/28/24  0529 05/27/24  0536   WBC AUTO x10*3/uL 9.6 9.1 8.5   HEMOGLOBIN g/dL 10.0* 10.5* 10.9*   HEMATOCRIT % 35.0* 36.3 37.9   PLATELETS AUTO x10*3/uL 272 272 279      Results from last 7 days   Lab Units 05/29/24  0611 05/28/24  0529 05/27/24  0536   SODIUM mmol/L 146* 146* 145   POTASSIUM mmol/L 4.4 4.1 4.6   CHLORIDE mmol/L 103 102 100   CO2 mmol/L 38* 36* 32*   BUN mg/dL 28* 33* 42*   CREATININE mg/dL 1.10 1.10 1.40   GLUCOSE mg/dL 82 78 134*   CALCIUM mg/dL 9.1 9.0 9.1      Results from last 7 days   Lab Units 05/28/24  1036   POCT PH, ARTERIAL pH 7.44*   POCT PCO2, ARTERIAL mm Hg 62*   POCT PO2, ARTERIAL mm Hg 74*   POCT HCO3 CALCULATED, ARTERIAL mmol/L 42.1*   POCT BASE EXCESS, ARTERIAL mmol/L 14.9*     XR chest 1 view 05/25/2024    Narrative  Interpreted By:  Jose Miguel Smith,  STUDY:  XR CHEST 1 VIEW;  5/25/2024 1:14 pm    INDICATION:  Signs/Symptoms:confusion.    COMPARISON:  03/09/2023    ACCESSION NUMBER(S):  [id number]    ORDERING CLINICIAN:  JHONATAN PADILLA    FINDINGS:  Scattered bilateral airspace  opacities. Pleural effusions not  excluded. Cardiomegaly suspected. Aortic atherosclerosis.    Impression  Scattered bilateral infiltrates or edema.    MACRO:  None    Signed by: Jose Miguel Smith 5/25/2024 1:17 PM  Dictation workstation:   [id number]      Patient Active Problem List   Diagnosis    Somnolence     Assessment/Plan   Acute on probably chronic respiratory failure with hypoxia and hypercarbia  Metabolic encephalopathy: Stroke versus urinary tract infection versus hypercarbia.    Wheezing/acute bronchospasm:   Urinary tract infection  Continue with supplemental oxygen  BiPAP as needed  Continue with bronchodilators  Antibiotics      Bhupendra Carmichael MD  [facility]

## 2024-05-31 NOTE — CARE PLAN
Problem: Discharge Planning  Goal: Discharge to home or other facility with appropriate resources  Outcome: Progressing  Flowsheets (Taken 5/30/2024 2045 by Kristi Dailey, RN)  Discharge to home or other facility with appropriate resources:   Identify barriers to discharge with patient and caregiver   Arrange for needed discharge resources and transportation as appropriate   Identify discharge learning needs (meds, wound care, etc)   Arrange for interpreters to assist at discharge as needed   Refer to discharge planning if patient needs post-hospital services based on physician order or complex needs related to functional status, cognitive ability or social support system     Problem: Chronic Conditions and Co-morbidities  Goal: Patient's chronic conditions and co-morbidity symptoms are monitored and maintained or improved  Outcome: Progressing  Flowsheets (Taken 5/30/2024 2045 by Kristi Dailey RN)  Care Plan - Patient's Chronic Conditions and Co-Morbidity Symptoms are Monitored and Maintained or Improved:   Monitor and assess patient's chronic conditions and comorbid symptoms for stability, deterioration, or improvement   Update acute care plan with appropriate goals if chronic or comorbid symptoms are exacerbated and prevent overall improvement and discharge   Collaborate with multidisciplinary team to address chronic and comorbid conditions and prevent exacerbation or deterioration     Problem: Skin  Goal: Promote/optimize nutrition  Outcome: Progressing  Flowsheets (Taken 5/31/2024 1401)  Promote/optimize nutrition:   Assist with feeding   Monitor/record intake including meals   Consume > 50% meals/supplements   Offer water/supplements/favorite foods     Problem: General Stroke  Goal: Establish a mutual long term goal with patient by discharge  Outcome: Progressing  Flowsheets (Taken 5/31/2024 1401)  Establish a mutual long term goal with patient by discharge:   Monitor blood pressure   Attend  medical follow-up appointments  Goal: Demonstrate improvement in neurological exam throughout the shift  Outcome: Progressing  Goal: Participate in treatment (ie., meds, therapy) throughout shift  Outcome: Progressing  Goal: Out of bed three times today  Outcome: Progressing   The patient's goals for the shift include UTD    The clinical goals for the shift include Complete MRI safety form with family member by end of shift.    Over the shift, the patient did not make progress toward the following goals. Barriers to progression include MRI. Recommendations to address these barriers include Complete MRI.

## 2024-05-31 NOTE — PROGRESS NOTES
Pulmonary Progress Note    Morena Joshi is a 84 y.o. female on day 6 of admission presenting with Somnolence.    Subjective   No acute events  On nasal canula  Objective   Vital Signs      5/30/2024     3:29 PM 5/30/2024     7:55 PM 5/30/2024     9:00 PM 5/30/2024    11:56 PM 5/31/2024     4:09 AM 5/31/2024     7:39 AM 5/31/2024     9:00 AM   Vitals   Systolic 107 109 98 119 127 125 152   Diastolic 47 55 49 64 63 58 60   Heart Rate 67 68  71 69 65 63   Temp 36.1 °C (97 °F) 36.5 °C (97.7 °F)  36.3 °C (97.3 °F) 36.4 °C (97.5 °F) 36.2 °C (97.2 °F)    Resp  20  22 15  13       Oxygen Therapy  SpO2: 92 %  Medical Gas Therapy: Supplemental oxygen  O2 Delivery Method: Nasal cannula    FiO2 (%):  [36 %] 36 %    Intake/Output previous 24 hours:    Intake/Output Summary (Last 24 hours) at 5/31/2024 1050  Last data filed at 5/30/2024 2356  Gross per 24 hour   Intake 250 ml   Output --   Net 250 ml       Physical Exam  Vitals reviewed.   Constitutional:       Appearance: Normal appearance.   HENT:      Head: Normocephalic and atraumatic.      Mouth/Throat:      Mouth: Mucous membranes are moist.   Eyes:      Extraocular Movements: Extraocular movements intact.      Pupils: Pupils are equal, round, and reactive to light.   Cardiovascular:      Rate and Rhythm: Normal rate and regular rhythm.   Pulmonary:      Effort: Pulmonary effort is normal.      Breath sounds: Normal breath sounds and air entry.   Abdominal:      General: Abdomen is flat. Bowel sounds are normal.      Palpations: Abdomen is soft.   Musculoskeletal:         General: Normal range of motion.      Cervical back: Normal range of motion and neck supple.   Skin:     General: Skin is warm and dry.   Neurological:      General: No focal deficit present.      Mental Status: She is alert and oriented to person, place, and time. Mental status is at baseline.       ...  Lines and Tubes:  Peripheral IV 05/25/24 20 G Left Antecubital (Active)   Placement Date/Time:  05/25/24 1244   Size (Gauge): 20 G  Orientation: Left  Location: Antecubital  Site Prep: Chlorhexidine    Number of days: 3       External Urinary Catheter Female (Active)   Placement Date/Time: 05/25/24 1645   Hand Hygiene Completed: Yes  External Catheter Type: Female   Number of days: 3         Scheduled medications  amLODIPine, 10 mg, oral, Daily  aspirin, 81 mg, oral, Daily  atorvastatin, 40 mg, oral, Nightly  carvedilol, 6.25 mg, oral, BID  dextrose, 25 g, intravenous, Once  docusate sodium, 100 mg, oral, Daily  hydrALAZINE, 25 mg, oral, TID  isosorbide mononitrate ER, 30 mg, oral, Daily  levothyroxine, 100 mcg, oral, Daily  nystatin, 1 Application, Topical, BID  oxygen, , inhalation, Continuous - Inhalation      Continuous medications     PRN medications  PRN medications: acetaminophen, bisacodyl, hydrALAZINE, ipratropium-albuteroL, oxygen    Relevant Results  Results from last 7 days   Lab Units 05/29/24  0611 05/28/24  0529 05/27/24  0536   WBC AUTO x10*3/uL 9.6 9.1 8.5   HEMOGLOBIN g/dL 10.0* 10.5* 10.9*   HEMATOCRIT % 35.0* 36.3 37.9   PLATELETS AUTO x10*3/uL 272 272 279      Results from last 7 days   Lab Units 05/29/24  0611 05/28/24  0529 05/27/24  0536   SODIUM mmol/L 146* 146* 145   POTASSIUM mmol/L 4.4 4.1 4.6   CHLORIDE mmol/L 103 102 100   CO2 mmol/L 38* 36* 32*   BUN mg/dL 28* 33* 42*   CREATININE mg/dL 1.10 1.10 1.40   GLUCOSE mg/dL 82 78 134*   CALCIUM mg/dL 9.1 9.0 9.1      Results from last 7 days   Lab Units 05/28/24  1036   POCT PH, ARTERIAL pH 7.44*   POCT PCO2, ARTERIAL mm Hg 62*   POCT PO2, ARTERIAL mm Hg 74*   POCT HCO3 CALCULATED, ARTERIAL mmol/L 42.1*   POCT BASE EXCESS, ARTERIAL mmol/L 14.9*     XR chest 1 view 05/25/2024    Narrative  Interpreted By:  Jose Miguel Smith,  STUDY:  XR CHEST 1 VIEW;  5/25/2024 1:14 pm    INDICATION:  Signs/Symptoms:confusion.    COMPARISON:  03/09/2023    ACCESSION NUMBER(S):  [id number]    ORDERING CLINICIAN:  JHONATAN PADILLA    FINDINGS:  Scattered  bilateral airspace opacities. Pleural effusions not  excluded. Cardiomegaly suspected. Aortic atherosclerosis.    Impression  Scattered bilateral infiltrates or edema.    MACRO:  None    Signed by: Jose Miguel Smith 5/25/2024 1:17 PM  Dictation workstation:   [id number]      Patient Active Problem List   Diagnosis    Somnolence     Assessment/Plan   Acute on probably chronic respiratory failure with hypoxia and hypercarbia  Metabolic encephalopathy: Stroke versus urinary tract infection versus hypercarbia.    Wheezing/acute bronchospasm:   Urinary tract infection    Continue with supplemental oxygen.  Wean as tolerated    BiPAP as needed nightly    Continue with bronchodilators  Antibiotics  Prophylaxis  Supportive care      Bhupendra Carmichael MD  [facility]

## 2024-05-31 NOTE — PROGRESS NOTES
05/31/24 0851   Discharge Planning   Type of Post Acute Facility Services Long term care   Patient expects to be discharged to: LT - [facility] - Patient is a bedhold.  Can return when medically ready.   Does the patient need discharge transport arranged? Yes   RoundTrip coordination needed? Yes   Has discharge transport been arranged? No

## 2024-05-31 NOTE — CARE PLAN
The patient's goals for the shift include UTD    The clinical goals for the shift include Complete MRI safety form with family member by end of shift.      Problem: Discharge Planning  Goal: Discharge to home or other facility with appropriate resources  5/30/2024 2045 by Kristi Dailey RN  Outcome: Progressing  Flowsheets (Taken 5/30/2024 2045)  Discharge to home or other facility with appropriate resources:   Identify barriers to discharge with patient and caregiver   Arrange for needed discharge resources and transportation as appropriate   Identify discharge learning needs (meds, wound care, etc)   Arrange for interpreters to assist at discharge as needed   Refer to discharge planning if patient needs post-hospital services based on physician order or complex needs related to functional status, cognitive ability or social support system  5/30/2024 2044 by Kristi Dailey RN  Outcome: Progressing     Problem: Chronic Conditions and Co-morbidities  Goal: Patient's chronic conditions and co-morbidity symptoms are monitored and maintained or improved  5/30/2024 2045 by Kristi Dailey RN  Outcome: Progressing  Flowsheets (Taken 5/30/2024 2045)  Care Plan - Patient's Chronic Conditions and Co-Morbidity Symptoms are Monitored and Maintained or Improved:   Monitor and assess patient's chronic conditions and comorbid symptoms for stability, deterioration, or improvement   Update acute care plan with appropriate goals if chronic or comorbid symptoms are exacerbated and prevent overall improvement and discharge   Collaborate with multidisciplinary team to address chronic and comorbid conditions and prevent exacerbation or deterioration  5/30/2024 2044 by Kristi Dailey RN  Outcome: Progressing     Problem: Skin  Goal: Promote/optimize nutrition  5/30/2024 2045 by Kristi Dailey RN  Outcome: Progressing  Flowsheets (Taken 5/30/2024 2045)  Promote/optimize nutrition:   Assist with feeding    Monitor/record intake including meals   Consume > 50% meals/supplements   Reassess MST if dietician not consulted  5/30/2024 2044 by Kristi Dailey RN  Outcome: Progressing     Problem: General Stroke  Goal: Establish a mutual long term goal with patient by discharge  5/30/2024 2045 by Kristi Dailey RN  Outcome: Progressing  Flowsheets (Taken 5/30/2024 2045)  Establish a mutual long term goal with patient by discharge:   Attend medical follow-up appointments   Monitor blood pressure   Monitor blood glucose   Eat healthy   Engage in physical activity  5/30/2024 2044 by Kristi Dailey RN  Outcome: Progressing  Goal: Demonstrate improvement in neurological exam throughout the shift  5/30/2024 2045 by Kristi Dailey RN  Outcome: Progressing  5/30/2024 2044 by Kristi Dailey RN  Outcome: Progressing  Goal: Participate in treatment (ie., meds, therapy) throughout shift  5/30/2024 2045 by Kristi Dailey RN  Outcome: Progressing  5/30/2024 2044 by Kristi Dailey RN  Outcome: Progressing  Goal: Out of bed three times today  5/30/2024 2045 by Kristi Dailey RN  Outcome: Progressing  5/30/2024 2044 by Kristi Dailey RN  Outcome: Progressing     Problem: Respiratory  Goal: No signs of respiratory distress (eg. Use of accessory muscles. Peds grunting)  Outcome: Progressing

## 2024-05-31 NOTE — PROGRESS NOTES
Occupational Therapy    Occupational Therapy Treatment    Name: Morena Joshi  MRN: [id number]  : 1940  Date: 24  Time Calculation  Start Time: 1110  Stop Time: 1141  Time Calculation (min): 31 min    Assessment:  OT Assessment: Patient will continue to benefit from skilled OT.  Prognosis: Good  Barriers to Discharge: Decreased caregiver support (Dependent txfers and ADLs)  Evaluation/Treatment Tolerance: Patient limited by fatigue  End of Session Communication: Bedside nurse  End of Session Patient Position: Bed, 3 rail up, Alarm on  Plan:  Treatment Interventions: ADL retraining, Functional transfer training, Endurance training, UE strengthening/ROM, Compensatory technique education, Patient/family training  OT Frequency: 3 times per week  OT Discharge Recommendations: Moderate intensity level of continued care  Equipment Recommended upon Discharge: Lift  OT Recommended Transfer Status: Dependent  OT - OK to Discharge: Yes    Subjective   Previous Visit Info:  OT Last Visit  OT Received On: 24  General:  General  Reason for Referral: decline in ADLs  Referred By: Dr. Andino  Past Medical History Relevant to Rehab: COPD, HTN, HLD, Hypothyroidism  Family/Caregiver Present: No  Prior to Session Communication: Bedside nurse  Patient Position Received: Bed, 3 rail up, Alarm off, not on at start of session  Preferred Learning Style: verbal  General Comment: Patient is cleared by nursing for therapy. Patient in bed upon arrival and agreeable to participate  Precautions:  Medical Precautions: Fall precautions, Oxygen therapy device and L/min (4L O2 via NC)  Pain Assessment:  Pain Assessment  Pain Assessment: FLACC (Face, Legs, Activity, Cry, Consolability)  Pain Score: 5 - Moderate pain  Pain Type: Chronic pain  Pain Location: Generalized     Objective   Cognition:  Overall Cognitive Status: Impaired at baseline (confused, able to follow some simple motor commands)  Orientation Level: Disoriented to  time, Disoriented to situation  Safety/Judgement: Exceptions to WFL  Insight: Moderate  Impulsive: Mildly  Processing Speed: Delayed  Activities of Daily Living: Grooming  Grooming Level of Assistance: Setup, Close supervision  Grooming Where Assessed: Edge of bed  Grooming Comments: wash face    Toileting  Toileting Comments: purewick    Bed Mobility/Transfers: Bed Mobility 1  Bed Mobility 1: Supine to sitting  Level of Assistance 1: Maximum assistance, +2  Bed Mobility Comments 1: assist to raise trunk and move B LE  Bed Mobility 2  Bed Mobility  2: Sitting to supine  Level of Assistance 2: Maximum assistance, +2  Bed Mobility Comments 2: assist to raise B LE and support trunk  Bed Mobility 3  Bed Mobility 3: Scooting  Level of Assistance 3: Dependent, +2  Bed Mobility Comments 3: boost patient up in bed  Bed Mobility 4  Bed Mobility 4: Rolling right, Rolling left  Level of Assistance 4: Maximum assistance, +2    Sitting Balance:  Static Sitting Balance  Static Sitting-Balance Support: Feet supported, Bilateral upper extremity supported  Static Sitting-Level of Assistance: Close supervision, Moderate assistance  Static Sitting-Comment/Number of Minutes: varying levels of assist. 11 minutes    Therapy/Activity: Therapeutic Exercise  Therapeutic Exercise Performed: Yes  Therapeutic Exercise Activity 1: at bed level, patient completes 1x10 B UE AROM exercises. exercises include bicep curls, chest press, shoulder flex/ext, forearm supination/pronation, wrist flex/ext, and finger flex/ext. fair form and tolerance ntoed    RUE   RUE : Exceptions to WFL (generalized weakness) and LUE   LUE: Exceptions to WFL (generalized weakness)    Outcome Measures:  [facility] Daily Activity  Putting on and taking off regular lower body clothing: Total  Bathing (including washing, rinsing, drying): A lot  Putting on and taking off regular upper body clothing: A lot  Toileting, which includes using toilet, bedpan or urinal: Total  Taking  care of personal grooming such as brushing teeth: A lot  Eating Meals: A little  Daily Activity - Total Score: 11    Education Documentation  Body Mechanics, taught by Imelda Siddiqi OT at 5/31/2024 11:49 AM.  Learner: Patient  Readiness: Acceptance  Method: Explanation, Demonstration  Response: Needs Reinforcement    Precautions, taught by Imelda Siddiqi OT at 5/31/2024 11:49 AM.  Learner: Patient  Readiness: Acceptance  Method: Explanation, Demonstration  Response: Needs Reinforcement    Home Exercise Program, taught by Imelda Siddiqi OT at 5/31/2024 11:49 AM.  Learner: Patient  Readiness: Acceptance  Method: Explanation, Demonstration  Response: Needs Reinforcement    ADL Training, taught by Imelda Siddiqi OT at 5/31/2024 11:49 AM.  Learner: Patient  Readiness: Acceptance  Method: Explanation, Demonstration  Response: Needs Reinforcement    Education Comments  No comments found.      Goals:  Encounter Problems       Encounter Problems (Active)       OT Goals       UB ADLs (Progressing)       Start:  05/28/24    Expected End:  06/20/24       Patient will complete UB ADLs with Close Supervision, using AE as needed, in order to increase safety and independence with self-care tasks.         LB ADLs (Progressing)       Start:  05/28/24    Expected End:  06/20/24       Patient will complete LB ADLs with Close Supervision, using AE as needed, in order to increase safety and independence with self-care tasks.           B UE Strengthening (Progressing)       Start:  05/28/24    Expected End:  06/20/24       Patient will increase B UE strength to 3+/5 for functional transfers.         Functional Transfers (Progressing)       Start:  05/28/24    Expected End:  06/20/24       Patient will complete bed mobility tasks with Min A in order to increase patient's safety and independence with daily tasks.         Sitting Balance (Progressing)       Start:  05/28/24    Expected End:  06/20/24       Patient will demonstrate the  ability to sit EOB at least >/= 25 minutes with Fair+ balance for increased safety and independence with daily tasks.

## 2024-06-01 PROCEDURE — 2500000001 HC RX 250 WO HCPCS SELF ADMINISTERED DRUGS (ALT 637 FOR MEDICARE OP): Performed by: FAMILY MEDICINE

## 2024-06-01 PROCEDURE — 2500000001 HC RX 250 WO HCPCS SELF ADMINISTERED DRUGS (ALT 637 FOR MEDICARE OP): Performed by: INTERNAL MEDICINE

## 2024-06-01 PROCEDURE — 2060000001 HC INTERMEDIATE ICU ROOM DAILY

## 2024-06-01 PROCEDURE — 2500000005 HC RX 250 GENERAL PHARMACY W/O HCPCS: Performed by: INTERNAL MEDICINE

## 2024-06-01 RX ADMIN — NYSTATIN 1 APPLICATION: 100000 POWDER TOPICAL at 20:15

## 2024-06-01 RX ADMIN — NYSTATIN 1 APPLICATION: 100000 POWDER TOPICAL at 08:48

## 2024-06-01 RX ADMIN — HYDRALAZINE HYDROCHLORIDE 25 MG: 25 TABLET ORAL at 16:48

## 2024-06-01 RX ADMIN — HYDRALAZINE HYDROCHLORIDE 25 MG: 25 TABLET ORAL at 08:49

## 2024-06-01 RX ADMIN — Medication 4 L/MIN: at 08:00

## 2024-06-01 RX ADMIN — ASPIRIN 81 MG: 81 TABLET, COATED ORAL at 08:49

## 2024-06-01 RX ADMIN — ISOSORBIDE MONONITRATE 30 MG: 30 TABLET, EXTENDED RELEASE ORAL at 08:48

## 2024-06-01 RX ADMIN — ATORVASTATIN CALCIUM 40 MG: 40 TABLET, FILM COATED ORAL at 20:15

## 2024-06-01 RX ADMIN — Medication 4 L/MIN: at 20:00

## 2024-06-01 RX ADMIN — AMLODIPINE BESYLATE 10 MG: 10 TABLET ORAL at 08:49

## 2024-06-01 RX ADMIN — CARVEDILOL 6.25 MG: 6.25 TABLET, FILM COATED ORAL at 08:49

## 2024-06-01 RX ADMIN — CARVEDILOL 6.25 MG: 6.25 TABLET, FILM COATED ORAL at 16:49

## 2024-06-01 RX ADMIN — LEVOTHYROXINE SODIUM 100 MCG: 0.1 TABLET ORAL at 06:33

## 2024-06-01 RX ADMIN — HYDRALAZINE HYDROCHLORIDE 25 MG: 25 TABLET ORAL at 20:15

## 2024-06-01 RX ADMIN — DOCUSATE SODIUM 100 MG: 100 CAPSULE, LIQUID FILLED ORAL at 08:48

## 2024-06-01 ASSESSMENT — PAIN - FUNCTIONAL ASSESSMENT: PAIN_FUNCTIONAL_ASSESSMENT: 0-10

## 2024-06-01 ASSESSMENT — PAIN SCALES - GENERAL
PAINLEVEL_OUTOF10: 0 - NO PAIN
PAINLEVEL_OUTOF10: 0 - NO PAIN

## 2024-06-01 ASSESSMENT — PAIN SCALES - WONG BAKER: WONGBAKER_NUMERICALRESPONSE: NO HURT

## 2024-06-01 NOTE — CARE PLAN
The patient's goals for the shift include get good rest.     The clinical goals for the shift include maintain hemodynamic stability.     Over the shift, the patient did not make progress toward the following goals. Barriers to progression include none. Recommendations to address these barriers include n/a.

## 2024-06-01 NOTE — PROGRESS NOTES
Morena Joshi is a 84 y.o. female on day 7 of admission presenting with Somnolence.    Subjective   Patient was seen and examined.  Lying in the bed.  Comfortable.  Patient's breathing is slightly worse today.  Patient is on 5 L oxygen now.       Objective     Physical Exam  HEENT:  Head externally atraumatic,  extraocular movements intact, oral mucosa moist  Neck:  Supple, no JVP, no palpable adenopathy or thyromegaly.  No carotid bruit.  Chest:  Clear to auscultation and resonant.  Heart:  Regular rate and rhythm, no murmur or gallop could be appreciated.  Abdomen:  Soft, nontender, bowel sounds present, normoactive, no palpable hepatosplenomegaly.  Extremities:  No edema, pulses present, no cyanosis or clubbing.  CNS:  Patient alert, oriented to time, place and person.    No new deficit.  Cranial nerves 2-12 grossly intact  Skin:  No active rash.  Musculoskeletal:  No  apparent joint swelling or erythema, range of movement normal.  Last Recorded Vitals  Heart Rate:  [60-73]   Temp:  [36 °C (96.8 °F)-36.9 °C (98.4 °F)]   Resp:  [16-25]   BP: ()/(50-63)   Weight:  [93.5 kg (206 lb 2.1 oz)-93.7 kg (206 lb 9.1 oz)]   SpO2:  [91 %-96 %]     Intake/Output last 3 Shifts:  I/O last 3 completed shifts:  In: 250 (2.7 mL/kg) [P.O.:250]  Out: 700 (7.5 mL/kg) [Urine:700 (0.2 mL/kg/hr)]  Weight: 93.5 kg     Relevant Results  Susceptibility data from last 90 days.  Collected Specimen Info Organism Amoxicillin/Clavulanate Ampicillin Ampicillin/Sulbactam Cefazolin Cefazolin (uncomplicated UTIs only) Ciprofloxacin Gentamicin Nitrofurantoin Piperacillin/Tazobactam Trimethoprim/Sulfamethoxazole   05/25/24 Urine from Clean Catch/Voided Klebsiella pneumoniae/variicola  S  R  S  S  S  S  S  S  S  S     No results found for this or any previous visit (from the past 24 hour(s)).     Current Facility-Administered Medications:     acetaminophen (Tylenol) suppository 650 mg, 650 mg, rectal, q4h PRN, Alyssa Carlisle, DO, 650 mg at  05/27/24 0429    amLODIPine (Norvasc) tablet 10 mg, 10 mg, oral, Daily, Srinivas Andino MD, 10 mg at 06/01/24 0849    aspirin EC tablet 81 mg, 81 mg, oral, Daily, Rishi Casas MD, 81 mg at 06/01/24 0849    atorvastatin (Lipitor) tablet 40 mg, 40 mg, oral, Nightly, Srinivas Andino MD, 40 mg at 05/31/24 2030    bisacodyl (Dulcolax) suppository 10 mg, 10 mg, rectal, Daily PRN, Alyssa Carlisle DO    carvedilol (Coreg) tablet 6.25 mg, 6.25 mg, oral, BID, Sriinvas Andino MD, 6.25 mg at 06/01/24 1649    dextrose 50 % injection 25 g, 25 g, intravenous, Once, Srinivas Andino MD    docusate sodium (Colace) capsule 100 mg, 100 mg, oral, Daily, Srinivas Andino MD, 100 mg at 06/01/24 0848    hydrALAZINE (Apresoline) injection 10 mg, 10 mg, intravenous, q6h PRN, Srinivas Andino MD    hydrALAZINE (Apresoline) tablet 25 mg, 25 mg, oral, TID, Srinivas Andino MD, 25 mg at 06/01/24 1648    ipratropium-albuteroL (Duo-Neb) 0.5-2.5 mg/3 mL nebulizer solution 3 mL, 3 mL, nebulization, q4h PRN, Srinivas Andino MD    isosorbide mononitrate ER (Imdur) 24 hr tablet 30 mg, 30 mg, oral, Daily, Srinivas Andino MD, 30 mg at 06/01/24 0848    levothyroxine (Synthroid, Levoxyl) tablet 100 mcg, 100 mcg, oral, Daily, Srinivas Andino MD, 100 mcg at 06/01/24 0633    nystatin (Mycostatin) 100,000 unit/gram powder 1 Application, 1 Application, Topical, BID, Srinivas Andino MD, 1 Application at 06/01/24 0848    oxygen (O2) therapy, , inhalation, Continuous PRN - O2/gases, Srinivas Andino MD, 5 L/min at 05/25/24 1849    oxygen (O2) therapy, , inhalation, Continuous - Inhalation, Rishi Casas MD, 4 L/min at 06/01/24 0800   Assessment/Plan   Principal Problem:    Somnolence  Acute on chronic respiratory failure acute toxic metabolic encephalopathy  UTI  Klebsiella pneumonia  Acute metabolic encephalopathy  With the patient  Acute on chronic congestive heart failure  Acute renal failure    Plan: Continue current medication.  Supportive care.  Physical therapy and Occupational  Therapy.  Monitor CBC and BMP.  Increase activity.  Will treat DVT, fall, aspiration, decubitus, DVT precautions.  This has been discussed the patient and is agreeable to it.         Rishi Casas MD

## 2024-06-01 NOTE — PROGRESS NOTES
Pulmonary Progress Note 06/01/24     Patient seen in follow-up for altered mental status, acute on chronic respiratory failure with hypoxia    Subjective   Interval History:   He is signed out to me by Dr. Carmichael.  Patient has no new respiratory complaints.  Did not utilize BiPAP last night it seems    Objective   Vital signs in last 24 hours:  Temp:  [36 °C (96.8 °F)-36.9 °C (98.4 °F)] 36 °C (96.8 °F)  Heart Rate:  [66-74] 73  Resp:  [16-25] 20  BP: ()/(50-60) 118/55  FiO2 (%):  [36 %] 36 %    Intake/Output last 3 shifts:  I/O last 3 completed shifts:  In: 250 (2.7 mL/kg) [P.O.:250]  Out: 700 (7.5 mL/kg) [Urine:700 (0.2 mL/kg/hr)]  Weight: 93.5 kg   Intake/Output this shift:  No intake/output data recorded.    Physical Exam  Vitals reviewed.   Constitutional:       General: She is not in acute distress.     Interventions: Nasal cannula in place.   HENT:      Head: Normocephalic and atraumatic.   Cardiovascular:      Rate and Rhythm: Normal rate and regular rhythm.   Pulmonary:      Effort: Pulmonary effort is normal.      Breath sounds: No wheezing.   Musculoskeletal:      Right lower leg: Edema present.      Left lower leg: Edema present.   Skin:     General: Skin is warm.   Neurological:      Mental Status: She is alert. Mental status is at baseline.   Psychiatric:         Behavior: Behavior normal.         Scheduled medications  amLODIPine, 10 mg, oral, Daily  aspirin, 81 mg, oral, Daily  atorvastatin, 40 mg, oral, Nightly  carvedilol, 6.25 mg, oral, BID  dextrose, 25 g, intravenous, Once  docusate sodium, 100 mg, oral, Daily  hydrALAZINE, 25 mg, oral, TID  isosorbide mononitrate ER, 30 mg, oral, Daily  levothyroxine, 100 mcg, oral, Daily  nystatin, 1 Application, Topical, BID  oxygen, , inhalation, Continuous - Inhalation      Continuous medications     PRN medications  PRN medications: acetaminophen, bisacodyl, hydrALAZINE, ipratropium-albuteroL, oxygen     Labs:  No new labs, most recent  labs showed sodium of 146 on 5/29/2024  Urinalysis notable for urinary tract infection  Urine culture positive for Klebsiella pneumonia    Imaging:  MR brain wo IV contrast    Result Date: 6/1/2024  Interpreted By:  Hari Vora, STUDY: MR BRAIN WO IV CONTRAST;  5/31/2024 6:08 pm   INDICATION: Signs/Symptoms:CVA.   COMPARISON: CT of the head dated 05/25/2024   ACCESSION NUMBER(S): [id number]   ORDERING CLINICIAN: ANTONIO FOX   TECHNIQUE: Axial diffusion, axial T2, axial FLAIR, axial gradient echo T2, coronal T1, and sagittal T1 weighted MRI images of the brain were obtained without intravenous contrast administration.   FINDINGS: The study is degraded by motion.   The diffusion weighted images fail to demonstrate evidence of abnormal diffusion restriction to suggest acute infarction.   There is moderate brain parenchymal volume loss.   There are scattered as well as more patchy and confluent nonspecific white matter changes within the cerebral hemispheres bilaterally as well as ill-defined increased signal on the FLAIR and T2 weighted images overlying the brainstem which while nonspecific, given the patient's age, likely represent sequelae of more remote small-vessel ischemic change.   Additional scattered foci of bright signal on the T2 weighted images are identified within the subinsular regions, basal ganglia, internal capsules, and thalami bilaterally suggesting scattered more remote lacunar infarctions as well as incidental mildly prominent perivascular spaces.   There is no midline shift. The suprasellar/basilar cisterns are patent.   There is opacification of a posterior right ethmoid air cell. Minimal mucosal thickening is noted within additional scattered ethmoid air cells and the inferior maxillary sinuses.   There is opacification of scattered mastoid air cells bilaterally.       There is no MRI evidence of acute infarction on the diffusion weighted images.   There is moderate brain parenchymal  volume loss.   There are scattered as well as more patchy and confluent nonspecific white matter changes within the cerebral hemispheres bilaterally as well as ill-defined increased signal on the FLAIR and T2 weighted images overlying the brainstem which while nonspecific, given the patient's age, likely represent sequelae of more remote small-vessel ischemic change. Additional scattered foci of bright signal on the T2 weighted images are identified within the subinsular regions, basal ganglia, internal capsules, and thalami bilaterally suggesting scattered more remote lacunar infarctions as well as incidental mildly prominent perivascular spaces.   MACRO: None.   Signed by: Hari Vora 6/1/2024 7:56 AM Dictation workstation:   [id number]    ECG 12 lead    Result Date: 5/29/2024  Atrial fibrillation with slow ventricular response with premature ventricular or aberrantly conducted complexes Rightward axis Abnormal ECG No previous ECGs available    Transthoracic Echo (TTE) Complete    Result Date: 5/28/2024            [facility] [address], [place] [address]             Phone [phone] TRANSTHORACIC ECHOCARDIOGRAM REPORT  Patient Name:     JR Rob Physician:   [id number] Simon Mensah MD Study Date:       5/28/2024            Ordering Provider:   [id number] ANTONIO FOX MRN/PID:          [id number]             Fellow: Accession#:       [id number]         Nurse: Date of           1940 / 84 years Sonographer:         Poly Antony RDCS Birth/Age: Gender:           F                    Additional Staff: Height:           149.00 cm            Admit Date: Weight:           91.00 kg             Admission Status:    Inpatient - Routine BSA / BMI:        1.84 m2 / 40.99      Department Location: [facility]                   kg/m2 Blood Pressure: 115 /48 mmHg Study Type:    TRANSTHORACIC ECHO (TTE) COMPLETE Diagnosis/ICD:  Cerebral Infarction, unspecified-I63.9 Indication:    stroke CPT Codes:     Echo Complete w Full [id number] Patient History: Smoker:            Former. BMI:               Obese >30 Pertinent History: CVA. somnolece,ams,bmi 40,. Study Detail: The following Echo studies were performed: 2D, Doppler and color               flow. Technically challenging study due to prominent lung               artifact, body habitus, patient lying in supine position and poor               acoustic windows. Agitated saline used as a contrast agent for               intraseptal flow evaluation.  PHYSICIAN INTERPRETATION: Left Ventricle: Left ventricular systolic function is normal, with an estimated ejection fraction of 55-60%. There are no regional wall motion abnormalities. The left ventricular cavity size is normal. Spectral Doppler shows an impaired relaxation pattern of left ventricular diastolic filling. Left Atrium: The left atrium is moderately dilated. There is no evidence of a patent foramen ovale. There is no shunt detected. There is no atrial septal defect present. Right Ventricle: The right ventricle is upper limits of normal in size. There is normal right ventricular global systolic function. Right Atrium: The right atrium is upper limits of normal in size. Aortic Valve: The aortic valve is probably trileaflet. There is trivial aortic valve regurgitation. The peak instantaneous gradient of the aortic valve is 3.8 mmHg. The mean gradient of the aortic valve is 2.0 mmHg. Mitral Valve: The mitral valve is mildly thickened. There is mild calcification of the posterior mitral valve leaflet. There is mild to moderate mitral annular calcification. There is trace mitral valve regurgitation which is eccentrically directed. Tricuspid Valve: The tricuspid valve was not well visualized. There is moderate tricuspid regurgitation. Pulmonic Valve: The pulmonic valve is structurally normal. There is trace pulmonic valve regurgitation.  Pericardium: There is no pericardial effusion noted. Aorta: The aortic root was not well visualized.  CONCLUSIONS:  1. Left ventricular systolic function is normal with a 55-60% estimated ejection fraction.  2. Spectral Doppler shows an impaired relaxation pattern of left ventricular diastolic filling.  3. The left atrium is moderately dilated.  4. Moderate tricuspid regurgitation.  5. There is no evidence of a patent foramen ovale. QUANTITATIVE DATA SUMMARY: 2D MEASUREMENTS:                          Normal Ranges: LAs:           3.00 cm   (2.7-4.0cm) IVSd:          0.73 cm   (0.6-1.1cm) LVPWd:         0.79 cm   (0.6-1.1cm) LVIDd:         5.00 cm   (3.9-5.9cm) LV Mass Index: 69.4 g/m2 LV SYSTOLIC FUNCTION BY 2D PLANIMETRY (MOD):                     Normal Ranges: EF-A4C View: 53.7 % (>=55%) LV DIASTOLIC FUNCTION:                        Normal Ranges: MV Peak E:    1.02 m/s (0.7-1.2 m/s) MV e'         0.01 m/s (>8.0) MV lateral e' 0.10 m/s MV medial e'  0.07 m/s E/e' Ratio:   102.00   (<8.0) a'            0.07 m/s MITRAL VALVE:                 Normal Ranges: MV DT: 161 msec (150-240msec) AORTIC VALVE:                                   Normal Ranges: AoV Vmax:                0.98 m/s (<=1.7m/s) AoV Peak PG:             3.8 mmHg (<20mmHg) AoV Mean P.0 mmHg (1.7-11.5mmHg) LVOT Max Zane:            0.63 m/s (<=1.1m/s) AoV VTI:                 21.70 cm (18-25cm) LVOT VTI:                11.40 cm LVOT Diameter:           1.90 cm  (1.8-2.4cm) AoV Area, VTI:           1.49 cm2 (2.5-5.5cm2) AoV Area,Vmax:           1.82 cm2 (2.5-4.5cm2) AoV Dimensionless Index: 0.53  RIGHT VENTRICLE: RV s' 0.11 m/s TRICUSPID VALVE/RVSP:                             Normal Ranges: Peak TR Velocity: 3.30 m/s RV Syst Pressure: 58.6 mmHg (< 30mmHg) IVC Diam:         2.78 cm PULMONIC VALVE:                         Normal Ranges: PV Accel Time: 82 msec  (>120ms) PV Max Zane:    0.8 m/s  (0.6-0.9m/s) PV Max P.3 mmHg  [id number]  Simon Mensah MD Electronically signed on 5/28/2024 at 4:14:00 PM  ** Final **     Carotid duplex bilateral    Result Date: 5/26/2024  Interpreted By:  Jose Miguel Smith, STUDY: [facility] US CAROTID ARTERY DUPLEX BILATERAL;  5/25/2024 7:55 pm   INDICATION: Signs/Symptoms:CVA.   COMPARISON: None.   ACCESSION NUMBER(S): [id number]   ORDERING CLINICIAN: ANTONIO FOX   TECHNIQUE: Vascular ultrasound of the extracranial carotid system was performed bilaterally.  Gray scale, color Doppler and spectral Doppler waveform analysis was performed.   FINDINGS: RIGHT: On the right,  there is scattered atherosclerotic plaque. Otherwise there is antegrade flow with expected arterial waveforms. The peak systolic velocities are as follows:   RIGHT SIDE PEAK SYSTOLIC/END-DIASTOLIC VELOCITY TABLE: CCA 83/23 cm/sec. /149 cm/sec. ECA 77 cm/sec.   The ratio of the peak systolic velocity of the right ICA/CCA is 4.1.   RIGHT VERTEBRAL ARTERY: The right vertebral artery is patent with normal antegrade blood flow and peak systolic velocity measuring  82 cm/sec.   LEFT: On the left,  there is scattered atherosclerotic plaque. The patient did not lot allow scanning of the entire left carotid system. The peak systolic velocities are as follows:   LEFT SIDE PEAK SYSTOLIC/END-DIASTOLIC VELOCITY TABLE: CCA 95/27 cm/sec. ICA not obtained ECA not obtain   LEFT VERTEBRAL ARTERY: Not scanned..       1. Hemodynamic changes suggesting a stenosis of greater than 70% in the right internal carotid artery. 2. Antegrade flow in the right vertebral artery. 3. The patient did not allow scanning of the left internal carotid and vertebral arteries.   Erin HL, Soumya T, Deonte H, et al. Optimization of duplex velocity criteria for diagnosis of internal carotid artery (ICA) stenosis: A report of the [employer]reditation Commission (IAC) Vascular Testing Division Carotid Diagnostic Criteria Committee. Vascular Medicine. 2021;26(5):515-525   Signed by: Jose Miugel  Smith 5/26/2024 10:17 AM Dictation workstation:   [id number]    CT head wo IV contrast    Result Date: 5/25/2024  Interpreted By:  Sreekanth Nguyen, STUDY: CT HEAD WO IV CONTRAST;  5/25/2024 2:09 pm   INDICATION: Signs/Symptoms:altered mental status.   COMPARISON: Prior exam from 11/18/2022..   ACCESSION NUMBER(S): [id number]   ORDERING CLINICIAN: JHONATAN PADILLA   TECHNIQUE: Routine axial images were obtained from the skull base through the vertex.  Sagittal and coronal reconstruction images were generated. Brain, subdural, and bone windows were reviewed. N/A Unavailable   FINDINGS: INTRACRANIAL: Mild prominence of ventricles and sulci. There is moderate patchy hypodensity throughout the deep periventricular white matter. No acute intracranial bleed, midline shift, or focal mass effect. There is however a subtle small area of hypodensity involving cortex and white matter in the lateral posterior right frontal lobe on axial images 40 through 43/78. This was not present previously. No destructive bone lesion. No depressed skull fracture. Skullbase arterial calcifications in the carotid siphons and vertebral arteries.   EXTRACRANIAL: There is mild inflammatory material posteriorly in the right sphenoid sinus. Otherwise, the visualized paranasal sinuses were clear. Visualized mastoid air cells were clear.       There is a subtle area of hypodensity peripherally in the posterolateral right frontal lobe as described. This could be acute or subacute nonhemorrhagic infarct. This was not present previously. Suggest further evaluation with MR I.   No acute intracranial bleed. No midline shift.   Mild volume loss.   Moderate chronic white matter ischemic disease in the deep periventricular regions.   Mild acute right sphenoid sinusitis.   MACRO: Sreekanth Nguyen discussed the significance and urgency of this critical finding by epic secure chat with  JHONATAN PADILLA on 5/25/2024 at 2:27 pm.  (**-RCF-**) Findings:  See  findings.   Signed by: Sreekanth Nguyen 5/25/2024 2:28 PM Dictation workstation:   [id number]    XR chest 1 view    Result Date: 5/25/2024  Interpreted By:  Jose Miguel Smith, STUDY: XR CHEST 1 VIEW;  5/25/2024 1:14 pm   INDICATION: Signs/Symptoms:confusion.   COMPARISON: 03/09/2023   ACCESSION NUMBER(S): [id number]   ORDERING CLINICIAN: JHONATAN PADILLA   FINDINGS: Scattered bilateral airspace opacities. Pleural effusions not excluded. Cardiomegaly suspected. Aortic atherosclerosis.       Scattered bilateral infiltrates or edema.   MACRO: None   Signed by: Jose Miguel Smith 5/25/2024 1:17 PM Dictation workstation:   [id number]              Assessment/Plan   Principal Problem:    Somnolence    Acute on chronic respiratory failure with hypoxia and hypercarbia: Resolved to resolving.  Likely in the setting of urinary tract infection and hypoventilation.  Was wheezing on admission per my review of notes but no longer  Continue with supplemental oxygen  Encourage BiPAP as needed  Toxic metabolic encephalopathy: Improving  Continue to trend  Acute bronchospasm  Continue with bronchodilators  Klebsiella pneumonia species in urine: Colonizer versus pathogen..  She got ceftriaxone on 5/25/2024 and has overall improved it seems without need for further antibiotics  Follow-up to susceptibility      Will see next again on Monday       LOS: 7 days       Aman Elder MD  Pulmonary/Critical Care medicine

## 2024-06-02 LAB
ANION GAP SERPL CALC-SCNC: 6 MMOL/L
BASOPHILS # BLD AUTO: 0.06 X10*3/UL (ref 0–0.1)
BASOPHILS NFR BLD AUTO: 0.7 %
BUN SERPL-MCNC: 30 MG/DL (ref 8–25)
CALCIUM SERPL-MCNC: 9.1 MG/DL (ref 8.5–10.4)
CHLORIDE SERPL-SCNC: 100 MMOL/L (ref 97–107)
CO2 SERPL-SCNC: 35 MMOL/L (ref 24–31)
CREAT SERPL-MCNC: 1 MG/DL (ref 0.4–1.6)
EGFRCR SERPLBLD CKD-EPI 2021: 56 ML/MIN/1.73M*2
EOSINOPHIL # BLD AUTO: 0.39 X10*3/UL (ref 0–0.4)
EOSINOPHIL NFR BLD AUTO: 4.8 %
ERYTHROCYTE [DISTWIDTH] IN BLOOD BY AUTOMATED COUNT: 13.8 % (ref 11.5–14.5)
GLUCOSE SERPL-MCNC: 85 MG/DL (ref 65–99)
HCT VFR BLD AUTO: 35.2 % (ref 36–46)
HGB BLD-MCNC: 10 G/DL (ref 12–16)
IMM GRANULOCYTES # BLD AUTO: 0.02 X10*3/UL (ref 0–0.5)
IMM GRANULOCYTES NFR BLD AUTO: 0.2 % (ref 0–0.9)
LYMPHOCYTES # BLD AUTO: 1.6 X10*3/UL (ref 0.8–3)
LYMPHOCYTES NFR BLD AUTO: 19.5 %
MCH RBC QN AUTO: 26.2 PG (ref 26–34)
MCHC RBC AUTO-ENTMCNC: 28.4 G/DL (ref 32–36)
MCV RBC AUTO: 92 FL (ref 80–100)
MONOCYTES # BLD AUTO: 1.02 X10*3/UL (ref 0.05–0.8)
MONOCYTES NFR BLD AUTO: 12.5 %
NEUTROPHILS # BLD AUTO: 5.1 X10*3/UL (ref 1.6–5.5)
NEUTROPHILS NFR BLD AUTO: 62.3 %
NRBC BLD-RTO: 0 /100 WBCS (ref 0–0)
PLATELET # BLD AUTO: 232 X10*3/UL (ref 150–450)
POTASSIUM SERPL-SCNC: 4.5 MMOL/L (ref 3.4–5.1)
RBC # BLD AUTO: 3.81 X10*6/UL (ref 4–5.2)
SODIUM SERPL-SCNC: 141 MMOL/L (ref 133–145)
WBC # BLD AUTO: 8.2 X10*3/UL (ref 4.4–11.3)

## 2024-06-02 PROCEDURE — 2500000005 HC RX 250 GENERAL PHARMACY W/O HCPCS: Performed by: INTERNAL MEDICINE

## 2024-06-02 PROCEDURE — 2500000001 HC RX 250 WO HCPCS SELF ADMINISTERED DRUGS (ALT 637 FOR MEDICARE OP): Performed by: FAMILY MEDICINE

## 2024-06-02 PROCEDURE — 85025 COMPLETE CBC W/AUTO DIFF WBC: CPT | Performed by: INTERNAL MEDICINE

## 2024-06-02 PROCEDURE — 2060000001 HC INTERMEDIATE ICU ROOM DAILY

## 2024-06-02 PROCEDURE — 36415 COLL VENOUS BLD VENIPUNCTURE: CPT | Performed by: INTERNAL MEDICINE

## 2024-06-02 PROCEDURE — 80048 BASIC METABOLIC PNL TOTAL CA: CPT | Performed by: INTERNAL MEDICINE

## 2024-06-02 PROCEDURE — 94660 CPAP INITIATION&MGMT: CPT

## 2024-06-02 PROCEDURE — 2500000001 HC RX 250 WO HCPCS SELF ADMINISTERED DRUGS (ALT 637 FOR MEDICARE OP): Performed by: INTERNAL MEDICINE

## 2024-06-02 RX ADMIN — HYDRALAZINE HYDROCHLORIDE 25 MG: 25 TABLET ORAL at 15:42

## 2024-06-02 RX ADMIN — DOCUSATE SODIUM 100 MG: 100 CAPSULE, LIQUID FILLED ORAL at 08:24

## 2024-06-02 RX ADMIN — CARVEDILOL 6.25 MG: 6.25 TABLET, FILM COATED ORAL at 17:40

## 2024-06-02 RX ADMIN — Medication 5 L/MIN: at 20:00

## 2024-06-02 RX ADMIN — AMLODIPINE BESYLATE 10 MG: 10 TABLET ORAL at 08:24

## 2024-06-02 RX ADMIN — Medication 36 PERCENT: at 08:00

## 2024-06-02 RX ADMIN — ATORVASTATIN CALCIUM 40 MG: 40 TABLET, FILM COATED ORAL at 20:47

## 2024-06-02 RX ADMIN — ASPIRIN 81 MG: 81 TABLET, COATED ORAL at 08:24

## 2024-06-02 RX ADMIN — NYSTATIN 1 APPLICATION: 100000 POWDER TOPICAL at 20:47

## 2024-06-02 RX ADMIN — NYSTATIN 1 APPLICATION: 100000 POWDER TOPICAL at 08:42

## 2024-06-02 RX ADMIN — LEVOTHYROXINE SODIUM 100 MCG: 0.1 TABLET ORAL at 06:06

## 2024-06-02 RX ADMIN — CARVEDILOL 6.25 MG: 6.25 TABLET, FILM COATED ORAL at 08:24

## 2024-06-02 ASSESSMENT — PAIN - FUNCTIONAL ASSESSMENT
PAIN_FUNCTIONAL_ASSESSMENT: 0-10

## 2024-06-02 ASSESSMENT — PAIN SCALES - GENERAL
PAINLEVEL_OUTOF10: 0 - NO PAIN

## 2024-06-02 NOTE — NURSING NOTE
Evaluation of patients oxygen noted that yesterday patient was on 4Lpm spo2 92% today patient on 5Lpm spo2 89-90%. Placed patient on bipap to help improve oxygenation and deep breathing since patient has shallow breaths.

## 2024-06-02 NOTE — PROGRESS NOTES
Morena Joshi is a 84 y.o. female on day 8 of admission presenting with Somnolence.    Subjective   The patient was seen and examined.  Lying in the bed.  Comfortable.  Did not look in acute distress.  Patient denied any headache or dizziness.  No nausea or vomiting.       Objective     Physical Exam  HEENT:  Head externally atraumatic,  extraocular movements intact, oral mucosa moist  Neck:  Supple, no JVP, no palpable adenopathy or thyromegaly.  No carotid bruit.  Chest:  Clear to auscultation and resonant.  Heart:  Regular rate and rhythm, no murmur or gallop could be appreciated.  Abdomen:  Soft, nontender, bowel sounds present, normoactive, no palpable hepatosplenomegaly.  Extremities:  No edema, pulses present, no cyanosis or clubbing.  CNS:  Patient alert, oriented to time, place and person.    No new deficit.  Cranial nerves 2-12 grossly intact  Skin:  No active rash.  Musculoskeletal:  No  apparent joint swelling or erythema, range of movement normal.  Last Recorded Vitals  Heart Rate:  [63-74]   Temp:  [36 °C (96.8 °F)-36.5 °C (97.7 °F)]   Resp:  [16-25]   BP: (110-131)/(47-61)   SpO2:  [91 %-97 %]     Intake/Output last 3 Shifts:  I/O last 3 completed shifts:  In: - (0 mL/kg)   Out: 1100 (11.7 mL/kg) [Urine:1100 (0.3 mL/kg/hr)]  Weight: 93.7 kg     Relevant Results  Susceptibility data from last 90 days.  Collected Specimen Info Organism Amoxicillin/Clavulanate Ampicillin Ampicillin/Sulbactam Cefazolin Cefazolin (uncomplicated UTIs only) Ciprofloxacin Gentamicin Nitrofurantoin Piperacillin/Tazobactam Trimethoprim/Sulfamethoxazole   05/25/24 Urine from Clean Catch/Voided Klebsiella pneumoniae/variicola  S  R  S  S  S  S  S  S  S  S     Results for orders placed or performed during the hospital encounter of 05/25/24 (from the past 24 hour(s))   CBC and Auto Differential   Result Value Ref Range    WBC 8.2 4.4 - 11.3 x10*3/uL    nRBC 0.0 0.0 - 0.0 /100 WBCs    RBC 3.81 (L) 4.00 - 5.20 x10*6/uL     Hemoglobin 10.0 (L) 12.0 - 16.0 g/dL    Hematocrit 35.2 (L) 36.0 - 46.0 %    MCV 92 80 - 100 fL    MCH 26.2 26.0 - 34.0 pg    MCHC 28.4 (L) 32.0 - 36.0 g/dL    RDW 13.8 11.5 - 14.5 %    Platelets 232 150 - 450 x10*3/uL    Neutrophils % 62.3 40.0 - 80.0 %    Immature Granulocytes %, Automated 0.2 0.0 - 0.9 %    Lymphocytes % 19.5 13.0 - 44.0 %    Monocytes % 12.5 2.0 - 10.0 %    Eosinophils % 4.8 0.0 - 6.0 %    Basophils % 0.7 0.0 - 2.0 %    Neutrophils Absolute 5.10 1.60 - 5.50 x10*3/uL    Immature Granulocytes Absolute, Automated 0.02 0.00 - 0.50 x10*3/uL    Lymphocytes Absolute 1.60 0.80 - 3.00 x10*3/uL    Monocytes Absolute 1.02 (H) 0.05 - 0.80 x10*3/uL    Eosinophils Absolute 0.39 0.00 - 0.40 x10*3/uL    Basophils Absolute 0.06 0.00 - 0.10 x10*3/uL   Basic Metabolic Panel   Result Value Ref Range    Glucose 85 65 - 99 mg/dL    Sodium 141 133 - 145 mmol/L    Potassium 4.5 3.4 - 5.1 mmol/L    Chloride 100 97 - 107 mmol/L    Bicarbonate 35 (H) 24 - 31 mmol/L    Urea Nitrogen 30 (H) 8 - 25 mg/dL    Creatinine 1.00 0.40 - 1.60 mg/dL    eGFR 56 (L) >60 mL/min/1.73m*2    Calcium 9.1 8.5 - 10.4 mg/dL    Anion Gap 6 <=19 mmol/L        Current Facility-Administered Medications:     acetaminophen (Tylenol) suppository 650 mg, 650 mg, rectal, q4h PRN, Alyssa Carlisle DO, 650 mg at 05/27/24 0429    amLODIPine (Norvasc) tablet 10 mg, 10 mg, oral, Daily, Srinivas Andino MD, 10 mg at 06/02/24 0824    aspirin EC tablet 81 mg, 81 mg, oral, Daily, Rishi Casas MD, 81 mg at 06/02/24 0824    atorvastatin (Lipitor) tablet 40 mg, 40 mg, oral, Nightly, Srinivas Andino MD, 40 mg at 06/01/24 2015    bisacodyl (Dulcolax) suppository 10 mg, 10 mg, rectal, Daily PRN, Alyssa Carlisle DO    carvedilol (Coreg) tablet 6.25 mg, 6.25 mg, oral, BID, Srinivas Andino MD, 6.25 mg at 06/02/24 0824    dextrose 50 % injection 25 g, 25 g, intravenous, Once, Srinivas Andino MD    docusate sodium (Colace) capsule 100 mg, 100 mg, oral, Daily, Srinivas Andino,  MD, 100 mg at 06/02/24 0824    hydrALAZINE (Apresoline) injection 10 mg, 10 mg, intravenous, q6h PRN, Srinivas Andino MD    hydrALAZINE (Apresoline) tablet 25 mg, 25 mg, oral, TID, Srinivas Andino MD, 25 mg at 06/01/24 2015    ipratropium-albuteroL (Duo-Neb) 0.5-2.5 mg/3 mL nebulizer solution 3 mL, 3 mL, nebulization, q4h PRN, Srinivas Andino MD    isosorbide mononitrate ER (Imdur) 24 hr tablet 30 mg, 30 mg, oral, Daily, Srinivas Andino MD, 30 mg at 06/01/24 0848    levothyroxine (Synthroid, Levoxyl) tablet 100 mcg, 100 mcg, oral, Daily, Srinivas Andino MD, 100 mcg at 06/02/24 0606    nystatin (Mycostatin) 100,000 unit/gram powder 1 Application, 1 Application, Topical, BID, Srinivas Andino MD, 1 Application at 06/02/24 0842    oxygen (O2) therapy, , inhalation, Continuous PRN - O2/gases, Srinivas Andino MD, 5 L/min at 05/25/24 1849    oxygen (O2) therapy, , inhalation, Continuous - Inhalation, Rishi Casas MD, 36 percent at 06/02/24 0800   Assessment/Plan   Principal Problem:    Somnolence  Pneumonia  Acute respiratory failure  Congestive heart failure  UTI  Sepsis  Acute bronchospasm    Plan: Continue current medication.  Supportive care.  Continue with antibiotic and continue aerosol treatment.  Titrate down oxygen as tolerated.  Monitor input and output.  We will treat DVT, fall, aspiration, decubitus and DVT precautions.         Rishi Caass MD

## 2024-06-03 PROBLEM — R40.0 SOMNOLENCE: Status: RESOLVED | Noted: 2024-05-25 | Resolved: 2024-06-03

## 2024-06-03 LAB
ATRIAL RATE: 267 BPM
Q ONSET: 218 MS
QRS COUNT: 9 BEATS
QRS DURATION: 96 MS
QT INTERVAL: 448 MS
QTC CALCULATION(BAZETT): 420 MS
QTC FREDERICIA: 430 MS
R AXIS: 102 DEGREES
T AXIS: 21 DEGREES
T OFFSET: 442 MS
VENTRICULAR RATE: 53 BPM

## 2024-06-03 PROCEDURE — 97110 THERAPEUTIC EXERCISES: CPT | Mod: GO

## 2024-06-03 PROCEDURE — 97530 THERAPEUTIC ACTIVITIES: CPT | Mod: GP

## 2024-06-03 PROCEDURE — 2060000001 HC INTERMEDIATE ICU ROOM DAILY

## 2024-06-03 PROCEDURE — 97110 THERAPEUTIC EXERCISES: CPT | Mod: GP

## 2024-06-03 PROCEDURE — 2500000001 HC RX 250 WO HCPCS SELF ADMINISTERED DRUGS (ALT 637 FOR MEDICARE OP): Performed by: INTERNAL MEDICINE

## 2024-06-03 PROCEDURE — 94660 CPAP INITIATION&MGMT: CPT

## 2024-06-03 PROCEDURE — 2500000005 HC RX 250 GENERAL PHARMACY W/O HCPCS: Performed by: INTERNAL MEDICINE

## 2024-06-03 PROCEDURE — 2500000001 HC RX 250 WO HCPCS SELF ADMINISTERED DRUGS (ALT 637 FOR MEDICARE OP): Performed by: FAMILY MEDICINE

## 2024-06-03 PROCEDURE — 97535 SELF CARE MNGMENT TRAINING: CPT | Mod: GO

## 2024-06-03 RX ORDER — HYDRALAZINE HYDROCHLORIDE 20 MG/ML
10 INJECTION INTRAMUSCULAR; INTRAVENOUS EVERY 6 HOURS PRN
Start: 2024-06-03

## 2024-06-03 RX ORDER — ASPIRIN 81 MG/1
81 TABLET ORAL DAILY
Start: 2024-06-04

## 2024-06-03 RX ORDER — ATORVASTATIN CALCIUM 40 MG/1
40 TABLET, FILM COATED ORAL NIGHTLY
Start: 2024-06-03

## 2024-06-03 RX ORDER — BISACODYL 10 MG/1
10 SUPPOSITORY RECTAL DAILY PRN
Start: 2024-06-03

## 2024-06-03 RX ADMIN — LEVOTHYROXINE SODIUM 100 MCG: 0.1 TABLET ORAL at 05:40

## 2024-06-03 RX ADMIN — CARVEDILOL 6.25 MG: 6.25 TABLET, FILM COATED ORAL at 08:04

## 2024-06-03 RX ADMIN — ASPIRIN 81 MG: 81 TABLET, COATED ORAL at 08:04

## 2024-06-03 RX ADMIN — ISOSORBIDE MONONITRATE 30 MG: 30 TABLET, EXTENDED RELEASE ORAL at 08:05

## 2024-06-03 RX ADMIN — Medication 6 L/MIN: at 20:00

## 2024-06-03 RX ADMIN — Medication 40 PERCENT: at 08:00

## 2024-06-03 RX ADMIN — ATORVASTATIN CALCIUM 40 MG: 40 TABLET, FILM COATED ORAL at 21:12

## 2024-06-03 RX ADMIN — HYDRALAZINE HYDROCHLORIDE 25 MG: 25 TABLET ORAL at 21:12

## 2024-06-03 RX ADMIN — AMLODIPINE BESYLATE 10 MG: 10 TABLET ORAL at 08:04

## 2024-06-03 RX ADMIN — CARVEDILOL 6.25 MG: 6.25 TABLET, FILM COATED ORAL at 17:35

## 2024-06-03 RX ADMIN — HYDRALAZINE HYDROCHLORIDE 25 MG: 25 TABLET ORAL at 08:04

## 2024-06-03 RX ADMIN — DOCUSATE SODIUM 100 MG: 100 CAPSULE, LIQUID FILLED ORAL at 08:04

## 2024-06-03 RX ADMIN — NYSTATIN 1 APPLICATION: 100000 POWDER TOPICAL at 21:13

## 2024-06-03 ASSESSMENT — PAIN - FUNCTIONAL ASSESSMENT
PAIN_FUNCTIONAL_ASSESSMENT: 0-10

## 2024-06-03 ASSESSMENT — COGNITIVE AND FUNCTIONAL STATUS - GENERAL
PERSONAL GROOMING: A LITTLE
TOILETING: TOTAL
EATING MEALS: A LITTLE
MOBILITY SCORE: 6
MOVING FROM LYING ON BACK TO SITTING ON SIDE OF FLAT BED WITH BEDRAILS: TOTAL
DAILY ACTIVITIY SCORE: 12
STANDING UP FROM CHAIR USING ARMS: TOTAL
CLIMB 3 TO 5 STEPS WITH RAILING: TOTAL
WALKING IN HOSPITAL ROOM: TOTAL
DRESSING REGULAR LOWER BODY CLOTHING: TOTAL
HELP NEEDED FOR BATHING: A LOT
MOVING TO AND FROM BED TO CHAIR: TOTAL
TURNING FROM BACK TO SIDE WHILE IN FLAT BAD: TOTAL
DRESSING REGULAR UPPER BODY CLOTHING: A LOT

## 2024-06-03 ASSESSMENT — PAIN SCALES - GENERAL
PAINLEVEL_OUTOF10: 2
PAINLEVEL_OUTOF10: 0 - NO PAIN

## 2024-06-03 ASSESSMENT — ACTIVITIES OF DAILY LIVING (ADL): HOME_MANAGEMENT_TIME_ENTRY: 10

## 2024-06-03 NOTE — PROGRESS NOTES
Morena Joshi is a 84 y.o. female on day 9 of admission presenting with Somnolence.    Subjective   Patient was seen and examined.  Lying in the bed.  Comfortable.  Not in acute distress.  The patient denies any headache, dizziness, nausea or vomiting.      Objective     Physical Exam  HEENT:  Head externally atraumatic,  extraocular movements intact, oral mucosa moist  Neck:  Supple, no JVP, no palpable adenopathy or thyromegaly.  No carotid bruit.  Chest:  Clear to auscultation and resonant.  Heart:  Regular rate and rhythm, no murmur or gallop could be appreciated.  Abdomen:  Soft, nontender, bowel sounds present, normoactive, no palpable hepatosplenomegaly.  Extremities: Bilateral edema present, pulses present, no cyanosis or clubbing.  CNS:  Patient alert, oriented to time, place and person.    No new deficit.  Cranial nerves 2-12 grossly intact  Skin:  No active rash.  Musculoskeletal:  No  apparent joint swelling or erythema, range of movement normal.  Last Recorded Vitals  Heart Rate:  [64-84]   Temp:  [36.1 °C (97 °F)-37 °C (98.6 °F)]   Resp:  [19-28]   BP: (109-138)/(38-94)   Weight:  [91 kg (200 lb 11.2 oz)-94 kg (207 lb 3.7 oz)]   SpO2:  [85 %-95 %]     Intake/Output last 3 Shifts:  I/O last 3 completed shifts:  In: 270 (2.9 mL/kg) [P.O.:270]  Out: 1100 (11.7 mL/kg) [Urine:1100 (0.3 mL/kg/hr)]  Weight: 94 kg     Relevant Results  Susceptibility data from last 90 days.  Collected Specimen Info Organism Amoxicillin/Clavulanate Ampicillin Ampicillin/Sulbactam Cefazolin Cefazolin (uncomplicated UTIs only) Ciprofloxacin Gentamicin Nitrofurantoin Piperacillin/Tazobactam Trimethoprim/Sulfamethoxazole   05/25/24 Urine from Clean Catch/Voided Klebsiella pneumoniae/variicola  S  R  S  S  S  S  S  S  S  S     No results found for this or any previous visit (from the past 24 hour(s)).     Current Facility-Administered Medications:     acetaminophen (Tylenol) suppository 650 mg, 650 mg, rectal, q4h PRN, Alyssa A  DO Orestes, 650 mg at 05/27/24 0429    amLODIPine (Norvasc) tablet 10 mg, 10 mg, oral, Daily, Srinivas Andino MD, 10 mg at 06/03/24 0804    aspirin EC tablet 81 mg, 81 mg, oral, Daily, Rishi Casas MD, 81 mg at 06/03/24 0804    atorvastatin (Lipitor) tablet 40 mg, 40 mg, oral, Nightly, Srinivas Andino MD, 40 mg at 06/02/24 2047    bisacodyl (Dulcolax) suppository 10 mg, 10 mg, rectal, Daily PRN, Alyssa Carlisle DO    carvedilol (Coreg) tablet 6.25 mg, 6.25 mg, oral, BID, Srinivas Andino MD, 6.25 mg at 06/03/24 0804    dextrose 50 % injection 25 g, 25 g, intravenous, Once, Srinivas Andino MD    docusate sodium (Colace) capsule 100 mg, 100 mg, oral, Daily, Srinivas Andino MD, 100 mg at 06/03/24 0804    hydrALAZINE (Apresoline) injection 10 mg, 10 mg, intravenous, q6h PRN, Srinivas Andino MD    hydrALAZINE (Apresoline) tablet 25 mg, 25 mg, oral, TID, Srinivas Andino MD, 25 mg at 06/03/24 0804    ipratropium-albuteroL (Duo-Neb) 0.5-2.5 mg/3 mL nebulizer solution 3 mL, 3 mL, nebulization, q4h PRN, Srinivas Andino MD    isosorbide mononitrate ER (Imdur) 24 hr tablet 30 mg, 30 mg, oral, Daily, Srinivas Andino MD, 30 mg at 06/03/24 0805    levothyroxine (Synthroid, Levoxyl) tablet 100 mcg, 100 mcg, oral, Daily, Srinivas Andino MD, 100 mcg at 06/03/24 0540    nystatin (Mycostatin) 100,000 unit/gram powder 1 Application, 1 Application, Topical, BID, Srinivas Andino MD, 1 Application at 06/02/24 2047    oxygen (O2) therapy, , inhalation, Continuous PRN - O2/gases, Srinivas Andino MD, 5 L/min at 05/25/24 1849    oxygen (O2) therapy, , inhalation, Continuous - Inhalation, Rishi Casas MD, 40 percent at 06/03/24 0800   Assessment/Plan   Principal Problem:    Somnolence  Pneumonia  Acute acute respiratory  Congestive heart failure  Ureteric infection  Sepsis  Acute bronchospasm    Continue current medication.  Supportive care.  Physical therapy Occupational Therapy metathesis BMP.  Monitor input and daily weight.  Titrate down oxygen as tolerated.   We will take DVT, fall, aspiration, decubitus and DVT precautions.      Rishi Casas MD

## 2024-06-03 NOTE — CONSULTS
"Nutrition Assessement Note    Nutrition Assessment    Reason for Assessment: Length of stay    Reason for Hospital Admission:  Morena Joshi is a 84 y.o. female who is admitted for AMS, uti, respiratory failure. Plan for discharge to LTC. Pt sleeping soundly at this time.     Nutrition History:  Energy Intake: Fair 50-75 %    Anthropometrics:  Ht: 149.9 cm (4' 11.02\"), Wt: 91 kg (200 lb 11.2 oz), BMI: 40.51  IBW/kg (Dietitian Calculated): 44.55 kg  Percent of IBW: 211 %  Adjusted Body Weight (kg): 56.82 kg    Weight Change:  Daily Weight  06/03/24 : 91 kg (200 lb 11.2 oz)  11/03/22 : 78.9 kg (174 lb)  09/01/22 : 79.7 kg (175 lb 12.8 oz)  04/08/22 : 81.2 kg (179 lb)  04/01/22 : 79.5 kg (175 lb 3.2 oz)  03/07/22 : 78 kg (172 lb)  03/04/22 : 78.9 kg (174 lb)  02/11/22 : 79.4 kg (175 lb)  02/10/22 : 77.1 kg (170 lb)  12/23/21 : 78.5 kg (173 lb)     Weight History / % Weight Change: wt gain per wt hx possibly r/t fluid    Nutrition Focused Physical Exam Findings: visually assessed   Subcutaneous Fat Loss  Orbital Fat Pads: Mild-Moderate (slight dark circles and slight hollowing)  Buccal Fat Pads: Mild-Moderate (flat cheeks, minimal bounce)    Muscle Wasting  Temporalis: Mild-Moderate (slight depression)    Edema  Edema:  (generalized nonpitting edema)    Nutrition Significant Labs:  Lab Results   Component Value Date    WBC 8.2 06/02/2024    HGB 10.0 (L) 06/02/2024    HCT 35.2 (L) 06/02/2024     06/02/2024    CHOL 147 09/01/2022    TRIG 92 09/01/2022    HDL 46 (L) 09/01/2022    ALT 9 05/25/2024    AST 16 05/25/2024     06/02/2024    K 4.5 06/02/2024     06/02/2024    CREATININE 1.00 06/02/2024    BUN 30 (H) 06/02/2024    CO2 35 (H) 06/02/2024    TSH 2.10 09/01/2022    INR 1.0 02/23/2023    HGBA1C 5.7 07/02/2020     Nutrition Specific Medications:  amLODIPine, 10 mg, oral, Daily  aspirin, 81 mg, oral, Daily  atorvastatin, 40 mg, oral, Nightly  carvedilol, 6.25 mg, oral, BID  dextrose, 25 g, " intravenous, Once  docusate sodium, 100 mg, oral, Daily  hydrALAZINE, 25 mg, oral, TID  isosorbide mononitrate ER, 30 mg, oral, Daily  levothyroxine, 100 mcg, oral, Daily  nystatin, 1 Application, Topical, BID  oxygen, , inhalation, Continuous - Inhalation      Dietary Orders (From admission, onward)       Start     Ordered    05/27/24 1206  Adult diet Regular; Easy to chew  Diet effective now        Comments: Upright positioning for meal   Question Answer Comment   Diet type Regular    Texture Easy to chew        05/27/24 1206                  Estimated Needs:   Estimated Energy Needs  Total Energy Estimated Needs (kCal): 1708 kCal  Total Estimated Energy Need per Day (kCal/kg): 30 kCal/kg  Method for Estimating Needs: ABW    Estimated Protein Needs  Total Protein Estimated Needs (g): 68 g  Total Protein Estimated Needs (g/kg): 1.2 g/kg  Method for Estimating Needs: ABW    Estimated Fluid Needs  Method for Estimating Needs: 1 ml/kcal        Nutrition Diagnosis   Nutrition Diagnosis:  Malnutrition Diagnosis  Patient has Malnutrition Diagnosis: No    Nutrition Diagnosis  Patient has Nutrition Diagnosis: Yes  Diagnosis Status (1): New  Nutrition Diagnosis 1: Inadequate oral intake  Related to (1): decreased ability to consume sufficient energy  As Evidenced by (1): poor/fair po intake       Nutrition Interventions/Recommendations   Nutrition Interventions and Recommendations:    Nutrition Prescription:  Individualized Nutrition Prescription Provided for : 1708 kcals and 68g protein to be provided via diet and supplements    Nutrition Interventions:   Food and/or Nutrient Delivery Interventions  Interventions: Meals and snacks, Medical food supplement  Meals and Snacks: Texture-modified diet  Goal: provide as ordered  Medical Food Supplement: Commercial beverage  Goal: ensure compact BID to provide 220 kcals and 9g protein each    Education Documentation  No documentation found.           Nutrition Monitoring and  Evaluation   Monitoring/Evaluation:   Food/Nutrient Related History Monitoring  Monitoring and Evaluation Plan: Energy intake  Energy Intake: Estimated energy intake  Criteria: pt to consume >/= 75% estimated needs       Time Spent/Follow-up:   Follow Up  Time Spent (min): 20 minutes  Last Date of Nutrition Visit: 06/03/24  Nutrition Follow-Up Needed?: 5-7 days  Follow up Comment: 6/7/24

## 2024-06-03 NOTE — PROGRESS NOTES
Occupational Therapy    Occupational Therapy Treatment    Name: Morena Joshi  MRN: [id number]  : 1940  Date: 24  Time Calculation  Start Time: 1335  Stop Time: 1358  Time Calculation (min): 23 min    Assessment:  OT Assessment: remains assist x2 for xfers/bed mobility but is improving sitting balance and active participation in OT sessions  Prognosis: Good  Barriers to Discharge: Other (Comment) (assist x2 for all xfers/self care which pt has at LT facility.)  Evaluation/Treatment Tolerance: Patient tolerated treatment well  Medical Staff Made Aware: Yes  End of Session Communication: Bedside nurse  End of Session Patient Position: Bed, 3 rail up, Alarm on  Plan:  Treatment Interventions: ADL retraining, Functional transfer training, UE strengthening/ROM, Endurance training, Cognitive reorientation, Patient/family training, Equipment evaluation/education, Neuromuscular reeducation, Compensatory technique education  OT Frequency: 3 times per week  OT Discharge Recommendations: Low intensity level of continued care  Equipment Recommended upon Discharge: Lift  OT Recommended Transfer Status: Assist of 2  OT - OK to Discharge: Yes    Subjective   Previous Visit Info:  OT Last Visit  OT Received On: 24  General:  General  Reason for Referral: decline in ADLs  Referred By: Dr. Andino  Past Medical History Relevant to Rehab: COPD, HTN, HLD, Hypothyroidism  Family/Caregiver Present: No  Prior to Session Communication: Bedside nurse  Patient Position Received: Bed, 3 rail up, Alarm on  Preferred Learning Style: visual, verbal  General Comment: Cleared by nsg, pt met in supine, agreeable to OT session  Precautions:  Medical Precautions: Fall precautions, Oxygen therapy device and L/min  Vitals:  Vital Signs  SpO2: 93 %  Pain Assessment:  Pain Assessment  Pain Assessment: 0-10  Pain Score: 2  Pain Type: Chronic pain  Pain Location: Back  Pain Orientation: Lower     Objective   Cognition:  Overall Cognitive  Status: Within Functional Limits    Activities of Daily Living:    Grooming  Grooming Level of Assistance: Close supervision, Minimum assistance  Grooming Where Assessed: Edge of bed  Grooming Comments: challenged to complete face washing and grooming tasks while sitting EOB; requires min A intermittently to maintain midline sitting balance d/t occasional retro lean    Bed Mobility/Transfers:   Bed Mobility  Bed Mobility: Yes  Bed Mobility 1  Bed Mobility 1: Supine to sitting  Level of Assistance 1: Maximum assistance, +2  Bed Mobility Comments 1: for trunk/BLE mgmt, HOB elevated. pt cued for technique, sequencing and use of bed rails  Bed Mobility 2  Bed Mobility  2: Sitting to supine  Level of Assistance 2: Maximum assistance, +2  Bed Mobility Comments 2: for safe/controlled descent    Transfers  Transfer: No    Functional Mobility:  Functional Mobility  Functional Mobility Performed: No    Sitting Balance:  Dynamic Sitting Balance  Dynamic Sitting-Balance Support: Feet supported, Bilateral upper extremity supported  Dynamic Sitting-Balance: Trunk control activities (ADLs and ther ex while sitting EOB; requires min A intermittently to maintain midline posture. sat EOB for ~10 minutes today)    Therapy/Activity: Therapeutic Exercise  Therapeutic Exercise Performed: Yes  Therapeutic Exercise Activity 1: UE ther ex performed c/ pt seated supported at EOB to increase overall strength/activity tolerance in prep for ADLs. 1 set x 10 reps with emphasis on shoulder flexion, bicep flexion.  cued for joint alignment and pacing    Outcome Measures:  [facility] Daily Activity  Putting on and taking off regular lower body clothing: Total  Bathing (including washing, rinsing, drying): A lot  Putting on and taking off regular upper body clothing: A lot  Toileting, which includes using toilet, bedpan or urinal: Total  Taking care of personal grooming such as brushing teeth: A little  Eating Meals: A little  Daily Activity - Total  Score: 12    Education Documentation  No documentation found.  Education Comments  No comments found.      Goals:  Encounter Problems       Encounter Problems (Active)       OT Goals       UB ADLs (Progressing)       Start:  05/28/24    Expected End:  06/20/24       Patient will complete UB ADLs with Close Supervision, using AE as needed, in order to increase safety and independence with self-care tasks.         LB ADLs (Progressing)       Start:  05/28/24    Expected End:  06/20/24       Patient will complete LB ADLs with Close Supervision, using AE as needed, in order to increase safety and independence with self-care tasks.           B UE Strengthening (Progressing)       Start:  05/28/24    Expected End:  06/20/24       Patient will increase B UE strength to 3+/5 for functional transfers.         Functional Transfers (Progressing)       Start:  05/28/24    Expected End:  06/20/24       Patient will complete bed mobility tasks with Min A in order to increase patient's safety and independence with daily tasks.         Sitting Balance (Progressing)       Start:  05/28/24    Expected End:  06/20/24       Patient will demonstrate the ability to sit EOB at least >/= 25 minutes with Fair+ balance for increased safety and independence with daily tasks.

## 2024-06-03 NOTE — PROGRESS NOTES
06/03/24 1039   Discharge Planning   Type of Post Acute Facility Services Long term care   Patient expects to be discharged to: [facility].  Patient is a bedhold.  Can return when medically ready.   Does the patient need discharge transport arranged? Yes   RoundTrip coordination needed? Yes   Has discharge transport been arranged? No

## 2024-06-03 NOTE — PROGRESS NOTES
Pulmonary Progress Note    Morena Joshi is a 84 y.o. female on day 9 of admission presenting with Somnolence.    Subjective   No acute events    Objective   Vital Signs      6/2/2024     8:27 PM 6/2/2024     9:19 PM 6/3/2024    12:00 AM 6/3/2024    12:28 AM 6/3/2024     4:00 AM 6/3/2024     4:24 AM 6/3/2024     9:12 AM   Vitals   Systolic 112  125 125 130 130 138   Diastolic 38  47 47 61 61 89   Heart Rate 71  80 76 74 84 76   Temp 36.1 °C (97 °F)   36.6 °C (97.9 °F)  37 °C (98.6 °F) 36.2 °C (97.2 °F)   Resp 24  22 26 23 21    Weight (lb)  207.23        BMI  41.83 kg/m2        BSA (m2)  1.98 m2            Oxygen Therapy  SpO2: (!) 85 %  Medical Gas Therapy: Supplemental oxygen  O2 Delivery Method: Nasal cannula    FiO2 (%):  [40 %-45 %] 40 %  S RR:  [16] 16    Intake/Output previous 24 hours:    Intake/Output Summary (Last 24 hours) at 6/3/2024 1127  Last data filed at 6/3/2024 0600  Gross per 24 hour   Intake 270 ml   Output 700 ml   Net -430 ml       Physical Exam  ...  Lines and Tubes:  Peripheral IV 05/25/24 20 G Left Antecubital (Active)   Placement Date/Time: 05/25/24 1244   Size (Gauge): 20 G  Orientation: Left  Location: Antecubital  Site Prep: Chlorhexidine    Number of days: 8       External Urinary Catheter Female (Active)   Placement Date/Time: 05/25/24 1645   Hand Hygiene Completed: Yes  External Catheter Type: Female   Number of days: 8         Scheduled medications  amLODIPine, 10 mg, oral, Daily  aspirin, 81 mg, oral, Daily  atorvastatin, 40 mg, oral, Nightly  carvedilol, 6.25 mg, oral, BID  dextrose, 25 g, intravenous, Once  docusate sodium, 100 mg, oral, Daily  hydrALAZINE, 25 mg, oral, TID  isosorbide mononitrate ER, 30 mg, oral, Daily  levothyroxine, 100 mcg, oral, Daily  nystatin, 1 Application, Topical, BID  oxygen, , inhalation, Continuous - Inhalation      Continuous medications     PRN medications  PRN medications: acetaminophen, bisacodyl, hydrALAZINE, ipratropium-albuteroL,  oxygen    Relevant Results  Results from last 7 days   Lab Units 06/02/24  0538 05/29/24  0611 05/28/24  0529   WBC AUTO x10*3/uL 8.2 9.6 9.1   HEMOGLOBIN g/dL 10.0* 10.0* 10.5*   HEMATOCRIT % 35.2* 35.0* 36.3   PLATELETS AUTO x10*3/uL 232 272 272      Results from last 7 days   Lab Units 06/02/24  0538 05/29/24  0611 05/28/24  0529   SODIUM mmol/L 141 146* 146*   POTASSIUM mmol/L 4.5 4.4 4.1   CHLORIDE mmol/L 100 103 102   CO2 mmol/L 35* 38* 36*   BUN mg/dL 30* 28* 33*   CREATININE mg/dL 1.00 1.10 1.10   GLUCOSE mg/dL 85 82 78   CALCIUM mg/dL 9.1 9.1 9.0      Results from last 7 days   Lab Units 05/28/24  1036   POCT PH, ARTERIAL pH 7.44*   POCT PCO2, ARTERIAL mm Hg 62*   POCT PO2, ARTERIAL mm Hg 74*   POCT HCO3 CALCULATED, ARTERIAL mmol/L 42.1*   POCT BASE EXCESS, ARTERIAL mmol/L 14.9*     XR chest 1 view 05/25/2024    Narrative  Interpreted By:  Jose Miguel Smith,  STUDY:  XR CHEST 1 VIEW;  5/25/2024 1:14 pm    INDICATION:  Signs/Symptoms:confusion.    COMPARISON:  03/09/2023    ACCESSION NUMBER(S):  [id number]    ORDERING CLINICIAN:  JHONATAN PADILLA    FINDINGS:  Scattered bilateral airspace opacities. Pleural effusions not  excluded. Cardiomegaly suspected. Aortic atherosclerosis.    Impression  Scattered bilateral infiltrates or edema.    MACRO:  None    Signed by: Jose Miguel Smith 5/25/2024 1:17 PM  Dictation workstation:   [id number]      Patient Active Problem List   Diagnosis    Somnolence     Assessment/Plan     Acute on chronic respiratory failure with hypoxia and hypercarbia.  Urinary tract infection.  Klebsiella pneumonia  Toxic metabolic encephalopathy  Acute bronchospasm    Acute on chronic respiratory failure presenting UTI hypoventilation.  Wean oxygen as tolerated  BiPAP as needed  No need for further antibiotics  Bronchodilators    Stable from respiratory standpoint    Bhupendra Carmichael MD  [facility]

## 2024-06-03 NOTE — DISCHARGE SUMMARY
Discharge Diagnosis  Somnolence    Issues Requiring Follow-Up  Acute respiratory failure  Altered mental status  Pneumonia  Acute on chronic renal failure  COPD  Hypertension  Hyperlipidemia  Acute on chronic diastolic congestive heart failure    Discharge Meds     Your medication list        START taking these medications        Instructions Last Dose Given Next Dose Due   aspirin 81 mg EC tablet  Start taking on: June 4, 2024      Take 1 tablet (81 mg) by mouth once daily.       atorvastatin 40 mg tablet  Commonly known as: Lipitor      Take 1 tablet (40 mg) by mouth once daily at bedtime.       bisacodyl 10 mg suppository  Commonly known as: Dulcolax      Insert 1 suppository (10 mg) into the rectum once daily as needed for constipation.       hydrALAZINE 20 mg/mL injection  Commonly known as: Apresoline  Replaces: hydrALAZINE 25 mg tablet      Infuse 0.5 mL (10 mg) into a venous catheter every 6 hours if needed (SBP >160).       oxygen gas therapy  Commonly known as: O2      Inhale 6 L/min every 12 hours.              CONTINUE taking these medications        Instructions Last Dose Given Next Dose Due   acetaminophen 325 mg tablet  Commonly known as: Tylenol           carvedilol 6.25 mg tablet  Commonly known as: Coreg           docusate sodium 100 mg capsule  Commonly known as: Colace           ergocalciferol 1.25 MG (30763 UT) capsule  Commonly known as: Vitamin D-2           hydrOXYzine HCL 10 mg tablet  Commonly known as: Atarax           ipratropium-albuteroL 0.5-2.5 mg/3 mL nebulizer solution  Commonly known as: Duo-Neb           isosorbide mononitrate ER 30 mg 24 hr tablet  Commonly known as: Imdur           levothyroxine 100 mcg tablet  Commonly known as: Synthroid, Levoxyl           melatonin 10 mg tablet           nystatin 100,000 unit/gram powder  Commonly known as: Mycostatin           omeprazole OTC 20 mg EC tablet  Commonly known as: PriLOSEC OTC           ondansetron 4 mg tablet  Commonly known as:  Zofran           polyethylene glycol 17 gram packet  Commonly known as: Glycolax, Miralax           polymyxin B sulf-trimethoprim ophthalmic solution  Commonly known as: Polytrim                  STOP taking these medications      amLODIPine 10 mg tablet  Commonly known as: Norvasc        hydrALAZINE 25 mg tablet  Commonly known as: Apresoline  Replaced by: hydrALAZINE 20 mg/mL injection        pravastatin 40 mg tablet  Commonly known as: Pravachol                  Where to Get Your Medications        Information about where to get these medications is not yet available    Ask your nurse or doctor about these medications  aspirin 81 mg EC tablet  atorvastatin 40 mg tablet  bisacodyl 10 mg suppository  hydrALAZINE 20 mg/mL injection  oxygen gas therapy         Test Results Pending At Discharge  Pending Labs       No current pending labs.            Hospital Course   Patient was admitted to complaint of shortness of breath.  And altered mental status.  Patient was in acute respiratory failure.  Patient also had acute on chronic respiratory failure, acute on chronic kidney disease failure and pneumonia.  Patient on the broad-spectrum IV antibiotic.  Respiratory events were given.  Input and output will monitor.  Condition of patient improved slowly but steadily.  Patient still on oxygen.  Patient be cleared by pulmonary to go home.  Patient is being discharged with a extended-care facility per PT OT recommendation and for the clearance of pulmonary.    Pertinent Physical Exam At Time of Discharge  Physical Exam    Outpatient Follow-Up  No future appointments.      Rishi Casas MD

## 2024-06-03 NOTE — PROGRESS NOTES
Physical Therapy    Physical Therapy Treatment    Patient Name: Morena Joshi  MRN: [id number]  Today's Date: 6/3/2024  Time Calculation  Start Time: 1535  Stop Time: 1610  Time Calculation (min): 35 min    Assessment/Plan   PT Assessment  Barriers to Discharge: assist x2 for bed mobility, Dep transfers OOB. Assist for all ADLs  Evaluation/Treatment Tolerance: Patient tolerated treatment well, Patient limited by fatigue  Medical Staff Made Aware: Yes  End of Session Communication: Bedside nurse  Assessment Comment: Demonstrated significant weakness warranting skilled PT care  End of Session Patient Position: Bed, 3 rail up, Alarm on     PT Plan  Treatment/Interventions: Bed mobility, Transfer training, Balance training, Strengthening, Endurance training, Range of motion, Therapeutic exercise, Therapeutic activity  PT Plan: Skilled PT  PT Frequency: 3 times per week  PT Discharge Recommendations: Moderate intensity level of continued care  Equipment Recommended upon Discharge: Lift  PT Recommended Transfer Status: Total assist  PT - OK to Discharge: Yes      General Visit Information:   PT  Visit  PT Received On: 06/03/24  Response to Previous Treatment: Patient with no complaints from previous session.  General  Family/Caregiver Present: No  Prior to Session Communication: Bedside nurse  Patient Position Received: Bed, 3 rail up, Alarm on  Preferred Learning Style: visual, verbal  General Comment: Agreeable to PT follow up; nursing cleared    Subjective   Precautions:  Precautions  Medical Precautions: Fall precautions, Oxygen therapy device and L/min    Objective   Pain:  Pain Assessment  Pain Assessment: 0-10  Pain Score: 0 - No pain  Cognition:  Cognition  Overall Cognitive Status: Impaired  Orientation Level: Disoriented to time, Disoriented to situation  Insight: Moderate    Postural Control:  Static Sitting Balance  Static Sitting-Balance Support: Feet unsupported, Bilateral upper extremity supported  Static  Sitting-Level of Assistance: Maximum assistance  Static Sitting-Comment/Number of Minutes: 5 minutes and 30 seconds at EOB. Assist to correct posterior lean    Activity Tolerance:  Activity Tolerance  Endurance: Decreased tolerance for upright activites  Activity Tolerance Comments: fatigue sitting EOB  Treatments:  Therapeutic Exercise  Therapeutic Exercise Performed: Yes  Therapeutic Exercise Activity 1: Pt educated on and completes B ankle pumps x 30, quad sets x10, assisted R heel slides x 10, unassisted L heel slides x10, resisted hip ABD x 10, resisted hip ADD x 10, glute sets x 10, AROM SLR x10, sitting knee kicks x 10, seated marches x10      Bed Mobility  Bed Mobility: Yes  Bed Mobility 1  Bed Mobility 1: Supine to sitting  Level of Assistance 1: Maximum assistance, +2  Bed Mobility Comments 1: assist on trunk and BLEs,cues given on technique  Bed Mobility 2  Bed Mobility  2: Sitting to supine  Level of Assistance 2: Maximum assistance, +2  Bed Mobility Comments 2: assist on trunk and BLEs; verbal cues given  Bed Mobility 3  Bed Mobility 3: Scooting  Level of Assistance 3: Dependent, +2  Bed Mobility Comments 3: use of green draw sheet to boost Pt up in bed for comfort    Transfers  Transfer: No    Static Sitting Balance  Static Sitting-Balance Support: Feet unsupported, Bilateral upper extremity supported  Static Sitting-Level of Assistance: Maximum assistance  Static Sitting-Comment/Number of Minutes: 5 minutes and 30 seconds at EOB. Assist to correct posterior lean    Outcome Measures:  [facility] Basic Mobility  Turning from your back to your side while in a flat bed without using bedrails: Total  Moving from lying on your back to sitting on the side of a flat bed without using bedrails: Total  Moving to and from bed to chair (including a wheelchair): Total  Standing up from a chair using your arms (e.g. wheelchair or bedside chair): Total  To walk in hospital room: Total  Climbing 3-5 steps with railing:  Total  Basic Mobility - Total Score: 6          OP EDUCATION:       Encounter Problems       Encounter Problems (Active)       Mobility       Pt will participate w/ LE exercises to promote functional strength and mobility (Progressing)       Start:  05/28/24    Expected End:  06/07/24            Pt will tolerate sitting EOB x 20 minutes for functional endurance (Progressing)       Start:  05/28/24    Expected End:  06/07/24               PT Transfers       STG - Patient to transfer to and from sit to supine w/ mod assist of 2 (Progressing)       Start:  05/28/24    Expected End:  06/07/24            STG - Patient will transfer sit to and from stand w/ max assist of 2 tolerating 30 seconds or more (Progressing)       Start:  05/28/24    Expected End:  06/07/24

## 2024-06-04 VITALS
SYSTOLIC BLOOD PRESSURE: 105 MMHG | TEMPERATURE: 98.1 F | WEIGHT: 202.82 LBS | RESPIRATION RATE: 23 BRPM | DIASTOLIC BLOOD PRESSURE: 62 MMHG | HEART RATE: 78 BPM | HEIGHT: 59 IN | BODY MASS INDEX: 40.89 KG/M2 | OXYGEN SATURATION: 85 %

## 2024-06-04 PROCEDURE — 94760 N-INVAS EAR/PLS OXIMETRY 1: CPT

## 2024-06-04 PROCEDURE — 2500000005 HC RX 250 GENERAL PHARMACY W/O HCPCS: Performed by: INTERNAL MEDICINE

## 2024-06-04 PROCEDURE — 2500000001 HC RX 250 WO HCPCS SELF ADMINISTERED DRUGS (ALT 637 FOR MEDICARE OP): Performed by: FAMILY MEDICINE

## 2024-06-04 PROCEDURE — 9420000001 HC RT PATIENT EDUCATION 5 MIN

## 2024-06-04 PROCEDURE — 2500000001 HC RX 250 WO HCPCS SELF ADMINISTERED DRUGS (ALT 637 FOR MEDICARE OP): Performed by: INTERNAL MEDICINE

## 2024-06-04 PROCEDURE — 94660 CPAP INITIATION&MGMT: CPT

## 2024-06-04 RX ADMIN — ASPIRIN 81 MG: 81 TABLET, COATED ORAL at 09:45

## 2024-06-04 RX ADMIN — ISOSORBIDE MONONITRATE 30 MG: 30 TABLET, EXTENDED RELEASE ORAL at 09:45

## 2024-06-04 RX ADMIN — AMLODIPINE BESYLATE 10 MG: 10 TABLET ORAL at 09:45

## 2024-06-04 RX ADMIN — CARVEDILOL 6.25 MG: 6.25 TABLET, FILM COATED ORAL at 09:44

## 2024-06-04 RX ADMIN — Medication 2 L/MIN: at 07:37

## 2024-06-04 RX ADMIN — HYDRALAZINE HYDROCHLORIDE 25 MG: 25 TABLET ORAL at 09:45

## 2024-06-04 RX ADMIN — DOCUSATE SODIUM 100 MG: 100 CAPSULE, LIQUID FILLED ORAL at 09:44

## 2024-06-04 RX ADMIN — LEVOTHYROXINE SODIUM 100 MCG: 0.1 TABLET ORAL at 05:02

## 2024-06-04 ASSESSMENT — PAIN SCALES - PAIN ASSESSMENT IN ADVANCED DEMENTIA (PAINAD)
BODYLANGUAGE: RELAXED
FACIALEXPRESSION: SMILING OR INEXPRESSIVE
CONSOLABILITY: NO NEED TO CONSOLE
BREATHING: NORMAL
TOTALSCORE: 0

## 2024-06-04 ASSESSMENT — PAIN SCALES - GENERAL
PAINLEVEL_OUTOF10: 0 - NO PAIN

## 2024-06-04 ASSESSMENT — PAIN - FUNCTIONAL ASSESSMENT
PAIN_FUNCTIONAL_ASSESSMENT: PAINAD (PAIN ASSESSMENT IN ADVANCED DEMENTIA SCALE)
PAIN_FUNCTIONAL_ASSESSMENT: PAINAD (PAIN ASSESSMENT IN ADVANCED DEMENTIA SCALE)

## 2024-06-04 NOTE — CARE PLAN
The patient's goals for the shift include UTD    The clinical goals for the shift include wean O2 to baseline    Over the shift, the patient did not make progress toward the following goals. Barriers to progression include Patient's chronic conditions and co-morbidity symptoms.  Recommendations to address these barriers include Out of bed three times today, No signs of respiratory distress (eg. Use of accessory muscles. Peds grunting, and continue to wean oxygen to maintain O2 saturation per order/standard by discharge.

## 2024-06-04 NOTE — CARE PLAN
The patient's goals for the shift include UTD    The clinical goals for the shift include wean O2 to baseline

## 2024-06-04 NOTE — DOCUMENTATION CLARIFICATION NOTE
"    PATIENT:               MORENA JOSHI  ACCT #:                  [id number]  MRN:                       [id number]  :                       1940  ADMIT DATE:       2024 12:33 PM  DISCH DATE:  RESPONDING PROVIDER #:        [id number]          PROVIDER RESPONSE TEXT:    Sepsis 2/2  Pneumonia/UTI with associated acute multi-system organ dysfunction EWELINA, Metabolic Encephalopathy, Acute Hypoxic Respiratory Failure POA    CDI QUERY TEXT:    Clarification        Instruction:    Based on your assessment of the patient and the clinical information, please provide the requested documentation by clicking on the appropriate radio button and enter any additional information if prompted.    Question: Please further clarify if a relationship exists between the Sepsis and acute organ dysfunction    When answering this query, please exercise your independent professional judgment. The fact that a question is being asked, does not imply that any particular answer is desired or expected.    The patient's clinical indicators include:  Clinical Information:  Morena Joshi is a 84 y.o. female presenting with Somnolence.    Clinical Indicators:  ED PN:  \"During her stay in the ED she was found to have a UTI and Rocephin was ordered. She was found have acute kidney injury with elevated creatinine. She also appears to be in A-fib with slow ventricular response, she has no reported history of A-fib. Troponin is indeterminate and BNP is elevated, she does have pulm edema on chest x-ray concerning for acute CHF as a cause of her respiratory failure. Blood gas did show evidence that she is retaining CO2 with pCO2 of 83, pH is 7.3. She was placed on BiPAP to treat this as this could be causing some of her somnolence\"  Creatinine:  1.9/1.8/1.4/1.10  WBC: 9.4/8.5/9.1 etc  ED VS:36.3;62;22;134/108;89 RA  LA:  0.7 (ABG ED)  UA/Urine Culture:  Amp resisitant K. Pneumoniae  AB.3//47/83/40.8 ()  CXR:  Scattered bilateral infiltrates " "or edema.  CT Head:  No acute intracranial bleed. No midline shift.    5/25 H/P:  \"Metabolic encephalopathy  Acute hypercapnic respiratory failure  CHF/COPD exacerbation  Urinary tract infection  Subacute ischemic stroke  Renal insufficiency, acute vs chronic\"    6/3 DC Summary:  \"Patient also had acute on chronic respiratory failure, acute on chronic kidney disease failure and pneumonia.\"    Treatment:  ABG; Sepsis PULIDO; BiPAP; Echo; Rocephin IV; Pulmonary Consult; 4-5 L NC; RT    Risk Factors:  Age; SNF; Obesity;  Options provided:  -- Sepsis 2/2 Pneumonia/UTI with associated acute multi-system organ dysfunction EWELINA, Metabolic Encephalopathy, Acute Hypoxic Respiratory Failure POA  -- Sepsis with other associated acute organ dysfunction, Please specify additional information below  -- Other - I will add my own diagnosis  -- Refer to Clinical Documentation Reviewer    Query created by: Jacquie Kunz on 6/4/2024 9:06 AM      Electronically signed by:  ANTONIO FOX MD 6/4/2024 9:16 AM          "

## 2024-06-04 NOTE — PROGRESS NOTES
06/04/24 0900   Discharge Planning   Home or Post Acute Services Post acute facilities (Rehab/SNF/etc)   Type of Post Acute Facility Services Long term care   Patient expects to be discharged to: [facility].  Patient is a bedhold.  Can return when medically ready.   Does the patient need discharge transport arranged? Yes   RoundTrip coordination needed? Yes     1105  Transport arranged for 12:30 pm via [employer].  Bedside nurse and facility advised of  time.  Discharge documents sent to facility via [facility].    7111  Notified patient's daughter/PHAM Yuan [phone] of patient's discharge.

## 2024-09-25 ENCOUNTER — APPOINTMENT (OUTPATIENT)
Dept: RADIOLOGY | Facility: HOSPITAL | Age: 84
End: 2024-09-25
Payer: COMMERCIAL

## 2024-09-25 ENCOUNTER — HOSPITAL ENCOUNTER (EMERGENCY)
Facility: HOSPITAL | Age: 84
Discharge: HOME | End: 2024-09-26
Attending: STUDENT IN AN ORGANIZED HEALTH CARE EDUCATION/TRAINING PROGRAM
Payer: COMMERCIAL

## 2024-09-25 ENCOUNTER — APPOINTMENT (OUTPATIENT)
Dept: CARDIOLOGY | Facility: HOSPITAL | Age: 84
End: 2024-09-25
Payer: COMMERCIAL

## 2024-09-25 DIAGNOSIS — R10.84 ABDOMINAL PAIN, GENERALIZED: Primary | ICD-10-CM

## 2024-09-25 DIAGNOSIS — K56.41 FECAL IMPACTION IN RECTUM (MULTI): ICD-10-CM

## 2024-09-25 LAB
ALBUMIN SERPL BCP-MCNC: 3.2 G/DL (ref 3.4–5)
ALP SERPL-CCNC: 92 U/L (ref 33–136)
ALT SERPL W P-5'-P-CCNC: 6 U/L (ref 7–45)
ANION GAP SERPL CALCULATED.3IONS-SCNC: 11 MMOL/L (ref 10–20)
APPEARANCE UR: ABNORMAL
AST SERPL W P-5'-P-CCNC: 15 U/L (ref 9–39)
BASOPHILS # BLD AUTO: 0.07 X10*3/UL (ref 0–0.1)
BASOPHILS NFR BLD AUTO: 0.7 %
BILIRUB SERPL-MCNC: 0.4 MG/DL (ref 0–1.2)
BILIRUB UR STRIP.AUTO-MCNC: NEGATIVE MG/DL
BUN SERPL-MCNC: 31 MG/DL (ref 6–23)
CALCIUM SERPL-MCNC: 8.7 MG/DL (ref 8.6–10.3)
CHLORIDE SERPL-SCNC: 100 MMOL/L (ref 98–107)
CO2 SERPL-SCNC: 33 MMOL/L (ref 21–32)
COLOR UR: ABNORMAL
CREAT SERPL-MCNC: 1.03 MG/DL (ref 0.5–1.05)
EGFRCR SERPLBLD CKD-EPI 2021: 54 ML/MIN/1.73M*2
EOSINOPHIL # BLD AUTO: 0.38 X10*3/UL (ref 0–0.4)
EOSINOPHIL NFR BLD AUTO: 3.7 %
ERYTHROCYTE [DISTWIDTH] IN BLOOD BY AUTOMATED COUNT: 17.1 % (ref 11.5–14.5)
GLUCOSE SERPL-MCNC: 103 MG/DL (ref 74–99)
GLUCOSE UR STRIP.AUTO-MCNC: NORMAL MG/DL
HCT VFR BLD AUTO: 40.3 % (ref 36–46)
HGB BLD-MCNC: 12.2 G/DL (ref 12–16)
IMM GRANULOCYTES # BLD AUTO: 0.03 X10*3/UL (ref 0–0.5)
IMM GRANULOCYTES NFR BLD AUTO: 0.3 % (ref 0–0.9)
KETONES UR STRIP.AUTO-MCNC: NEGATIVE MG/DL
LEUKOCYTE ESTERASE UR QL STRIP.AUTO: ABNORMAL
LIPASE SERPL-CCNC: 16 U/L (ref 9–82)
LYMPHOCYTES # BLD AUTO: 3.14 X10*3/UL (ref 0.8–3)
LYMPHOCYTES NFR BLD AUTO: 30.7 %
MCH RBC QN AUTO: 26.1 PG (ref 26–34)
MCHC RBC AUTO-ENTMCNC: 30.3 G/DL (ref 32–36)
MCV RBC AUTO: 86 FL (ref 80–100)
MONOCYTES # BLD AUTO: 1.29 X10*3/UL (ref 0.05–0.8)
MONOCYTES NFR BLD AUTO: 12.6 %
MUCOUS THREADS #/AREA URNS AUTO: ABNORMAL /LPF
NEUTROPHILS # BLD AUTO: 5.32 X10*3/UL (ref 1.6–5.5)
NEUTROPHILS NFR BLD AUTO: 52 %
NITRITE UR QL STRIP.AUTO: NEGATIVE
NRBC BLD-RTO: 0 /100 WBCS (ref 0–0)
PH UR STRIP.AUTO: 6 [PH]
PLATELET # BLD AUTO: 282 X10*3/UL (ref 150–450)
POTASSIUM SERPL-SCNC: 3.8 MMOL/L (ref 3.5–5.3)
PROT SERPL-MCNC: 6.9 G/DL (ref 6.4–8.2)
PROT UR STRIP.AUTO-MCNC: ABNORMAL MG/DL
RBC # BLD AUTO: 4.68 X10*6/UL (ref 4–5.2)
RBC # UR STRIP.AUTO: ABNORMAL /UL
RBC #/AREA URNS AUTO: ABNORMAL /HPF
SODIUM SERPL-SCNC: 140 MMOL/L (ref 136–145)
SP GR UR STRIP.AUTO: 1.03
SQUAMOUS #/AREA URNS AUTO: ABNORMAL /HPF
UROBILINOGEN UR STRIP.AUTO-MCNC: NORMAL MG/DL
WBC # BLD AUTO: 10.2 X10*3/UL (ref 4.4–11.3)
WBC #/AREA URNS AUTO: >50 /HPF
WBC CLUMPS #/AREA URNS AUTO: ABNORMAL /HPF

## 2024-09-25 PROCEDURE — 81001 URINALYSIS AUTO W/SCOPE: CPT

## 2024-09-25 PROCEDURE — 99285 EMERGENCY DEPT VISIT HI MDM: CPT

## 2024-09-25 PROCEDURE — 36415 COLL VENOUS BLD VENIPUNCTURE: CPT

## 2024-09-25 PROCEDURE — 87086 URINE CULTURE/COLONY COUNT: CPT | Mod: TRILAB,WESLAB

## 2024-09-25 PROCEDURE — 96361 HYDRATE IV INFUSION ADD-ON: CPT

## 2024-09-25 PROCEDURE — 2500000001 HC RX 250 WO HCPCS SELF ADMINISTERED DRUGS (ALT 637 FOR MEDICARE OP)

## 2024-09-25 PROCEDURE — 80053 COMPREHEN METABOLIC PANEL: CPT

## 2024-09-25 PROCEDURE — 2500000004 HC RX 250 GENERAL PHARMACY W/ HCPCS (ALT 636 FOR OP/ED)

## 2024-09-25 PROCEDURE — 74177 CT ABD & PELVIS W/CONTRAST: CPT

## 2024-09-25 PROCEDURE — 85025 COMPLETE CBC W/AUTO DIFF WBC: CPT

## 2024-09-25 PROCEDURE — 96374 THER/PROPH/DIAG INJ IV PUSH: CPT | Mod: 59

## 2024-09-25 PROCEDURE — 96376 TX/PRO/DX INJ SAME DRUG ADON: CPT

## 2024-09-25 PROCEDURE — 2550000001 HC RX 255 CONTRASTS: Performed by: STUDENT IN AN ORGANIZED HEALTH CARE EDUCATION/TRAINING PROGRAM

## 2024-09-25 PROCEDURE — 74177 CT ABD & PELVIS W/CONTRAST: CPT | Performed by: RADIOLOGY

## 2024-09-25 PROCEDURE — 83690 ASSAY OF LIPASE: CPT

## 2024-09-25 PROCEDURE — 93005 ELECTROCARDIOGRAM TRACING: CPT

## 2024-09-25 PROCEDURE — 2500000004 HC RX 250 GENERAL PHARMACY W/ HCPCS (ALT 636 FOR OP/ED): Performed by: STUDENT IN AN ORGANIZED HEALTH CARE EDUCATION/TRAINING PROGRAM

## 2024-09-25 RX ORDER — FENTANYL CITRATE 50 UG/ML
25 INJECTION, SOLUTION INTRAMUSCULAR; INTRAVENOUS ONCE
Status: COMPLETED | OUTPATIENT
Start: 2024-09-25 | End: 2024-09-25

## 2024-09-25 RX ORDER — DICYCLOMINE HYDROCHLORIDE 10 MG/1
10 CAPSULE ORAL ONCE
Status: COMPLETED | OUTPATIENT
Start: 2024-09-25 | End: 2024-09-25

## 2024-09-25 RX ORDER — MORPHINE SULFATE 2 MG/ML
2 INJECTION, SOLUTION INTRAMUSCULAR; INTRAVENOUS ONCE
Status: DISCONTINUED | OUTPATIENT
Start: 2024-09-25 | End: 2024-09-25

## 2024-09-25 RX ADMIN — DICYCLOMINE HYDROCHLORIDE 10 MG: 10 CAPSULE ORAL at 22:10

## 2024-09-25 RX ADMIN — IOHEXOL 75 ML: 350 INJECTION, SOLUTION INTRAVENOUS at 22:57

## 2024-09-25 RX ADMIN — FENTANYL CITRATE 25 MCG: 50 INJECTION INTRAMUSCULAR; INTRAVENOUS at 22:10

## 2024-09-25 RX ADMIN — SODIUM CHLORIDE 500 ML: 900 INJECTION, SOLUTION INTRAVENOUS at 22:10

## 2024-09-25 RX ADMIN — FENTANYL CITRATE 25 MCG: 50 INJECTION INTRAMUSCULAR; INTRAVENOUS at 23:27

## 2024-09-25 ASSESSMENT — PAIN DESCRIPTION - PAIN TYPE: TYPE: ACUTE PAIN

## 2024-09-25 ASSESSMENT — PAIN DESCRIPTION - LOCATION
LOCATION: BACK
LOCATION: ABDOMEN

## 2024-09-25 ASSESSMENT — PAIN DESCRIPTION - DESCRIPTORS: DESCRIPTORS: ACHING

## 2024-09-25 ASSESSMENT — PAIN SCALES - GENERAL: PAINLEVEL_OUTOF10: 8

## 2024-09-25 ASSESSMENT — PAIN - FUNCTIONAL ASSESSMENT: PAIN_FUNCTIONAL_ASSESSMENT: 0-10

## 2024-09-25 ASSESSMENT — PAIN DESCRIPTION - ORIENTATION: ORIENTATION: LEFT

## 2024-09-26 VITALS
TEMPERATURE: 96.7 F | OXYGEN SATURATION: 100 % | HEIGHT: 62 IN | BODY MASS INDEX: 34.52 KG/M2 | SYSTOLIC BLOOD PRESSURE: 154 MMHG | WEIGHT: 187.61 LBS | HEART RATE: 70 BPM | RESPIRATION RATE: 18 BRPM | DIASTOLIC BLOOD PRESSURE: 74 MMHG

## 2024-09-26 NOTE — ED PROVIDER NOTES
Patient signed out to me from the physician assistant pending CT results and final disposition.  This is an 84-year-old female coming in with left-sided abdominal pain starting earlier today from her nursing facility.  Medical workup in the emergency department is been unremarkable, Urine specimen shows white blood cells but no bacteria, is contaminated based on large amount of squamous epithelial cells.  Will send for culture.  Patient denies urinary symptoms, no indication for antibiotics now unless culture becomes positive.    Abdominal CT shows a large amount of stool within the rectum concerning for rectal fecal impaction, there is also some circumferential bladder wall thickening concerning for chronic cystitis.  No other acute pathology identified on imaging or lab work today.  Patient was given an enema with large amount of stool output.  She is already on a bowel regimen at the facility where she resides.  She was discharged back to the facility in improved condition.    ED Course as of 09/26/24 0025   Wed Sep 25, 2024   2218 Due to patient's allergy to codeine and NSAIDs, fentanyl/Bentyl ordered for pain. [AR]   u Sep 26, 2024   0022 Squamous Epithelial Cells, Urine: 26-50 (1+) [NT]      ED Course User Index  [AR] Cong Dixon PA-C  [NT] Juanjo López DO         Diagnoses as of 09/26/24 0025   Abdominal pain, generalized   Fecal impaction in rectum (Multi)         Physical Exam  General: well developed, well nourished elderly female who is awake and alert, in no apparent distress  Eyes: sclera clear bilaterally, PERRL, EOMI  HENT: normocephalic, atraumatic.   CV: regular rate and rhythm, no murmur, no gallops, or rubs.   Resp: clear to ascultation bilaterally, no wheezes, rales, or rhonchi  GI: abdomen soft, nontender without rigidity or guarding, no peritoneal signs, abdomen is nondistended, no masses palpated  Psych: appropriate mood and affect, cooperative with exam  Skin: warm, dry,  without evidence of rash or abrasions       Juanjo López, DO  09/26/24 0025    EKG on my interpretation shows A-fib with rate of 67 beats minute.  Normal axis.  QTc is 450 ms.  There is no ST elevation or depression, no acute ischemic pattern.  No STEMI.     Juanjo López, DO  10/11/24 1916

## 2024-09-26 NOTE — ED PROVIDER NOTES
HPI   Chief Complaint   Patient presents with    Abdominal Pain     LLQ pain radiates to back. Patient states started 1900. Patient from [facility]       Patient is an 84-year-old female presenting from [facility] with complaints of left-sided abdominal pain that started earlier today.  Patient is A&O x 2 at baseline.  Patient is able to provide partial history and states that her left lower abdominal pain did start earlier tonight and she has no history of abdominal surgeries.  Patient denies fevers, chills, cough, sore throat, runny nose, chest pain, shortness of breath, nausea, vomiting, diarrhea or urinary complaints.              Patient History   History reviewed. No pertinent past medical history.  History reviewed. No pertinent surgical history.  No family history on file.  Social History     Tobacco Use    Smoking status: Former     Types: Cigarettes     Passive exposure: Past    Smokeless tobacco: Never   Vaping Use    Vaping status: Former   Substance Use Topics    Alcohol use: Never    Drug use: Never       Physical Exam   ED Triage Vitals   Temp Pulse Resp BP   -- -- -- --      SpO2 Temp src Heart Rate Source Patient Position   -- -- -- --      BP Location FiO2 (%)     -- --       Physical Exam  Vitals and nursing note reviewed.   Constitutional:       Appearance: She is well-developed.      Comments: Awake, laying in examination bed   HENT:      Head: Normocephalic and atraumatic.      Nose: Nose normal.      Mouth/Throat:      Mouth: Mucous membranes are moist.      Pharynx: Oropharynx is clear.   Eyes:      Extraocular Movements: Extraocular movements intact.      Conjunctiva/sclera: Conjunctivae normal.      Pupils: Pupils are equal, round, and reactive to light.   Cardiovascular:      Rate and Rhythm: Normal rate and regular rhythm.      Heart sounds: No murmur heard.  Pulmonary:      Effort: Pulmonary effort is normal. No respiratory distress.      Breath sounds: Normal  breath sounds.   Abdominal:      Palpations: Abdomen is soft.      Tenderness: There is abdominal tenderness.      Comments: Left-sided abdominal pain that this pain across the abdomen   Musculoskeletal:         General: No swelling. Normal range of motion.      Cervical back: Neck supple.   Skin:     General: Skin is warm and dry.      Capillary Refill: Capillary refill takes less than 2 seconds.   Neurological:      General: No focal deficit present.      Mental Status: She is alert. Mental status is at baseline.   Psychiatric:         Mood and Affect: Mood normal.         Behavior: Behavior normal.           ED Course & MDM   ED Course as of 09/25/24 2255   Wed Sep 25, 2024   2218 Due to patient's allergy to codeine and NSAIDs, fentanyl/Bentyl ordered for pain. [AR]      ED Course User Index  [AR] Cong Dixon PA-C         Diagnoses as of 09/25/24 2255   Abdominal pain, generalized                 No data recorded     Josef Coma Scale Score: 15 (09/25/24 2149 : Tessa Diggs RN)                           Medical Decision Making  Patient is an 84-year-old female presenting from [facility] with complaints of left-sided abdominal pain that started earlier today.  Lab work, urine, imaging ordered.  Condition considered include but are not limited: Intra-abdominal pathology, musculoskeletal complaint, muscle spasm.    I saw this patient in conjunction with Dr. López.  CBC is without leukocytosis or anemia.  CMP without significant electrolyte abnormality or renal impairment.  Lipase within normal limits.  Urine pending.  CT scan pending.    Time of my departure.  Please refer to attending physician note for further evaluation, treatment and final disposition.    Portions of this note made with Dragon software, please be mindful of potential grammatical errors.        Medications   sodium chloride 0.9 % bolus 500 mL (500 mL intravenous New Bag 9/25/24 2210)   dicyclomine (Bentyl) capsule 10 mg  (10 mg oral Given 9/25/24 2210)   fentaNYL PF (Sublimaze) injection 25 mcg (25 mcg intravenous Given 9/25/24 2210)         Labs Reviewed   COMPREHENSIVE METABOLIC PANEL - Abnormal       Result Value    Glucose 103 (*)     Sodium 140      Potassium 3.8      Chloride 100      Bicarbonate 33 (*)     Anion Gap 11      Urea Nitrogen 31 (*)     Creatinine 1.03      eGFR 54 (*)     Calcium 8.7      Albumin 3.2 (*)     Alkaline Phosphatase 92      Total Protein 6.9      AST 15      Bilirubin, Total 0.4      ALT 6 (*)    CBC WITH AUTO DIFFERENTIAL - Abnormal    WBC 10.2      nRBC 0.0      RBC 4.68      Hemoglobin 12.2      Hematocrit 40.3      MCV 86      MCH 26.1      MCHC 30.3 (*)     RDW 17.1 (*)     Platelets 282      Neutrophils % 52.0      Immature Granulocytes %, Automated 0.3      Lymphocytes % 30.7      Monocytes % 12.6      Eosinophils % 3.7      Basophils % 0.7      Neutrophils Absolute 5.32      Immature Granulocytes Absolute, Automated 0.03      Lymphocytes Absolute 3.14 (*)     Monocytes Absolute 1.29 (*)     Eosinophils Absolute 0.38      Basophils Absolute 0.07     LIPASE - Normal    Lipase 16      Narrative:     Venipuncture immediately after or during the administration of Metamizole may lead to falsely low results. Testing should be performed immediately prior to Metamizole dosing.   URINALYSIS WITH REFLEX CULTURE AND MICROSCOPIC    Narrative:     The following orders were created for panel order Urinalysis with Reflex Culture and Microscopic.  Procedure                               Abnormality         Status                     ---------                               -----------         ------                     Urinalysis with Reflex C...[[id number]]                                                 Extra Urine Gray Tube[[id number]]                                                         Please view results for these tests on the individual orders.   URINALYSIS WITH REFLEX CULTURE AND MICROSCOPIC   EXTRA  URINE GRAY TUBE       CT abdomen pelvis w IV contrast    (Results Pending)         Procedure  Procedures     Cong Dixon PA-C  09/25/24 5030

## 2024-09-26 NOTE — DISCHARGE INSTRUCTIONS
Take Miralax 2 capfuls twice per day. You can increase this by one capful as needed with each dose until you are having 2 soft bowel movements per day. Follow up with your primary care physician in the next 24-48 horus to schedule an appointment for further evaluation and management of your symptoms.  You can add senna to your bowel regimen for further treatment in addition to the medications you are already taking.    Return to the ED for any new or concerning symptoms including but not limited to severe worsening or sudden onset abdominal pain, worsening nausea and vomiting preventing you from eating, drinking, or taking your medications, inability to pass gas that persists for 24 hours which may require further evaluation and treatment, or progressive worsening of your symptoms despite treatment.

## 2024-09-28 LAB
ATRIAL RATE: 40 BPM
BACTERIA UR CULT: ABNORMAL
Q ONSET: 217 MS
QRS COUNT: 11 BEATS
QRS DURATION: 86 MS
QT INTERVAL: 426 MS
QTC CALCULATION(BAZETT): 450 MS
QTC FREDERICIA: 442 MS
R AXIS: 36 DEGREES
T AXIS: 44 DEGREES
T OFFSET: 430 MS
VENTRICULAR RATE: 67 BPM

## 2024-09-30 ENCOUNTER — TELEPHONE (OUTPATIENT)
Dept: PHARMACY | Facility: HOSPITAL | Age: 84
End: 2024-09-30
Payer: COMMERCIAL

## 2024-09-30 NOTE — PROGRESS NOTES
EDPD Note: Lab/Chart Reviewed    Reviewed Mr./Mrs./Ms. Morena Joshi 's chart regarding a positive urine culture/result that was taken during their recent emergency room visit. The patient was transferred back to their rehab/LTC facility .Therefore, I have faxed this information to Corky at [facility] at fax number [phone] .    Susceptibility data from last 90 days.  Collected Specimen Info Organism Amoxicillin/Clavulanate Ampicillin Ampicillin/Sulbactam Aztreonam Cefazolin Cefazolin (uncomplicated UTIs only) Cefepime Cefotaxime Ceftazidime Ceftriaxone Ciprofloxacin Ertapenem   09/25/24 Urine from Clean Catch/Voided Escherichia coli  S  R  I  R  R  R  R  R  R  R  R  S     Collected Specimen Info Organism Gentamicin Meropenem Nitrofurantoin Piperacillin/Tazobactam Trimethoprim/Sulfamethoxazole   09/25/24 Urine from Clean Catch/Voided Escherichia coli  S  S  S  S  R       No further follow up needed from EDPD Team.     Rosi Marc, PharmD

## 2024-12-29 ENCOUNTER — APPOINTMENT (OUTPATIENT)
Dept: RADIOLOGY | Facility: HOSPITAL | Age: 84
End: 2024-12-29
Payer: MEDICARE

## 2024-12-29 ENCOUNTER — HOSPITAL ENCOUNTER (INPATIENT)
Facility: HOSPITAL | Age: 84
LOS: 2 days | Discharge: NURSING, REHAB OR LONG-TERM CARE | End: 2024-12-31
Attending: STUDENT IN AN ORGANIZED HEALTH CARE EDUCATION/TRAINING PROGRAM | Admitting: INTERNAL MEDICINE
Payer: MEDICARE

## 2024-12-29 ENCOUNTER — APPOINTMENT (OUTPATIENT)
Dept: CARDIOLOGY | Facility: HOSPITAL | Age: 84
End: 2024-12-29
Payer: MEDICARE

## 2024-12-29 DIAGNOSIS — N39.0 URINARY TRACT INFECTION WITHOUT HEMATURIA, SITE UNSPECIFIED: ICD-10-CM

## 2024-12-29 DIAGNOSIS — N30.00 ACUTE CYSTITIS WITHOUT HEMATURIA: ICD-10-CM

## 2024-12-29 DIAGNOSIS — R41.82 ALTERED MENTAL STATUS, UNSPECIFIED ALTERED MENTAL STATUS TYPE: Primary | ICD-10-CM

## 2024-12-29 LAB
ALBUMIN SERPL BCP-MCNC: 3 G/DL (ref 3.4–5)
ALP SERPL-CCNC: 91 U/L (ref 33–136)
ALT SERPL W P-5'-P-CCNC: 5 U/L (ref 7–45)
ANION GAP SERPL CALCULATED.3IONS-SCNC: 16 MMOL/L (ref 10–20)
APPEARANCE UR: ABNORMAL
AST SERPL W P-5'-P-CCNC: 14 U/L (ref 9–39)
BACTERIA #/AREA URNS AUTO: ABNORMAL /HPF
BASOPHILS # BLD AUTO: 0.08 X10*3/UL (ref 0–0.1)
BASOPHILS NFR BLD AUTO: 0.6 %
BILIRUB SERPL-MCNC: 0.5 MG/DL (ref 0–1.2)
BILIRUB UR STRIP.AUTO-MCNC: NEGATIVE MG/DL
BUN SERPL-MCNC: 21 MG/DL (ref 6–23)
CALCIUM SERPL-MCNC: 9.2 MG/DL (ref 8.6–10.3)
CARDIAC TROPONIN I PNL SERPL HS: 11 NG/L (ref 0–13)
CHLORIDE SERPL-SCNC: 100 MMOL/L (ref 98–107)
CO2 SERPL-SCNC: 30 MMOL/L (ref 21–32)
COLOR UR: YELLOW
CREAT SERPL-MCNC: 1 MG/DL (ref 0.5–1.05)
EGFRCR SERPLBLD CKD-EPI 2021: 56 ML/MIN/1.73M*2
EOSINOPHIL # BLD AUTO: 0.09 X10*3/UL (ref 0–0.4)
EOSINOPHIL NFR BLD AUTO: 0.6 %
ERYTHROCYTE [DISTWIDTH] IN BLOOD BY AUTOMATED COUNT: 14.1 % (ref 11.5–14.5)
GLUCOSE SERPL-MCNC: 94 MG/DL (ref 74–99)
GLUCOSE UR STRIP.AUTO-MCNC: NORMAL MG/DL
HCT VFR BLD AUTO: 42.7 % (ref 36–46)
HGB BLD-MCNC: 13.1 G/DL (ref 12–16)
IMM GRANULOCYTES # BLD AUTO: 0.04 X10*3/UL (ref 0–0.5)
IMM GRANULOCYTES NFR BLD AUTO: 0.3 % (ref 0–0.9)
KETONES UR STRIP.AUTO-MCNC: ABNORMAL MG/DL
LEUKOCYTE ESTERASE UR QL STRIP.AUTO: ABNORMAL
LYMPHOCYTES # BLD AUTO: 2.8 X10*3/UL (ref 0.8–3)
LYMPHOCYTES NFR BLD AUTO: 20 %
MCH RBC QN AUTO: 26.7 PG (ref 26–34)
MCHC RBC AUTO-ENTMCNC: 30.7 G/DL (ref 32–36)
MCV RBC AUTO: 87 FL (ref 80–100)
MONOCYTES # BLD AUTO: 1.47 X10*3/UL (ref 0.05–0.8)
MONOCYTES NFR BLD AUTO: 10.5 %
MUCOUS THREADS #/AREA URNS AUTO: ABNORMAL /LPF
NEUTROPHILS # BLD AUTO: 9.49 X10*3/UL (ref 1.6–5.5)
NEUTROPHILS NFR BLD AUTO: 68 %
NITRITE UR QL STRIP.AUTO: NEGATIVE
NRBC BLD-RTO: 0 /100 WBCS (ref 0–0)
PH UR STRIP.AUTO: 5.5 [PH]
PLATELET # BLD AUTO: 402 X10*3/UL (ref 150–450)
POTASSIUM SERPL-SCNC: 4.2 MMOL/L (ref 3.5–5.3)
PROT SERPL-MCNC: 7.7 G/DL (ref 6.4–8.2)
PROT UR STRIP.AUTO-MCNC: ABNORMAL MG/DL
RBC # BLD AUTO: 4.9 X10*6/UL (ref 4–5.2)
RBC # UR STRIP.AUTO: NEGATIVE /UL
RBC #/AREA URNS AUTO: ABNORMAL /HPF
SODIUM SERPL-SCNC: 142 MMOL/L (ref 136–145)
SP GR UR STRIP.AUTO: 1.02
SQUAMOUS #/AREA URNS AUTO: ABNORMAL /HPF
UROBILINOGEN UR STRIP.AUTO-MCNC: ABNORMAL MG/DL
WBC # BLD AUTO: 14 X10*3/UL (ref 4.4–11.3)
WBC #/AREA URNS AUTO: >50 /HPF
WBC CLUMPS #/AREA URNS AUTO: ABNORMAL /HPF

## 2024-12-29 PROCEDURE — 85025 COMPLETE CBC W/AUTO DIFF WBC: CPT | Performed by: STUDENT IN AN ORGANIZED HEALTH CARE EDUCATION/TRAINING PROGRAM

## 2024-12-29 PROCEDURE — 99285 EMERGENCY DEPT VISIT HI MDM: CPT | Mod: 25 | Performed by: STUDENT IN AN ORGANIZED HEALTH CARE EDUCATION/TRAINING PROGRAM

## 2024-12-29 PROCEDURE — 81001 URINALYSIS AUTO W/SCOPE: CPT | Performed by: STUDENT IN AN ORGANIZED HEALTH CARE EDUCATION/TRAINING PROGRAM

## 2024-12-29 PROCEDURE — 2500000004 HC RX 250 GENERAL PHARMACY W/ HCPCS (ALT 636 FOR OP/ED): Performed by: INTERNAL MEDICINE

## 2024-12-29 PROCEDURE — 1100000001 HC PRIVATE ROOM DAILY

## 2024-12-29 PROCEDURE — 96365 THER/PROPH/DIAG IV INF INIT: CPT

## 2024-12-29 PROCEDURE — 93010 ELECTROCARDIOGRAM REPORT: CPT | Performed by: INTERNAL MEDICINE

## 2024-12-29 PROCEDURE — 70450 CT HEAD/BRAIN W/O DYE: CPT

## 2024-12-29 PROCEDURE — 36415 COLL VENOUS BLD VENIPUNCTURE: CPT | Performed by: STUDENT IN AN ORGANIZED HEALTH CARE EDUCATION/TRAINING PROGRAM

## 2024-12-29 PROCEDURE — 87086 URINE CULTURE/COLONY COUNT: CPT | Mod: TRILAB | Performed by: STUDENT IN AN ORGANIZED HEALTH CARE EDUCATION/TRAINING PROGRAM

## 2024-12-29 PROCEDURE — 93005 ELECTROCARDIOGRAM TRACING: CPT

## 2024-12-29 PROCEDURE — 2500000004 HC RX 250 GENERAL PHARMACY W/ HCPCS (ALT 636 FOR OP/ED)

## 2024-12-29 PROCEDURE — 80053 COMPREHEN METABOLIC PANEL: CPT | Performed by: STUDENT IN AN ORGANIZED HEALTH CARE EDUCATION/TRAINING PROGRAM

## 2024-12-29 PROCEDURE — 70450 CT HEAD/BRAIN W/O DYE: CPT | Performed by: STUDENT IN AN ORGANIZED HEALTH CARE EDUCATION/TRAINING PROGRAM

## 2024-12-29 PROCEDURE — 84484 ASSAY OF TROPONIN QUANT: CPT | Performed by: STUDENT IN AN ORGANIZED HEALTH CARE EDUCATION/TRAINING PROGRAM

## 2024-12-29 RX ORDER — POLYETHYLENE GLYCOL 3350 17 G/17G
17 POWDER, FOR SOLUTION ORAL DAILY PRN
Status: DISCONTINUED | OUTPATIENT
Start: 2024-12-29 | End: 2024-12-31 | Stop reason: HOSPADM

## 2024-12-29 RX ORDER — DOCUSATE SODIUM 100 MG/1
100 CAPSULE, LIQUID FILLED ORAL DAILY
Status: DISCONTINUED | OUTPATIENT
Start: 2024-12-29 | End: 2024-12-31 | Stop reason: HOSPADM

## 2024-12-29 RX ORDER — HYDROXYZINE HYDROCHLORIDE 10 MG/1
10 TABLET, FILM COATED ORAL 3 TIMES DAILY
Status: DISCONTINUED | OUTPATIENT
Start: 2024-12-29 | End: 2024-12-31 | Stop reason: HOSPADM

## 2024-12-29 RX ORDER — ACETAMINOPHEN 160 MG/5ML
650 SOLUTION ORAL EVERY 4 HOURS PRN
Status: DISCONTINUED | OUTPATIENT
Start: 2024-12-29 | End: 2024-12-31 | Stop reason: HOSPADM

## 2024-12-29 RX ORDER — ERGOCALCIFEROL 1.25 MG/1
1250 CAPSULE ORAL
Status: DISCONTINUED | OUTPATIENT
Start: 2024-12-29 | End: 2024-12-31 | Stop reason: HOSPADM

## 2024-12-29 RX ORDER — POLYMYXIN B SULFATE AND TRIMETHOPRIM 1; 10000 MG/ML; [USP'U]/ML
1 SOLUTION OPHTHALMIC EVERY 4 HOURS
Status: DISCONTINUED | OUTPATIENT
Start: 2024-12-29 | End: 2024-12-30

## 2024-12-29 RX ORDER — CEFTRIAXONE 1 G/50ML
1 INJECTION, SOLUTION INTRAVENOUS ONCE
Status: COMPLETED | OUTPATIENT
Start: 2024-12-29 | End: 2024-12-29

## 2024-12-29 RX ORDER — PANTOPRAZOLE SODIUM 40 MG/1
40 TABLET, DELAYED RELEASE ORAL
Status: DISCONTINUED | OUTPATIENT
Start: 2024-12-30 | End: 2024-12-31 | Stop reason: HOSPADM

## 2024-12-29 RX ORDER — ONDANSETRON 4 MG/1
4 TABLET, ORALLY DISINTEGRATING ORAL EVERY 8 HOURS PRN
Status: DISCONTINUED | OUTPATIENT
Start: 2024-12-29 | End: 2024-12-31 | Stop reason: HOSPADM

## 2024-12-29 RX ORDER — CARVEDILOL 6.25 MG/1
6.25 TABLET ORAL
Status: DISCONTINUED | OUTPATIENT
Start: 2024-12-30 | End: 2024-12-31 | Stop reason: HOSPADM

## 2024-12-29 RX ORDER — ACETAMINOPHEN 325 MG/1
650 TABLET ORAL EVERY 4 HOURS PRN
Status: DISCONTINUED | OUTPATIENT
Start: 2024-12-29 | End: 2024-12-31 | Stop reason: HOSPADM

## 2024-12-29 RX ORDER — ONDANSETRON HYDROCHLORIDE 2 MG/ML
4 INJECTION, SOLUTION INTRAVENOUS EVERY 8 HOURS PRN
Status: DISCONTINUED | OUTPATIENT
Start: 2024-12-29 | End: 2024-12-31 | Stop reason: HOSPADM

## 2024-12-29 RX ORDER — ACETAMINOPHEN 650 MG/1
650 SUPPOSITORY RECTAL EVERY 4 HOURS PRN
Status: DISCONTINUED | OUTPATIENT
Start: 2024-12-29 | End: 2024-12-31 | Stop reason: HOSPADM

## 2024-12-29 RX ORDER — ASPIRIN 81 MG/1
81 TABLET ORAL DAILY
Status: DISCONTINUED | OUTPATIENT
Start: 2024-12-29 | End: 2024-12-31 | Stop reason: HOSPADM

## 2024-12-29 RX ORDER — TALC
3 POWDER (GRAM) TOPICAL NIGHTLY PRN
Status: DISCONTINUED | OUTPATIENT
Start: 2024-12-29 | End: 2024-12-31 | Stop reason: HOSPADM

## 2024-12-29 RX ORDER — ATORVASTATIN CALCIUM 40 MG/1
40 TABLET, FILM COATED ORAL NIGHTLY
Status: DISCONTINUED | OUTPATIENT
Start: 2024-12-29 | End: 2024-12-31 | Stop reason: HOSPADM

## 2024-12-29 RX ORDER — BISACODYL 5 MG
10 TABLET, DELAYED RELEASE (ENTERIC COATED) ORAL DAILY PRN
Status: DISCONTINUED | OUTPATIENT
Start: 2024-12-29 | End: 2024-12-31 | Stop reason: HOSPADM

## 2024-12-29 RX ORDER — ISOSORBIDE MONONITRATE 30 MG/1
30 TABLET, EXTENDED RELEASE ORAL DAILY
Status: DISCONTINUED | OUTPATIENT
Start: 2024-12-29 | End: 2024-12-31 | Stop reason: HOSPADM

## 2024-12-29 RX ORDER — ACETAMINOPHEN 500 MG
10 TABLET ORAL NIGHTLY
Status: DISCONTINUED | OUTPATIENT
Start: 2024-12-29 | End: 2024-12-31 | Stop reason: HOSPADM

## 2024-12-29 RX ORDER — BISACODYL 10 MG/1
10 SUPPOSITORY RECTAL DAILY PRN
Status: DISCONTINUED | OUTPATIENT
Start: 2024-12-29 | End: 2024-12-31 | Stop reason: HOSPADM

## 2024-12-29 RX ORDER — LEVOTHYROXINE SODIUM 100 UG/1
100 TABLET ORAL DAILY
Status: DISCONTINUED | OUTPATIENT
Start: 2024-12-30 | End: 2024-12-31 | Stop reason: HOSPADM

## 2024-12-29 RX ORDER — IPRATROPIUM BROMIDE AND ALBUTEROL SULFATE 2.5; .5 MG/3ML; MG/3ML
3 SOLUTION RESPIRATORY (INHALATION) EVERY 4 HOURS PRN
Status: DISCONTINUED | OUTPATIENT
Start: 2024-12-29 | End: 2024-12-31 | Stop reason: HOSPADM

## 2024-12-29 RX ORDER — ENOXAPARIN SODIUM 100 MG/ML
40 INJECTION SUBCUTANEOUS DAILY
Status: DISCONTINUED | OUTPATIENT
Start: 2024-12-29 | End: 2024-12-31 | Stop reason: HOSPADM

## 2024-12-29 RX ORDER — CEFTRIAXONE 1 G/50ML
1 INJECTION, SOLUTION INTRAVENOUS EVERY 24 HOURS
Status: DISCONTINUED | OUTPATIENT
Start: 2024-12-30 | End: 2024-12-31

## 2024-12-29 RX ADMIN — ENOXAPARIN SODIUM 40 MG: 40 INJECTION SUBCUTANEOUS at 23:03

## 2024-12-29 RX ADMIN — CEFTRIAXONE SODIUM 1 G: 1 INJECTION, SOLUTION INTRAVENOUS at 18:01

## 2024-12-29 ASSESSMENT — PAIN - FUNCTIONAL ASSESSMENT: PAIN_FUNCTIONAL_ASSESSMENT: 0-10

## 2024-12-29 ASSESSMENT — PAIN SCALES - GENERAL: PAINLEVEL_OUTOF10: 0 - NO PAIN

## 2024-12-29 NOTE — ED PROVIDER NOTES
HPI   Chief Complaint   Patient presents with    Altered Mental Status     X2-3 days from facility        HPI  Patient is an 84-year-old female coming from her skilled nursing facility for evaluation of altered mental status.  Facility states that she has been increasingly confused over the past 2 to 3 days.  Patient really is not able to provide much to history.  Family is at bedside and states that she does appear somewhat more confused and she has history of urinary tract infections.  Patient denies chest pain, shortness of breath and is on 2 to 3 L of oxygen at baseline.  She otherwise has no acute complaints.  No reported falls or injuries.  Patient has no acute complaints at this time.      Patient History   History reviewed. No pertinent past medical history.  History reviewed. No pertinent surgical history.  No family history on file.  Social History     Tobacco Use    Smoking status: Former     Types: Cigarettes     Passive exposure: Past    Smokeless tobacco: Never   Vaping Use    Vaping status: Former   Substance Use Topics    Alcohol use: Never    Drug use: Never       Physical Exam   ED Triage Vitals [12/29/24 1644]   Temperature Heart Rate Respirations BP   37.1 °C (98.8 °F) 90 20 (!) 121/96      Pulse Ox Temp src Heart Rate Source Patient Position   97 % -- -- --      BP Location FiO2 (%)     -- --       Physical Exam  Constitutional:       Comments: Chronically ill-appearing but in no apparent distress   HENT:      Head: Normocephalic.   Eyes:      Extraocular Movements: Extraocular movements intact.      Pupils: Pupils are equal, round, and reactive to light.   Cardiovascular:      Rate and Rhythm: Normal rate. Rhythm irregular.      Heart sounds: Normal heart sounds.   Pulmonary:      Effort: Pulmonary effort is normal. No respiratory distress.      Breath sounds: Normal breath sounds.      Comments: On baseline 3 L of oxygen  Abdominal:      General: There is no distension.      Palpations:  Abdomen is soft.      Tenderness: There is no abdominal tenderness.   Skin:     General: Skin is warm and dry.   Neurological:      Mental Status: She is alert. She is confused.      GCS: GCS eye subscore is 4. GCS verbal subscore is 5. GCS motor subscore is 6.           ED Course & MDM   ED Course as of 12/29/24 1930   Sun Dec 29, 2024   1655 EKG Time:1652  EKG Interpretation time:1655  EKG Interpretation: EKG shows atrial fibrillation with premature ventricular responses with a rate of 98 bpm, normal axis, QTc 482, no evidence of STEMI.    EKG was interpreted by myself independently [JL]   1925 Patient accepted by Dr. Trejo for care [JJ]      ED Course User Index  [JJ] Norma Mcadams PA-C  [JL] Ralph Scanlon DO         Diagnoses as of 12/29/24 1930   Altered mental status, unspecified altered mental status type   Urinary tract infection without hematuria, site unspecified                 No data recorded     Josef Coma Scale Score: 13 (12/29/24 1646 : Mely Escobedo RN)                           Medical Decision Making  Parts of this chart have been completed using voice recognition software. Please excuse any errors of transcription.  My thought process and reason for plan has been formulated from the time that I saw the patient until the time of disposition and is not specific to one specific moment during their visit and furthermore my MDM encompasses this entire chart and not only this text box.      HPI: Detailed above.    Exam: A medically appropriate exam performed, outlined above, given the known history and presentation.    History obtained from: Patient, family    EKG: Interpreted by attending physician and reviewed by me    Social Determinants of Health considered during this visit: Resides at skilled nursing facility    Medications given during visit:  Medications   cefTRIAXone (Rocephin) 1 g in dextrose (iso) IV 50 mL (0 g intravenous Stopped 12/29/24 1522)        Diagnostic/tests  Labs Reviewed    CBC WITH AUTO DIFFERENTIAL - Abnormal       Result Value    WBC 14.0 (*)     nRBC 0.0      RBC 4.90      Hemoglobin 13.1      Hematocrit 42.7      MCV 87      MCH 26.7      MCHC 30.7 (*)     RDW 14.1      Platelets 402      Neutrophils % 68.0      Immature Granulocytes %, Automated 0.3      Lymphocytes % 20.0      Monocytes % 10.5      Eosinophils % 0.6      Basophils % 0.6      Neutrophils Absolute 9.49 (*)     Immature Granulocytes Absolute, Automated 0.04      Lymphocytes Absolute 2.80      Monocytes Absolute 1.47 (*)     Eosinophils Absolute 0.09      Basophils Absolute 0.08     COMPREHENSIVE METABOLIC PANEL - Abnormal    Glucose 94      Sodium 142      Potassium 4.2      Chloride 100      Bicarbonate 30      Anion Gap 16      Urea Nitrogen 21      Creatinine 1.00      eGFR 56 (*)     Calcium 9.2      Albumin 3.0 (*)     Alkaline Phosphatase 91      Total Protein 7.7      AST 14      Bilirubin, Total 0.5      ALT 5 (*)    URINALYSIS WITH REFLEX CULTURE AND MICROSCOPIC - Abnormal    Color, Urine Yellow      Appearance, Urine Ex.Turbid (*)     Specific Gravity, Urine 1.019      pH, Urine 5.5      Protein, Urine 50 (1+) (*)     Glucose, Urine Normal      Blood, Urine NEGATIVE      Ketones, Urine 10 (1+) (*)     Bilirubin, Urine NEGATIVE      Urobilinogen, Urine 3 (1+) (*)     Nitrite, Urine NEGATIVE      Leukocyte Esterase, Urine 500 Barrie/µL (*)    MICROSCOPIC ONLY, URINE - Abnormal    WBC, Urine >50 (*)     WBC Clumps, Urine MANY      RBC, Urine 1-2      Squamous Epithelial Cells, Urine 26-50 (1+)      Bacteria, Urine 1+ (*)     Mucus, Urine FEW     TROPONIN I, HIGH SENSITIVITY - Normal    Troponin I, High Sensitivity 11      Narrative:     Less than 99th percentile of normal range cutoff-  Female and children under 18 years old <14 ng/L; Male <21 ng/L: Negative  Repeat testing should be performed if clinically indicated.     Female and children under 18 years old 14-50 ng/L; Male 21-50 ng/L:  Consistent with  possible cardiac damage and possible increased clinical   risk. Serial measurements may help to assess extent of myocardial damage.     >50 ng/L: Consistent with cardiac damage, increased clinical risk and  myocardial infarction. Serial measurements may help assess extent of   myocardial damage.      NOTE: Children less than 1 year old may have higher baseline troponin   levels and results should be interpreted in conjunction with the overall   clinical context.     NOTE: Troponin I testing is performed using a different   testing methodology at [facility] than at other   [facility]. Direct result comparisons should only   be made within the same method.   URINE CULTURE   URINALYSIS WITH REFLEX CULTURE AND MICROSCOPIC    Narrative:     The following orders were created for panel order Urinalysis with Reflex Culture and Microscopic.  Procedure                               Abnormality         Status                     ---------                               -----------         ------                     Urinalysis with Reflex C...[[id number]]  Abnormal            Final result               Extra Urine Gray Tube[[id number]]                                                         Please view results for these tests on the individual orders.   EXTRA URINE GRAY TUBE      CT head wo IV contrast   Final Result   No acute intracranial abnormality.        Moderate-severe supratentorial chronic small vessel ischemic disease   and diffuse supratentorial and infratentorial volume loss.        MACRO:   None.        Signed by: Sreekanth Lynn 12/29/2024 6:45 PM   Dictation workstation:   [id number]           Considerations/further MDM:  Patient is a 84-year-old female presenting for evaluation of increased confusion    I saw this patient in conjunction with Dr. Scanlon.    Patient is awake and alert though is confused but is communicating with examiner and does follow commands.  She has no evidence of airway  compromise or respiratory distress and is on her baseline 2 to 3 L.  Pulses are equal bilaterally.  Heart and lung sounds are benign.  Abdomen is soft, nontender nondistended on exam.  The patient has no focal neurologic deficits.  Laboratory workup is significant for leukocytosis but otherwise unremarkable.  Urinalysis is suggestive of urinary tract infection.  Patient is placed on ceftriaxone for therapy.  She is not clinically septic appearing.  Vital signs remain within normal limits.  CT head is without any mass, midline shift, edema or hemorrhage.  Patient is admitted to Dr. Trejo for further management of her urinary tract infection and altered mental status.    Procedure  Procedures     Norma Mcadams PA-C  12/29/24 1930

## 2024-12-30 PROBLEM — I89.0 LYMPHEDEMA: Status: ACTIVE | Noted: 2018-09-11

## 2024-12-30 PROBLEM — N18.31 STAGE 3A CHRONIC KIDNEY DISEASE (MULTI): Status: ACTIVE | Noted: 2022-12-25

## 2024-12-30 PROBLEM — N39.0 UTI (URINARY TRACT INFECTION): Status: ACTIVE | Noted: 2024-12-30

## 2024-12-30 PROBLEM — I50.9 CONGESTIVE HEART FAILURE: Status: ACTIVE | Noted: 2024-12-30

## 2024-12-30 PROBLEM — E78.5 HYPERLIPIDEMIA: Status: ACTIVE | Noted: 2020-05-26

## 2024-12-30 LAB
ANION GAP SERPL CALCULATED.3IONS-SCNC: 15 MMOL/L (ref 10–20)
ATRIAL RATE: 102 BPM
BUN SERPL-MCNC: 22 MG/DL (ref 6–23)
CALCIUM SERPL-MCNC: 9 MG/DL (ref 8.6–10.3)
CHLORIDE SERPL-SCNC: 101 MMOL/L (ref 98–107)
CO2 SERPL-SCNC: 32 MMOL/L (ref 21–32)
CREAT SERPL-MCNC: 1.06 MG/DL (ref 0.5–1.05)
EGFRCR SERPLBLD CKD-EPI 2021: 52 ML/MIN/1.73M*2
ERYTHROCYTE [DISTWIDTH] IN BLOOD BY AUTOMATED COUNT: 14.3 % (ref 11.5–14.5)
GLUCOSE SERPL-MCNC: 98 MG/DL (ref 74–99)
HCT VFR BLD AUTO: 41.1 % (ref 36–46)
HGB BLD-MCNC: 12.4 G/DL (ref 12–16)
MCH RBC QN AUTO: 26.8 PG (ref 26–34)
MCHC RBC AUTO-ENTMCNC: 30.2 G/DL (ref 32–36)
MCV RBC AUTO: 89 FL (ref 80–100)
NRBC BLD-RTO: 0 /100 WBCS (ref 0–0)
PLATELET # BLD AUTO: 408 X10*3/UL (ref 150–450)
POTASSIUM SERPL-SCNC: 3.9 MMOL/L (ref 3.5–5.3)
Q ONSET: 221 MS
QRS COUNT: 16 BEATS
QRS DURATION: 88 MS
QT INTERVAL: 378 MS
QTC CALCULATION(BAZETT): 482 MS
QTC FREDERICIA: 445 MS
R AXIS: 62 DEGREES
RBC # BLD AUTO: 4.63 X10*6/UL (ref 4–5.2)
SODIUM SERPL-SCNC: 144 MMOL/L (ref 136–145)
T AXIS: 255 DEGREES
T OFFSET: 410 MS
VENTRICULAR RATE: 98 BPM
WBC # BLD AUTO: 14.8 X10*3/UL (ref 4.4–11.3)

## 2024-12-30 PROCEDURE — 85027 COMPLETE CBC AUTOMATED: CPT | Performed by: INTERNAL MEDICINE

## 2024-12-30 PROCEDURE — 1100000001 HC PRIVATE ROOM DAILY

## 2024-12-30 PROCEDURE — 36415 COLL VENOUS BLD VENIPUNCTURE: CPT | Performed by: INTERNAL MEDICINE

## 2024-12-30 PROCEDURE — 80048 BASIC METABOLIC PNL TOTAL CA: CPT | Performed by: INTERNAL MEDICINE

## 2024-12-30 PROCEDURE — 2500000004 HC RX 250 GENERAL PHARMACY W/ HCPCS (ALT 636 FOR OP/ED): Performed by: INTERNAL MEDICINE

## 2024-12-30 RX ADMIN — CEFTRIAXONE SODIUM 1 G: 1 INJECTION, SOLUTION INTRAVENOUS at 18:03

## 2024-12-30 SDOH — SOCIAL STABILITY: SOCIAL INSECURITY: WITHIN THE LAST YEAR, HAVE YOU BEEN AFRAID OF YOUR PARTNER OR EX-PARTNER?: PATIENT UNABLE TO ANSWER

## 2024-12-30 SDOH — ECONOMIC STABILITY: FOOD INSECURITY: HOW HARD IS IT FOR YOU TO PAY FOR THE VERY BASICS LIKE FOOD, HOUSING, MEDICAL CARE, AND HEATING?: NOT VERY HARD

## 2024-12-30 SDOH — ECONOMIC STABILITY: HOUSING INSECURITY: AT ANY TIME IN THE PAST 12 MONTHS, WERE YOU HOMELESS OR LIVING IN A SHELTER (INCLUDING NOW)?: NO

## 2024-12-30 SDOH — ECONOMIC STABILITY: HOUSING INSECURITY: IN THE LAST 12 MONTHS, WAS THERE A TIME WHEN YOU WERE NOT ABLE TO PAY THE MORTGAGE OR RENT ON TIME?: NO

## 2024-12-30 SDOH — SOCIAL STABILITY: SOCIAL INSECURITY
WITHIN THE LAST YEAR, HAVE YOU BEEN KICKED, HIT, SLAPPED, OR OTHERWISE PHYSICALLY HURT BY YOUR PARTNER OR EX-PARTNER?: PATIENT UNABLE TO ANSWER

## 2024-12-30 SDOH — SOCIAL STABILITY: SOCIAL INSECURITY: ABUSE: ADULT

## 2024-12-30 SDOH — SOCIAL STABILITY: SOCIAL INSECURITY
WITHIN THE LAST YEAR, HAVE YOU BEEN RAPED OR FORCED TO HAVE ANY KIND OF SEXUAL ACTIVITY BY YOUR PARTNER OR EX-PARTNER?: PATIENT UNABLE TO ANSWER

## 2024-12-30 SDOH — ECONOMIC STABILITY: FOOD INSECURITY: WITHIN THE PAST 12 MONTHS, THE FOOD YOU BOUGHT JUST DIDN'T LAST AND YOU DIDN'T HAVE MONEY TO GET MORE.: NEVER TRUE

## 2024-12-30 SDOH — SOCIAL STABILITY: SOCIAL INSECURITY: DO YOU FEEL UNSAFE GOING BACK TO THE PLACE WHERE YOU ARE LIVING?: UNABLE TO ASSESS

## 2024-12-30 SDOH — ECONOMIC STABILITY: FOOD INSECURITY: WITHIN THE PAST 12 MONTHS, YOU WORRIED THAT YOUR FOOD WOULD RUN OUT BEFORE YOU GOT THE MONEY TO BUY MORE.: NEVER TRUE

## 2024-12-30 SDOH — ECONOMIC STABILITY: TRANSPORTATION INSECURITY: IN THE PAST 12 MONTHS, HAS LACK OF TRANSPORTATION KEPT YOU FROM MEDICAL APPOINTMENTS OR FROM GETTING MEDICATIONS?: NO

## 2024-12-30 SDOH — SOCIAL STABILITY: SOCIAL INSECURITY: WERE YOU ABLE TO COMPLETE ALL THE BEHAVIORAL HEALTH SCREENINGS?: NO

## 2024-12-30 SDOH — ECONOMIC STABILITY: HOUSING INSECURITY: IN THE PAST 12 MONTHS, HOW MANY TIMES HAVE YOU MOVED WHERE YOU WERE LIVING?: 1

## 2024-12-30 SDOH — ECONOMIC STABILITY: INCOME INSECURITY: IN THE PAST 12 MONTHS HAS THE ELECTRIC, GAS, OIL, OR WATER COMPANY THREATENED TO SHUT OFF SERVICES IN YOUR HOME?: NO

## 2024-12-30 SDOH — SOCIAL STABILITY: SOCIAL INSECURITY: DO YOU FEEL ANYONE HAS EXPLOITED OR TAKEN ADVANTAGE OF YOU FINANCIALLY OR OF YOUR PERSONAL PROPERTY?: UNABLE TO ASSESS

## 2024-12-30 SDOH — SOCIAL STABILITY: SOCIAL INSECURITY: HAVE YOU HAD ANY THOUGHTS OF HARMING ANYONE ELSE?: UNABLE TO ASSESS

## 2024-12-30 SDOH — SOCIAL STABILITY: SOCIAL INSECURITY: HAS ANYONE EVER THREATENED TO HURT YOUR FAMILY OR YOUR PETS?: UNABLE TO ASSESS

## 2024-12-30 SDOH — SOCIAL STABILITY: SOCIAL INSECURITY: DOES ANYONE TRY TO KEEP YOU FROM HAVING/CONTACTING OTHER FRIENDS OR DOING THINGS OUTSIDE YOUR HOME?: UNABLE TO ASSESS

## 2024-12-30 SDOH — SOCIAL STABILITY: SOCIAL INSECURITY: HAVE YOU HAD THOUGHTS OF HARMING ANYONE ELSE?: UNABLE TO ASSESS

## 2024-12-30 SDOH — SOCIAL STABILITY: SOCIAL INSECURITY: ARE YOU OR HAVE YOU BEEN THREATENED OR ABUSED PHYSICALLY, EMOTIONALLY, OR SEXUALLY BY ANYONE?: UNABLE TO ASSESS

## 2024-12-30 SDOH — SOCIAL STABILITY: SOCIAL INSECURITY: ARE THERE ANY APPARENT SIGNS OF INJURIES/BEHAVIORS THAT COULD BE RELATED TO ABUSE/NEGLECT?: UNABLE TO ASSESS

## 2024-12-30 SDOH — SOCIAL STABILITY: SOCIAL INSECURITY
WITHIN THE LAST YEAR, HAVE YOU BEEN HUMILIATED OR EMOTIONALLY ABUSED IN OTHER WAYS BY YOUR PARTNER OR EX-PARTNER?: PATIENT UNABLE TO ANSWER

## 2024-12-30 ASSESSMENT — PATIENT HEALTH QUESTIONNAIRE - PHQ9
1. LITTLE INTEREST OR PLEASURE IN DOING THINGS: NOT AT ALL
SUM OF ALL RESPONSES TO PHQ9 QUESTIONS 1 & 2: 0
2. FEELING DOWN, DEPRESSED OR HOPELESS: NOT AT ALL

## 2024-12-30 ASSESSMENT — ACTIVITIES OF DAILY LIVING (ADL)
DRESSING YOURSELF: DEPENDENT
JUDGMENT_ADEQUATE_SAFELY_COMPLETE_DAILY_ACTIVITIES: NO
LACK_OF_TRANSPORTATION: NO
TOILETING: DEPENDENT
HEARING - RIGHT EAR: UNABLE TO ASSESS
ASSISTIVE_DEVICE: OTHER (COMMENT)
BATHING: DEPENDENT
GROOMING: DEPENDENT
WALKS IN HOME: UNABLE TO ASSESS
LACK_OF_TRANSPORTATION: NO
PATIENT'S MEMORY ADEQUATE TO SAFELY COMPLETE DAILY ACTIVITIES?: NO
FEEDING YOURSELF: DEPENDENT
ADEQUATE_TO_COMPLETE_ADL: UNABLE TO ASSESS
HEARING - LEFT EAR: UNABLE TO ASSESS

## 2024-12-30 ASSESSMENT — COGNITIVE AND FUNCTIONAL STATUS - GENERAL
HELP NEEDED FOR BATHING: A LOT
PATIENT BASELINE BEDBOUND: UNABLE TO ASSESS AT THIS TIME
MOVING TO AND FROM BED TO CHAIR: A LOT
STANDING UP FROM CHAIR USING ARMS: A LOT
MOBILITY SCORE: 12
TURNING FROM BACK TO SIDE WHILE IN FLAT BAD: A LOT
PATIENT BASELINE BEDBOUND: UNABLE TO ASSESS AT THIS TIME
DRESSING REGULAR UPPER BODY CLOTHING: A LOT
DRESSING REGULAR LOWER BODY CLOTHING: A LOT
EATING MEALS: A LOT
DAILY ACTIVITIY SCORE: 12
CLIMB 3 TO 5 STEPS WITH RAILING: A LOT
PERSONAL GROOMING: A LOT
PATIENT BASELINE BEDBOUND: UNABLE TO ASSESS AT THIS TIME
TOILETING: A LOT
WALKING IN HOSPITAL ROOM: A LOT
MOVING FROM LYING ON BACK TO SITTING ON SIDE OF FLAT BED WITH BEDRAILS: A LOT

## 2024-12-30 ASSESSMENT — PAIN SCALES - PAIN ASSESSMENT IN ADVANCED DEMENTIA (PAINAD)
FACIALEXPRESSION: SMILING OR INEXPRESSIVE
CONSOLABILITY: NO NEED TO CONSOLE
TOTALSCORE: 0
BREATHING: NORMAL
BODYLANGUAGE: RELAXED

## 2024-12-30 ASSESSMENT — LIFESTYLE VARIABLES
HOW OFTEN DO YOU HAVE 6 OR MORE DRINKS ON ONE OCCASION: NEVER
AUDIT-C TOTAL SCORE: 0
HOW MANY STANDARD DRINKS CONTAINING ALCOHOL DO YOU HAVE ON A TYPICAL DAY: PATIENT DOES NOT DRINK
SKIP TO QUESTIONS 9-10: 1
AUDIT-C TOTAL SCORE: 0
HOW OFTEN DO YOU HAVE A DRINK CONTAINING ALCOHOL: NEVER

## 2024-12-30 ASSESSMENT — PAIN SCALES - GENERAL
PAINLEVEL_OUTOF10: 0 - NO PAIN
PAINLEVEL_OUTOF10: 0 - NO PAIN

## 2024-12-30 ASSESSMENT — PAIN - FUNCTIONAL ASSESSMENT: PAIN_FUNCTIONAL_ASSESSMENT: PAINAD (PAIN ASSESSMENT IN ADVANCED DEMENTIA SCALE)

## 2024-12-30 NOTE — H&P
HISTORY AND PHYSICAL EXAMINATION - INTERNAL MEDICINE    PATIENT NAME: Morena Joshi    MRN: [id number]  SERVICE DATE: 12/30/2024       PRIMARY CARE PHYSICIAN:  Rishi Casas MD    ASSESSMENT & PLAN    Assessment & Plan  Altered mental status, unspecified altered mental status type  CT unremarkable   +UA , leukocytosis - ATB   Stage 3a chronic kidney disease (Multi)  Slight earl   Monitor   Lymphedema  Chronic   Hyperlipidemia  Cont statin  Congestive heart failure  Cont home meds  Chronic O2 pta   UTI (urinary tract infection)  Cont atb await cx     Continue current medications.  Supportive care.  Physical therapy and Occupational Therapy -as needed.   Continue to monitor labs. Continue DVT, aspiration decubitus precautions.      Dispo - TBD            SUBJECTIVE  CHIEF COMPLAINT:  confusion     HISTORY OF PRESENT ILLNESS:  Ms. Joshi is a 84 y.o. female who presents with confusion from NH. No complaints during exam. Pleasantly confused     PAST MEDICAL HISTORY:  History reviewed. No pertinent past medical history.    PAST SURGICAL HISTORY:  History reviewed. No pertinent surgical history.    FAMILY HISTORY:  No family history on file.    SOCIAL HISTORY:    Social History     Socioeconomic History    Marital status:      Spouse name: Not on file    Number of children: Not on file    Years of education: Not on file    Highest education level: Not on file   Occupational History    Not on file   Tobacco Use    Smoking status: Former     Types: Cigarettes     Passive exposure: Past    Smokeless tobacco: Never   Vaping Use    Vaping status: Former   Substance and Sexual Activity    Alcohol use: Never    Drug use: Never    Sexual activity: Not Currently   Other Topics Concern    Not on file   Social History Narrative    Not on file     Social Drivers of Health     Financial Resource Strain: Low Risk  (12/30/2024)    Overall Financial Resource Strain (CARDIA)     Difficulty of Paying Living Expenses: Not very  hard   Food Insecurity: No Food Insecurity (12/30/2024)    Hunger Vital Sign     Worried About Running Out of Food in the Last Year: Never true     Ran Out of Food in the Last Year: Never true   Transportation Needs: No Transportation Needs (12/30/2024)    PRAPARE - Transportation     Lack of Transportation (Medical): No     Lack of Transportation (Non-Medical): No   Physical Activity: Inactive (5/28/2024)    Exercise Vital Sign     Days of Exercise per Week: 0 days     Minutes of Exercise per Session: 0 min   Stress: Not on file   Social Connections: Not on file   Intimate Partner Violence: Patient Unable To Answer (12/30/2024)    Humiliation, Afraid, Rape, and Kick questionnaire     Fear of Current or Ex-Partner: Patient unable to answer     Emotionally Abused: Patient unable to answer     Physically Abused: Patient unable to answer     Sexually Abused: Patient unable to answer   Housing Stability: Low Risk  (12/30/2024)    Housing Stability Vital Sign     Unable to Pay for Housing in the Last Year: No     Number of Times Moved in the Last Year: 1     Homeless in the Last Year: No             MEDICATIONS:  Medications Prior to Admission   Medication Sig Dispense Refill Last Dose/Taking    acetaminophen (Tylenol) 325 mg tablet Take 2 tablets (650 mg) by mouth every 8 hours if needed for mild pain (1 - 3).       aspirin 81 mg EC tablet Take 1 tablet (81 mg) by mouth once daily.       atorvastatin (Lipitor) 40 mg tablet Take 1 tablet (40 mg) by mouth once daily at bedtime.       bisacodyl (Dulcolax) 10 mg suppository Insert 1 suppository (10 mg) into the rectum once daily as needed for constipation.       carvedilol (Coreg) 6.25 mg tablet Take 1 tablet (6.25 mg) by mouth 2 times daily (morning and late afternoon).       docusate sodium (Colace) 100 mg capsule Take 1 capsule (100 mg) by mouth once daily.       ergocalciferol (Vitamin D-2) 1.25 MG (75066 UT) capsule Take 1 capsule (1,250 mcg) by mouth 1 (one) time per  week.       hydrALAZINE (Apresoline) 20 mg/mL injection Infuse 0.5 mL (10 mg) into a venous catheter every 6 hours if needed (SBP >160).       hydrOXYzine HCL (Atarax) 10 mg tablet Take 1 tablet (10 mg) by mouth 3 times a day.       ipratropium-albuteroL (Duo-Neb) 0.5-2.5 mg/3 mL nebulizer solution Take 3 mL by nebulization every 4 hours if needed for wheezing or shortness of breath.       isosorbide mononitrate ER (Imdur) 30 mg 24 hr tablet Take 1 tablet (30 mg) by mouth once daily. Do not crush or chew.       levothyroxine (Synthroid, Levoxyl) 100 mcg tablet Take 1 tablet (100 mcg) by mouth early in the morning.. Take on an empty stomach at the same time each day, either 30 to 60 minutes prior to breakfast       melatonin 10 mg tablet Take 5 mg by mouth once daily at bedtime.       nystatin (Mycostatin) 100,000 unit/gram powder Apply 1 Application topically 2 times a day. Apply to abd folds, babar breasts       omeprazole OTC (PriLOSEC OTC) 20 mg EC tablet Take 1 tablet (20 mg) by mouth 2 times a day before meals. Do not crush, chew, or split.       ondansetron (Zofran) 4 mg tablet Take 1 tablet (4 mg) by mouth every 6 hours if needed for nausea or vomiting.       oxygen (O2) gas therapy Inhale 6 L/min every 12 hours.       polyethylene glycol (Glycolax, Miralax) 17 gram packet Take 17 g by mouth once daily as needed (constipation).       polymyxin B sulf-trimethoprim (Polytrim) ophthalmic solution 1 drop every 4 hours.            ALLERGIES:  Allergies   Allergen Reactions    Clarithromycin Unknown    Clinoril [Sulindac] Unknown    Codeine Phosphate Unknown    Doxycycline Unknown    Feldene [Piroxicam] Unknown    Latex, Natural Rubber Dermatitis    Macrobid [Nitrofurantoin Monohyd/M-Cryst] Unknown    Sulfa (Sulfonamide Antibiotics) Unknown    Tetracyclines Unknown       OBJECTIVE:   REVIEW OF SYSTEMS:     All systems reviewed and negative except mentioned in HPI      /84 (BP Location: Right arm, Patient  "Position: Lying)   Pulse 105   Temp 36.5 °C (97.7 °F) (Temporal)   Resp 15   Ht 1.575 m (5' 2.01\")   Wt 86.2 kg (190 lb 0.6 oz)   LMP  (LMP Unknown)   SpO2 97%   BMI 34.75 kg/m²       General: Awake and alert, in no distress  Neck : Supple, ROM nl   Cardiac: S1S2 wnl, no MGR  Lungs: Clear to auscultation, no Rales    Abdomen: Soft non-tender, non-distended  Extremities: edema     DATA:   Results for orders placed or performed during the hospital encounter of 12/29/24 (from the past 24 hours)   CBC and Auto Differential   Result Value Ref Range    WBC 14.0 (H) 4.4 - 11.3 x10*3/uL    nRBC 0.0 0.0 - 0.0 /100 WBCs    RBC 4.90 4.00 - 5.20 x10*6/uL    Hemoglobin 13.1 12.0 - 16.0 g/dL    Hematocrit 42.7 36.0 - 46.0 %    MCV 87 80 - 100 fL    MCH 26.7 26.0 - 34.0 pg    MCHC 30.7 (L) 32.0 - 36.0 g/dL    RDW 14.1 11.5 - 14.5 %    Platelets 402 150 - 450 x10*3/uL    Neutrophils % 68.0 40.0 - 80.0 %    Immature Granulocytes %, Automated 0.3 0.0 - 0.9 %    Lymphocytes % 20.0 13.0 - 44.0 %    Monocytes % 10.5 2.0 - 10.0 %    Eosinophils % 0.6 0.0 - 6.0 %    Basophils % 0.6 0.0 - 2.0 %    Neutrophils Absolute 9.49 (H) 1.60 - 5.50 x10*3/uL    Immature Granulocytes Absolute, Automated 0.04 0.00 - 0.50 x10*3/uL    Lymphocytes Absolute 2.80 0.80 - 3.00 x10*3/uL    Monocytes Absolute 1.47 (H) 0.05 - 0.80 x10*3/uL    Eosinophils Absolute 0.09 0.00 - 0.40 x10*3/uL    Basophils Absolute 0.08 0.00 - 0.10 x10*3/uL   Comprehensive metabolic panel   Result Value Ref Range    Glucose 94 74 - 99 mg/dL    Sodium 142 136 - 145 mmol/L    Potassium 4.2 3.5 - 5.3 mmol/L    Chloride 100 98 - 107 mmol/L    Bicarbonate 30 21 - 32 mmol/L    Anion Gap 16 10 - 20 mmol/L    Urea Nitrogen 21 6 - 23 mg/dL    Creatinine 1.00 0.50 - 1.05 mg/dL    eGFR 56 (L) >60 mL/min/1.73m*2    Calcium 9.2 8.6 - 10.3 mg/dL    Albumin 3.0 (L) 3.4 - 5.0 g/dL    Alkaline Phosphatase 91 33 - 136 U/L    Total Protein 7.7 6.4 - 8.2 g/dL    AST 14 9 - 39 U/L    Bilirubin, " Total 0.5 0.0 - 1.2 mg/dL    ALT 5 (L) 7 - 45 U/L   Troponin I, High Sensitivity   Result Value Ref Range    Troponin I, High Sensitivity 11 0 - 13 ng/L   Urinalysis with Reflex Culture and Microscopic   Result Value Ref Range    Color, Urine Yellow Light-Yellow, Yellow, Dark-Yellow    Appearance, Urine Ex.Turbid (N) Clear    Specific Gravity, Urine 1.019 1.005 - 1.035    pH, Urine 5.5 5.0, 5.5, 6.0, 6.5, 7.0, 7.5, 8.0    Protein, Urine 50 (1+) (A) NEGATIVE, 10 (TRACE), 20 (TRACE) mg/dL    Glucose, Urine Normal Normal mg/dL    Blood, Urine NEGATIVE NEGATIVE    Ketones, Urine 10 (1+) (A) NEGATIVE mg/dL    Bilirubin, Urine NEGATIVE NEGATIVE    Urobilinogen, Urine 3 (1+) (A) Normal mg/dL    Nitrite, Urine NEGATIVE NEGATIVE    Leukocyte Esterase, Urine 500 Barrie/µL (A) NEGATIVE   Microscopic Only, Urine   Result Value Ref Range    WBC, Urine >50 (A) 1-5, NONE /HPF    WBC Clumps, Urine MANY Reference range not established. /HPF    RBC, Urine 1-2 NONE, 1-2, 3-5 /HPF    Squamous Epithelial Cells, Urine 26-50 (1+) Reference range not established. /HPF    Bacteria, Urine 1+ (A) NONE SEEN /HPF    Mucus, Urine FEW Reference range not established. /LPF   ECG 12 lead   Result Value Ref Range    Ventricular Rate 98 BPM    Atrial Rate 102 BPM    QRS Duration 88 ms    QT Interval 378 ms    QTC Calculation(Bazett) 482 ms    R Axis 62 degrees    T Axis 255 degrees    QRS Count 16 beats    Q Onset 221 ms    T Offset 410 ms    QTC Fredericia 445 ms   CBC   Result Value Ref Range    WBC 14.8 (H) 4.4 - 11.3 x10*3/uL    nRBC 0.0 0.0 - 0.0 /100 WBCs    RBC 4.63 4.00 - 5.20 x10*6/uL    Hemoglobin 12.4 12.0 - 16.0 g/dL    Hematocrit 41.1 36.0 - 46.0 %    MCV 89 80 - 100 fL    MCH 26.8 26.0 - 34.0 pg    MCHC 30.2 (L) 32.0 - 36.0 g/dL    RDW 14.3 11.5 - 14.5 %    Platelets 408 150 - 450 x10*3/uL   Basic metabolic panel   Result Value Ref Range    Glucose 98 74 - 99 mg/dL    Sodium 144 136 - 145 mmol/L    Potassium 3.9 3.5 - 5.3 mmol/L     Chloride 101 98 - 107 mmol/L    Bicarbonate 32 21 - 32 mmol/L    Anion Gap 15 10 - 20 mmol/L    Urea Nitrogen 22 6 - 23 mg/dL    Creatinine 1.06 (H) 0.50 - 1.05 mg/dL    eGFR 52 (L) >60 mL/min/1.73m*2    Calcium 9.0 8.6 - 10.3 mg/dL           SIGNATURE: Rebecca Duarte, APRN-CNP  DATE: December 30, 2024  TIME: 4:18 PM        I have seen the patient and verified the exam. I have personally reviewed all available data.  I agree with the note as documented with additional comments if needed. The assessment and plan as outlined are reflection of our discussion.    Syd Trejo MD

## 2024-12-30 NOTE — CARE PLAN
Problem: Pain - Adult  Goal: Verbalizes/displays adequate comfort level or baseline comfort level  12/30/2024 1113 by Moi Redd RN  Outcome: Progressing  12/30/2024 0929 by Moi Redd RN  Outcome: Progressing     Problem: Safety - Adult  Goal: Free from fall injury  12/30/2024 1113 by Moi Redd RN  Outcome: Progressing  12/30/2024 0929 by Moi Redd RN  Outcome: Progressing     Problem: Discharge Planning  Goal: Discharge to home or other facility with appropriate resources  12/30/2024 1113 by Moi Redd RN  Outcome: Progressing  12/30/2024 0929 by Moi Redd RN  Outcome: Progressing     Problem: Chronic Conditions and Co-morbidities  Goal: Patient's chronic conditions and co-morbidity symptoms are monitored and maintained or improved  12/30/2024 1113 by Moi Redd RN  Outcome: Progressing  12/30/2024 0929 by Moi Redd RN  Outcome: Progressing   The patient's goals for the shift include      The clinical goals for the shift include safety, IV antibiotics

## 2024-12-30 NOTE — ED NOTES
Pt placed in regular hospital bed for comfort and to help prevent skin breakdown as she will be boarding in the ER throughout the night.      Broderick Tsai RN  12/30/24 0010

## 2024-12-30 NOTE — CARE PLAN
Problem: Pain - Adult  Goal: Verbalizes/displays adequate comfort level or baseline comfort level  Outcome: Progressing     Problem: Safety - Adult  Goal: Free from fall injury  Outcome: Progressing     Problem: Discharge Planning  Goal: Discharge to home or other facility with appropriate resources  Outcome: Progressing     Problem: Chronic Conditions and Co-morbidities  Goal: Patient's chronic conditions and co-morbidity symptoms are monitored and maintained or improved  Outcome: Progressing   The patient's goals for the shift include      The clinical goals for the shift include safety, IV antibiotics

## 2024-12-30 NOTE — ED PROVIDER NOTES
Patient was seen by both myself and advanced practitioner.  I personally saw the patient and made/approved the management plan and take responsibility for the patient management     Patient is an 84-year-old female that presents emergency department for evaluation of altered mental status.  Patient currently at nursing facility and yesterday was found by staff to be more confused.  They were concerned for possible urinary tract infection at that time and patient did reportedly receive an injection of IM antibiotics however today seem to be more confused and drowsy.  She was sent in for further evaluation.  Nursing report did not witness any falls.  She is on 2 to 3 L of oxygen at baseline and is not requiring any increased oxygen use.  Patient is very poor historian however denies any complaints at this time.    On exam patient uncomfortable appearing but in no obvious distress.  She is awake, alert and oriented to self.  Head is normocephalic, atraumatic with no evidence of head trauma.  Neck is supple with no evidence of meningismus.  Lungs are clear to auscultation bilaterally.  Regular rate and rhythm on auscultation of the heart.  She is afebrile at this time.  Abdomen soft, nontender, nondistended.  Blood work ordered including CBC, CMP, urinalysis, troponin, CT scan of the head.  EKG shows atrial fibrillation with premature ventricular responses but no evidence of STEMI or active ischemia.  Blood work was remarkable for leukocytosis with white count of 14 with left shift present concerning for developing infection.  She does have normal x-rays, normal kidney function.  Urinalysis does show evidence of urinary tract infection with leukocyte esterase, 1+ bacteria and significant pyuria.  Troponin was negative at 11 I have low suspicion for cardiac source of patient's symptoms.  CT scan of the head shows moderate to severe supratentorial chronic vessel disease which likely is contributing to patient's confusion  and dementia but no evidence of intracranial hemorrhage or mass.  Given patient's confusion, increased weakness patient will be admitted for continued IV antibiotics at this time as she was given IV ceftriaxone for antibiotic coverage of her urinary tract infection.    I independently interpreted the following study(s) CT scan of the head, EKG which show CT scan of the head shows no evidence of intracranial hemorrhage but does shows moderate to severe supratentorial chronic small vessel ischemic disease.  EKG shows atrial fibrillation with premature ventricular responses but no evidence of STEMI or active ischemia.     Ralph Scanlon, DO  12/29/24 9222

## 2024-12-30 NOTE — ED NOTES
Pt unable to follow commands and perform swallow evaluation, therefore I held all oral medications until pt is able to follow commands and/or pass swallow evaluation.     Additionally, the pt had 2 lumen small bore needles in her right upper thigh, one was dislodged and the other was still in place. They appear to be for continuous subcutaneous infusions, it was not attached to an insulin pump, Dr. Lorenz, ED attending notified and assess and agree they appear to be for subcutaneous infusion, but her nursing home paperwork does not show any such medication that would utilize a subcutaneous continuous infusion.      Broderick Tsai RN  12/30/24 0009

## 2024-12-31 VITALS
BODY MASS INDEX: 34.97 KG/M2 | SYSTOLIC BLOOD PRESSURE: 126 MMHG | OXYGEN SATURATION: 97 % | WEIGHT: 190.04 LBS | DIASTOLIC BLOOD PRESSURE: 77 MMHG | HEART RATE: 85 BPM | RESPIRATION RATE: 18 BRPM | TEMPERATURE: 97.7 F | HEIGHT: 62 IN

## 2024-12-31 LAB
ANION GAP SERPL CALCULATED.3IONS-SCNC: 14 MMOL/L (ref 10–20)
BACTERIA UR CULT: NORMAL
BUN SERPL-MCNC: 22 MG/DL (ref 6–23)
CALCIUM SERPL-MCNC: 9.1 MG/DL (ref 8.6–10.3)
CHLORIDE SERPL-SCNC: 103 MMOL/L (ref 98–107)
CO2 SERPL-SCNC: 29 MMOL/L (ref 21–32)
CREAT SERPL-MCNC: 0.99 MG/DL (ref 0.5–1.05)
EGFRCR SERPLBLD CKD-EPI 2021: 56 ML/MIN/1.73M*2
ERYTHROCYTE [DISTWIDTH] IN BLOOD BY AUTOMATED COUNT: 14.3 % (ref 11.5–14.5)
GLUCOSE SERPL-MCNC: 118 MG/DL (ref 74–99)
HCT VFR BLD AUTO: 46.3 % (ref 36–46)
HGB BLD-MCNC: 13.3 G/DL (ref 12–16)
MCH RBC QN AUTO: 26.3 PG (ref 26–34)
MCHC RBC AUTO-ENTMCNC: 28.7 G/DL (ref 32–36)
MCV RBC AUTO: 92 FL (ref 80–100)
NRBC BLD-RTO: 0 /100 WBCS (ref 0–0)
PLATELET # BLD AUTO: 336 X10*3/UL (ref 150–450)
POTASSIUM SERPL-SCNC: 4.2 MMOL/L (ref 3.5–5.3)
RBC # BLD AUTO: 5.06 X10*6/UL (ref 4–5.2)
SODIUM SERPL-SCNC: 142 MMOL/L (ref 136–145)
WBC # BLD AUTO: 15.3 X10*3/UL (ref 4.4–11.3)

## 2024-12-31 PROCEDURE — 36415 COLL VENOUS BLD VENIPUNCTURE: CPT | Performed by: NURSE PRACTITIONER

## 2024-12-31 PROCEDURE — 97165 OT EVAL LOW COMPLEX 30 MIN: CPT | Mod: GO

## 2024-12-31 PROCEDURE — 2500000001 HC RX 250 WO HCPCS SELF ADMINISTERED DRUGS (ALT 637 FOR MEDICARE OP): Performed by: INTERNAL MEDICINE

## 2024-12-31 PROCEDURE — 2500000001 HC RX 250 WO HCPCS SELF ADMINISTERED DRUGS (ALT 637 FOR MEDICARE OP): Performed by: NURSE PRACTITIONER

## 2024-12-31 PROCEDURE — 2500000005 HC RX 250 GENERAL PHARMACY W/O HCPCS: Performed by: INTERNAL MEDICINE

## 2024-12-31 PROCEDURE — 85027 COMPLETE CBC AUTOMATED: CPT | Performed by: NURSE PRACTITIONER

## 2024-12-31 PROCEDURE — 80048 BASIC METABOLIC PNL TOTAL CA: CPT | Performed by: NURSE PRACTITIONER

## 2024-12-31 PROCEDURE — 2500000002 HC RX 250 W HCPCS SELF ADMINISTERED DRUGS (ALT 637 FOR MEDICARE OP, ALT 636 FOR OP/ED): Performed by: INTERNAL MEDICINE

## 2024-12-31 PROCEDURE — 2500000004 HC RX 250 GENERAL PHARMACY W/ HCPCS (ALT 636 FOR OP/ED): Performed by: INTERNAL MEDICINE

## 2024-12-31 PROCEDURE — 97162 PT EVAL MOD COMPLEX 30 MIN: CPT | Mod: GP

## 2024-12-31 RX ORDER — CEPHALEXIN 500 MG/1
500 CAPSULE ORAL EVERY 8 HOURS SCHEDULED
Status: DISCONTINUED | OUTPATIENT
Start: 2024-12-31 | End: 2024-12-31 | Stop reason: HOSPADM

## 2024-12-31 RX ORDER — CEPHALEXIN 500 MG/1
500 CAPSULE ORAL EVERY 8 HOURS SCHEDULED
Qty: 15 CAPSULE | Refills: 0 | Status: SHIPPED | OUTPATIENT
Start: 2024-12-31 | End: 2025-01-05

## 2024-12-31 RX ADMIN — PANTOPRAZOLE SODIUM 40 MG: 40 TABLET, DELAYED RELEASE ORAL at 05:20

## 2024-12-31 RX ADMIN — CEPHALEXIN 500 MG: 500 CAPSULE ORAL at 15:38

## 2024-12-31 RX ADMIN — ASPIRIN 81 MG: 81 TABLET, COATED ORAL at 08:45

## 2024-12-31 RX ADMIN — ISOSORBIDE MONONITRATE 30 MG: 30 TABLET, EXTENDED RELEASE ORAL at 08:45

## 2024-12-31 RX ADMIN — ENOXAPARIN SODIUM 40 MG: 40 INJECTION SUBCUTANEOUS at 08:45

## 2024-12-31 RX ADMIN — PANTOPRAZOLE SODIUM 40 MG: 40 TABLET, DELAYED RELEASE ORAL at 15:38

## 2024-12-31 RX ADMIN — DOCUSATE SODIUM 100 MG: 100 CAPSULE, LIQUID FILLED ORAL at 08:45

## 2024-12-31 RX ADMIN — Medication 3 L/MIN: at 15:42

## 2024-12-31 RX ADMIN — CARVEDILOL 6.25 MG: 6.25 TABLET, FILM COATED ORAL at 08:45

## 2024-12-31 RX ADMIN — LEVOTHYROXINE SODIUM 100 MCG: 0.1 TABLET ORAL at 05:20

## 2024-12-31 RX ADMIN — HYDROXYZINE HYDROCHLORIDE 10 MG: 10 TABLET ORAL at 15:38

## 2024-12-31 RX ADMIN — CARVEDILOL 6.25 MG: 6.25 TABLET, FILM COATED ORAL at 17:34

## 2024-12-31 RX ADMIN — HYDROXYZINE HYDROCHLORIDE 10 MG: 10 TABLET ORAL at 08:45

## 2024-12-31 ASSESSMENT — ACTIVITIES OF DAILY LIVING (ADL)
ADL_ASSISTANCE: NEEDS ASSISTANCE
BATHING_ASSISTANCE: TOTAL
ADL_ASSISTANCE: NEEDS ASSISTANCE

## 2024-12-31 ASSESSMENT — COGNITIVE AND FUNCTIONAL STATUS - GENERAL
TOILETING: A LOT
MOBILITY SCORE: 12
MOVING FROM LYING ON BACK TO SITTING ON SIDE OF FLAT BED WITH BEDRAILS: TOTAL
WALKING IN HOSPITAL ROOM: A LOT
HELP NEEDED FOR BATHING: A LOT
DAILY ACTIVITIY SCORE: 9
EATING MEALS: A LITTLE
DAILY ACTIVITIY SCORE: 14
HELP NEEDED FOR BATHING: TOTAL
DRESSING REGULAR LOWER BODY CLOTHING: A LOT
STANDING UP FROM CHAIR USING ARMS: TOTAL
MOVING TO AND FROM BED TO CHAIR: A LOT
MOBILITY SCORE: 6
MOVING TO AND FROM BED TO CHAIR: TOTAL
DRESSING REGULAR LOWER BODY CLOTHING: TOTAL
WALKING IN HOSPITAL ROOM: TOTAL
DRESSING REGULAR UPPER BODY CLOTHING: TOTAL
CLIMB 3 TO 5 STEPS WITH RAILING: A LOT
TURNING FROM BACK TO SIDE WHILE IN FLAT BAD: TOTAL
STANDING UP FROM CHAIR USING ARMS: A LOT
TOILETING: TOTAL
PERSONAL GROOMING: A LOT
TURNING FROM BACK TO SIDE WHILE IN FLAT BAD: A LOT
MOVING FROM LYING ON BACK TO SITTING ON SIDE OF FLAT BED WITH BEDRAILS: A LOT
EATING MEALS: A LITTLE
DRESSING REGULAR UPPER BODY CLOTHING: A LOT
CLIMB 3 TO 5 STEPS WITH RAILING: TOTAL
PERSONAL GROOMING: A LITTLE

## 2024-12-31 ASSESSMENT — PAIN SCALES - GENERAL
PAINLEVEL_OUTOF10: 0 - NO PAIN

## 2024-12-31 ASSESSMENT — PAIN - FUNCTIONAL ASSESSMENT
PAIN_FUNCTIONAL_ASSESSMENT: 0-10
PAIN_FUNCTIONAL_ASSESSMENT: 0-10

## 2024-12-31 NOTE — NURSING NOTE
[facility] at this time to give report and no one was available to answer the phone.     18:15 Gave report to [facility] RN. Notified Daughter Rajwinder that patient will be picked up today by 6:15. Notified patient she will be picked up at 6:15.

## 2024-12-31 NOTE — PROGRESS NOTES
12/31/24 1354   Discharge Planning   Living Arrangements Other (Comment)  (Long term Care [facility])   Support Systems Family members;Children   Assistance Needed Dependent in Care   Type of Residence Nursing home/residential care   Do you have animals or pets at home? No   Who is requesting discharge planning? Provider   Home or Post Acute Services Other (Comment)  ([facility] - Long term care)   Expected Discharge Disposition Inter  ([facility])   Does the patient need discharge transport arranged? Yes   RoundTrip coordination needed? Yes   Has discharge transport been arranged? No   Patient Choice   Provider Choice list and CMS website (https://medicare.gov/care-compare#search) for post-acute Quality and Resource Measure Data were provided and reviewed with: Family   Patient / Family choosing to utilize agency / facility established prior to hospitalization Yes  (Resides at [facility])   Stroke Family Assessment   Stroke Family Assessment Needed No     Return to Long term care at [facility] upon discharge.

## 2024-12-31 NOTE — CARE PLAN
Problem: Safety - Adult  Goal: Free from fall injury  Outcome: Progressing   The patient's goals for the shift include      The clinical goals for the shift include Safety

## 2024-12-31 NOTE — CARE PLAN
The patient's goals for the shift include      The clinical goals for the shift include safety and encourage activity      Problem: Pain - Adult  Goal: Verbalizes/displays adequate comfort level or baseline comfort level  Outcome: Progressing     Problem: Safety - Adult  Goal: Free from fall injury  Outcome: Progressing     Problem: Discharge Planning  Goal: Discharge to home or other facility with appropriate resources  Outcome: Progressing     Problem: Chronic Conditions and Co-morbidities  Goal: Patient's chronic conditions and co-morbidity symptoms are monitored and maintained or improved  Outcome: Progressing

## 2024-12-31 NOTE — PROGRESS NOTES
12/31/24 1608   Discharge Planning   Expected Discharge Disposition Inter   Does the patient need discharge transport arranged? Yes   RoundTrip coordination needed? Yes   Has discharge transport been arranged? Yes   What day is the transport expected? 12/31/24   What time is the transport expected? 1815     Final discharge orders sent to [facility] confirms patient can return today.   Transportation arranged for 1815, bedside nurse updated

## 2024-12-31 NOTE — PROGRESS NOTES
Physical Therapy Evaluation    Patient Name: Morena Joshi  MRN: [id number]  Department: [address]  Room: [address] [place] [address]  Today's Date: 12/31/2024   Time Calculation  Start Time: 1315  Stop Time: 1330  Time Calculation (min): 15 min    Assessment/Plan   PT Assessment  PT Assessment Results: Decreased strength, Decreased endurance, Impaired balance, Decreased mobility, Decreased cognition, Impaired judgement, Decreased safety awareness, Obesity, Decreased skin integrity  Rehab Prognosis: Poor  Barriers to Discharge Home:  (Continued 24/7 LTC needs. Dep for mobility and ADLs)  Evaluation/Treatment Tolerance: Patient tolerated treatment well  Medical Staff Made Aware: Yes  Strengths: Support of Caregivers  Barriers to Participation: Ability to acquire knowledge, Attitude of self, Comorbidities, Insight into problems, Premorbid level of function  End of Session Communication: Bedside nurse  Assessment Comment: 84 year old female admits from LTC facility with AMS, CKD, Lymphedema, HLD, CHF, and UTI. Pt is a limited Hx at this time due to poor cognition. Between what info is gathered during eval and the available info in her chart, Pt appears to be dependent for all mobility at baseline? Likely [place] use? PT will DC patient from acute rehab services, however POC may be adjusted if PLOF level information becomes available suggesting an improved mobility status than above. (5/28-6/3/24 Max A x2 bed mobility only in chart)  End of Session Patient Position: Bed, 3 rail up, Alarm on  IP OR SWING BED PT PLAN  Inpatient or Swing Bed: Inpatient  PT Plan  PT Plan: PT Eval only  PT Eval Only Reason: At baseline function  PT Discharge Recommendations: No further acute PT  Equipment Recommended upon Discharge: Lift  PT Recommended Transfer Status: Total assist  PT - OK to Discharge: Yes    Subjective   General Visit Information:  General  Reason for Referral: Impaired Mobility  Referred By: Dr. Trejo  Family/Caregiver Present:  No  Co-Treatment: OT  Co-Treatment Reason: Safe Mobility  Prior to Session Communication: Bedside nurse  Patient Position Received: Bed, 4 rail up, Alarm on  Preferred Learning Style: verbal, visual  General Comment: 84 year old female admits from LTC facility with AMS, CKD, Lymphedema, HLD, CHF, and UTI.  Home Living:  Home Living  Type of Home: Long Term Care facility  Prior Level of Function:  Prior Function Per Pt/Caregiver Report  Level of [place]: Needs assistance with ADLs, Needs assistance with homemaking, Needs assistance with functional transfers  Receives Help From:  (staff)  ADL Assistance: Needs assistance  Homemaking Assistance: Needs assistance  Ambulatory Assistance: Needs assistance  Prior Function Comments: Pt is a very poor Hx. Every time PLOF questions are asked, Pt gives a different answer. Initially states she is assist x1 for pivot transfers to WC, then assist x2 later on? Not oriented to date and unsure the last time she stood to get to her WC. (Per chart review, seems to be Max A x2 for bed mobility at baseline. Likely derrek lift?)  Precautions:  Precautions  Medical Precautions: Fall precautions     Vital Signs (Past 2hrs)        Date/Time Vitals Session Patient Position Pulse Resp SpO2 BP MAP (mmHg)    12/31/24 1300 --  --  --  --  98 %  --  --                         Objective   Pain:  Pain Assessment  Pain Assessment: 0-10  0-10 (Numeric) Pain Score: 0 - No pain  Cognition:  Cognition  Overall Cognitive Status: Impaired  Orientation Level: Disoriented to place, Disoriented to time, Disoriented to situation  Cognition Comments: Tells me she doesnt know where she is but she was downstairs applying for a job and now she is up here. Tells me its March  Memory: Exceptions to WFL  Long-Term Memory: Impaired  Short-Term Memory: Impaired  Insight: Severe  Processing Speed: Delayed    General Assessments:  Activity Tolerance  Endurance: Decreased tolerance for upright  activites    Sensation  Light Touch: No apparent deficits    Strength  Strength Comments: Limited ability to complete MMT due to cognition. After multiple attempts, Pt eventually able to demonstrate at least 2- B knee extension. Unable to participate with attempts at B hip flexion  Functional Assessments:  Bed Mobility  Bed Mobility: Yes  Bed Mobility 1  Bed Mobility 1: Supine to sitting  Level of Assistance 1: Dependent, +2  Bed Mobility Comments 1: Does not attempt to participate despite verbal understanding of attempt to sit to EOB (Very fearful near EOB.)  Bed Mobility 2  Bed Mobility  2: Sitting to supine  Level of Assistance 2: Dependent, +2  Bed Mobility 3  Bed Mobility 3: Scooting  Level of Assistance 3: Dependent, +2  Bed Mobility Comments 3: for repositioning in bed with green draw sheet    Transfers  Transfer: No    Outcome Measures:  [facility] Basic Mobility  Turning from your back to your side while in a flat bed without using bedrails: Total  Moving from lying on your back to sitting on the side of a flat bed without using bedrails: Total  Moving to and from bed to chair (including a wheelchair): Total  Standing up from a chair using your arms (e.g. wheelchair or bedside chair): Total  To walk in hospital room: Total  Climbing 3-5 steps with railing: Total  Basic Mobility - Total Score: 6

## 2024-12-31 NOTE — PROGRESS NOTES
Occupational Therapy    Evaluation    Patient Name: Morena Joshi  MRN: [id number]  Department: [address]  Room: [address] [place] [address]  Today's Date: 12/31/2024  Time Calculation  Start Time: 1318  Stop Time: 1330  Time Calculation (min): 12 min    Assessment  IP OT Assessment  OT Assessment: Pt is 85 y/o female admitted from LT for AMS. Pt presents as dependent for mobility, ? [place] lift for transfers and is total assist for ADL's. Pt is at baseline , will discharge OT services . No acute OT needs.  Prognosis: Poor  Evaluation/Treatment Tolerance: Patient tolerated treatment well  End of Session Communication: Bedside nurse  End of Session Patient Position: Bed, 3 rail up, Alarm on  Plan:  No Skilled OT: No acute OT goals identified  OT Frequency: OT eval only  OT Discharge Recommendations: No further acute OT  Equipment Recommended upon Discharge: Lift  OT Recommended Transfer Status: Dependent  OT - OK to Discharge: Yes    Subjective   Current Problem:  1. Altered mental status, unspecified altered mental status type        2. Urinary tract infection without hematuria, site unspecified          General:  General  Reason for Referral: decreased ADL's d/t AMS  Referred By: Dr. Trejo  Family/Caregiver Present: No  Co-Treatment: PT  Co-Treatment Reason: For safety in mobility and pt tolerance  Prior to Session Communication: Bedside nurse  Patient Position Received: Bed, 4 rail up, Alarm on  Preferred Learning Style: verbal, visual  General Comment: Cleared to see for eval, pt agreeable to therapy  Precautions:  Medical Precautions: Fall precautions    Vital Sign (Past 2hrs)        Date/Time Vitals Session Patient Position Pulse Resp SpO2 BP MAP (mmHg)    12/31/24 1300 --  --  --  --  98 %  --  --                        Pain:  Pain Assessment  Pain Assessment: 0-10  0-10 (Numeric) Pain Score: 0 - No pain    Objective   Cognition:  Overall Cognitive Status: Impaired  Orientation Level: Disoriented to place, Disoriented to time,  Disoriented to situation  Memory: Exceptions to WFL  Long-Term Memory: Impaired  Short-Term Memory: Impaired  Insight: Severe  Processing Speed: Delayed           Home Living:  Type of Home: Long Term Care facility  Lives With: Other (Comment) (care staff)   Prior Function:  Level of [place]: Needs assistance with ADLs, Needs assistance with homemaking, Needs assistance with functional transfers  Receives Help From:  (care staff)  ADL Assistance: Needs assistance  Homemaking Assistance: Needs assistance  Ambulatory Assistance: Needs assistance  Hand Dominance: Right  Prior Function Comments: Pt is very poor historian. Pt gives different anwser to same question. Per chart pt is max. assist x2 for bed mobility.  IADL History:  IADL Comments: Per facility  ADL:  Eating Assistance: Minimal  Eating Deficit: Setup, Supervision/safety, Bringing food to mouth assist (per clinica;l judgement)  Grooming Assistance: Maximal  Grooming Deficit: Setup, Supervision/safety (per clinial judgement)  Bathing Assistance: Total  Bathing Deficit:  (per clinical judgement)  UE Dressing Assistance: Total  UE Dressing Deficit:  (per clinical judgement)  LE Dressing Assistance: Total  LE Dressing Deficit:  (per clinical judgement)  Toileting Assistance with Device: Total  Toileting Deficit:  (per clinical judgement)  Activity Tolerance:  Endurance: Decreased tolerance for upright activites  Bed Mobility/Transfers: Bed Mobility  Bed Mobility: Yes  Bed Mobility 1  Bed Mobility 1: Supine to sitting  Level of Assistance 1: Dependent, +2  Bed Mobility Comments 1: Pt fearful of falling, pt w/ little effort to particiapate.  Bed Mobility 2  Bed Mobility  2: Sitting to supine  Level of Assistance 2: Dependent, +2  Bed Mobility Comments 2: Assist for trunk down, legs in  Bed Mobility 3  Bed Mobility 3: Scooting  Level of Assistance 3: Dependent, +2  Bed Mobility Comments 3: for scooting to HOB w/ green sheet.  Modalities:     IADL's:   IADL  Comments: Per facility  Vision: Vision - Basic Assessment  Current Vision: Wears glasses all the time  Sensation:  Light Touch: No apparent deficits  Strength:  Strength Comments: BUE's =/> 3/5 per functional observ., limted ability to follow commands  Perception:     Coordination:      Hand Function:  Hand Function  Gross Grasp: Functional  Coordination: Functional  Extremities: RUE   RUE : Within Functional Limits and LUE   LUE: Within Functional Limits      Outcome Measures: [facility] Daily Activity  Putting on and taking off regular lower body clothing: Total  Bathing (including washing, rinsing, drying): Total  Putting on and taking off regular upper body clothing: Total  Toileting, which includes using toilet, bedpan or urinal: Total  Taking care of personal grooming such as brushing teeth: A lot  Eating Meals: A little  Daily Activity - Total Score: 9      Education Documentation  No documentation found.  Education Comments  No comments found.      Goals:

## 2024-12-31 NOTE — PROGRESS NOTES
Morena Joshi is a 84 y.o. female on day 2 of admission presenting with Altered mental status, unspecified altered mental status type.      Subjective   No complaints        Objective     Last Recorded Vitals  /78 (BP Location: Left arm, Patient Position: Lying)   Pulse 89   Temp 36.4 °C (97.5 °F) (Temporal)   Resp 18   Wt 86.2 kg (190 lb 0.6 oz)   SpO2 98%   Intake/Output last 3 Shifts:    Intake/Output Summary (Last 24 hours) at 12/31/2024 1423  Last data filed at 12/31/2024 1331  Gross per 24 hour   Intake 300 ml   Output --   Net 300 ml       Admission Weight  Weight: 86.2 kg (190 lb) (12/29/24 1644)    Daily Weight  12/30/24 : 86.2 kg (190 lb 0.6 oz)    Image Results  ECG 12 lead  Atrial fibrillation with premature ventricular or aberrantly conducted complexes  ST & T wave abnormality, consider inferior ischemia  Prolonged QT  Abnormal ECG    Confirmed by Simon Mensah ([id number]) on 12/30/2024 3:32:04 PM      Physical Exam  Cardiovascular:      Rate and Rhythm: Normal rate.   Pulmonary:      Effort: No respiratory distress.      Breath sounds: Normal breath sounds.   Abdominal:      Palpations: Abdomen is soft.   Neurological:      Mental Status: She is alert.         Relevant Results               Assessment/Plan                  Assessment & Plan  Altered mental status, unspecified altered mental status type  CT unremarkable   +UA , leukocytosis - ATB  Stage 3a chronic kidney disease (Multi)    Monitor   Lymphedema  Chronic   Hyperlipidemia  Cont statin  Congestive heart failure  Cont home meds  Chronic O2 pta   UTI (urinary tract infection)  Cont atb     Dc back LTC                Rebecca Duarte, APRN-CNP

## 2025-01-14 NOTE — DOCUMENTATION CLARIFICATION NOTE
"    PATIENT:               JR CALDERÓN  ACCT #:                  [id number]  MRN:                       [id number]  :                       1940  ADMIT DATE:       2024 4:36 PM  DISCH DATE:        2024 7:28 PM  RESPONDING PROVIDER #:        [id number]          PROVIDER RESPONSE TEXT:    Metabolic encephalopathy 2/2 UTI    CDI QUERY TEXT:    Clarification        Instruction:    Based on your assessment of the patient and the clinical information, please provide the requested documentation by clicking on the appropriate radio button and enter any additional information if prompted.    Question: Please further clarify the most likely etiology of the altered mental status as    When answering this query, please exercise your independent professional judgment. The fact that a question is being asked, does not imply that any particular answer is desired or expected.    The patient's clinical indicators include:  Clinical Information:  \"84 y.o. female who presents with confusion from NH. No complaints during exam. Pleasantly confused\" : HP:     Clinical Indicators:    Labs: wbc: 13.2, 14.0, 14.8, anc 9.22, UA: pos for leukocyte esterase, bacteria, turbid color    : CT Head: \"CT head wo IV contrast Final Result: No acute intracranial abnormality. Moderate-severe supratentorial chronic small vessel ischemic disease and diffuse supratentorial and infratentorial volume loss.\"    : ED: \"Patient is an 84-year-old female that presents emergency department for evaluation of altered mental status.  Patient currently at nursing facility and yesterday was found by staff to be more confused.  They were concerned for possible urinary tract infection at that time and patient did reportedly receive an injection of IM antibiotics however today seem to be more confused and drowsy.  She was sent in for further evaluation.\"    Treatment: CT head, IV atb: Rocephin 1 gm iv daily,,    Risk Factors: ECF resident, " UTI, Hx altered mental status  Options provided:  -- Metabolic encephalopathy 2/2 UTI  -- Other - I will add my own diagnosis  -- Refer to Clinical Documentation Reviewer    Query created by: Ritchie Liu on 12/31/2024 9:08 AM      Electronically signed by:  JOSUE LEE 1/14/2025 4:35 PM

## 2025-01-14 NOTE — DISCHARGE SUMMARY
Discharge Diagnosis  Altered mental status, unspecified altered mental status type    Issues Requiring Follow-Up  Follow pcp     Discharge Meds     Medication List      CONTINUE taking these medications     acetaminophen 325 mg tablet; Commonly known as: Tylenol   aspirin 81 mg EC tablet; Take 1 tablet (81 mg) by mouth once daily.   atorvastatin 40 mg tablet; Commonly known as: Lipitor; Take 1 tablet (40   mg) by mouth once daily at bedtime.   bisacodyl 10 mg suppository; Commonly known as: Dulcolax; Insert 1   suppository (10 mg) into the rectum once daily as needed for constipation.   carvedilol 6.25 mg tablet; Commonly known as: Coreg   docusate sodium 100 mg capsule; Commonly known as: Colace   ergocalciferol 1.25 MG (13537 UT) capsule; Commonly known as: Vitamin   D-2   hydrOXYzine HCL 10 mg tablet; Commonly known as: Atarax   ipratropium-albuteroL 0.5-2.5 mg/3 mL nebulizer solution; Commonly known   as: Duo-Neb   isosorbide mononitrate ER 30 mg 24 hr tablet; Commonly known as: Imdur   levothyroxine 100 mcg tablet; Commonly known as: Synthroid, Levoxyl   melatonin 10 mg tablet   omeprazole OTC 20 mg EC tablet; Commonly known as: PriLOSEC OTC   oxygen gas therapy; Commonly known as: O2; Inhale 6 L/min every 12   hours.   polyethylene glycol 17 gram packet; Commonly known as: Glycolax, Miralax     STOP taking these medications     hydrALAZINE 20 mg/mL injection; Commonly known as: Apresoline   nystatin 100,000 unit/gram powder; Commonly known as: Mycostatin   ondansetron 4 mg tablet; Commonly known as: Zofran   polymyxin B sulf-trimethoprim ophthalmic solution; Commonly known as:   Polytrim     ASK your doctor about these medications     cephalexin 500 mg capsule; Commonly known as: Keflex; Take 1 capsule   (500 mg) by mouth every 8 hours for 15 doses.; Ask about: Should I take   this medication?       Test Results Pending At Discharge  Pending Labs       Order Current Status    Extra Urine Gray Tube Collected  (12/29/24 3982)    Urinalysis with Reflex Culture and Microscopic In process            Hospital Course   Altered mental status, unspecified altered mental status type  CT unremarkable   +UA , leukocytosis - ATB  Stage 3a chronic kidney disease (Multi)     Monitor   Lymphedema  Chronic   Hyperlipidemia  Cont statin  Congestive heart failure  Cont home meds  Chronic O2 pta   UTI (urinary tract infection)  Cont atb      Dc back LTC    Pertinent Physical Exam At Time of Discharge  Physical Exam    Outpatient Follow-Up  No future appointments.      Rebecca Duarte, APRN-CNP